# Patient Record
Sex: MALE | Race: WHITE | Employment: FULL TIME | URBAN - METROPOLITAN AREA
[De-identification: names, ages, dates, MRNs, and addresses within clinical notes are randomized per-mention and may not be internally consistent; named-entity substitution may affect disease eponyms.]

---

## 2017-07-04 ENCOUNTER — HOSPITAL ENCOUNTER (EMERGENCY)
Age: 27
Discharge: HOME | End: 2017-07-04
Attending: EMERGENCY MEDICINE
Payer: MEDICARE

## 2017-07-04 ENCOUNTER — APPOINTMENT (OUTPATIENT)
Dept: CT IMAGING | Age: 27
End: 2017-07-04
Payer: MEDICARE

## 2017-07-04 VITALS
SYSTOLIC BLOOD PRESSURE: 125 MMHG | TEMPERATURE: 97.6 F | WEIGHT: 315 LBS | BODY MASS INDEX: 38.36 KG/M2 | RESPIRATION RATE: 18 BRPM | DIASTOLIC BLOOD PRESSURE: 70 MMHG | HEART RATE: 85 BPM | OXYGEN SATURATION: 98 % | HEIGHT: 76 IN

## 2017-07-04 DIAGNOSIS — S00.83XA CONTUSION OF FACE, INITIAL ENCOUNTER: Primary | ICD-10-CM

## 2017-07-04 PROCEDURE — 99284 EMERGENCY DEPT VISIT MOD MDM: CPT

## 2017-07-04 PROCEDURE — 70486 CT MAXILLOFACIAL W/O DYE: CPT

## 2017-07-04 ASSESSMENT — PAIN DESCRIPTION - PAIN TYPE: TYPE: ACUTE PAIN

## 2017-07-04 ASSESSMENT — VISUAL ACUITY
OU: 20/20
OS: 20/25
OD: 20/20

## 2017-07-04 ASSESSMENT — PAIN SCALES - GENERAL: PAINLEVEL_OUTOF10: 8

## 2017-07-04 ASSESSMENT — PAIN DESCRIPTION - ORIENTATION: ORIENTATION: LEFT;LOWER;UPPER

## 2017-07-04 ASSESSMENT — PAIN DESCRIPTION - LOCATION: LOCATION: EYE

## 2017-07-17 ENCOUNTER — HOSPITAL ENCOUNTER (EMERGENCY)
Age: 27
Discharge: HOME | End: 2017-07-17
Attending: EMERGENCY MEDICINE
Payer: MEDICARE

## 2017-07-17 VITALS
SYSTOLIC BLOOD PRESSURE: 153 MMHG | DIASTOLIC BLOOD PRESSURE: 87 MMHG | HEART RATE: 94 BPM | TEMPERATURE: 98.1 F | OXYGEN SATURATION: 100 % | RESPIRATION RATE: 18 BRPM

## 2017-07-17 DIAGNOSIS — B07.0 PLANTAR WART OF RIGHT FOOT: Primary | ICD-10-CM

## 2017-07-17 PROCEDURE — 99282 EMERGENCY DEPT VISIT SF MDM: CPT

## 2017-07-17 ASSESSMENT — ENCOUNTER SYMPTOMS
BACK PAIN: 0
ABDOMINAL PAIN: 0
DIARRHEA: 0
NAUSEA: 0
VOMITING: 0
COLOR CHANGE: 0
SHORTNESS OF BREATH: 0
COUGH: 0

## 2017-07-17 ASSESSMENT — PAIN DESCRIPTION - LOCATION: LOCATION: FOOT

## 2017-07-17 ASSESSMENT — PAIN DESCRIPTION - PROGRESSION: CLINICAL_PROGRESSION: GRADUALLY WORSENING

## 2017-07-17 ASSESSMENT — PAIN DESCRIPTION - DESCRIPTORS: DESCRIPTORS: ACHING

## 2017-07-17 ASSESSMENT — PAIN DESCRIPTION - ONSET: ONSET: ON-GOING

## 2017-07-17 ASSESSMENT — PAIN DESCRIPTION - ORIENTATION: ORIENTATION: LEFT

## 2017-07-17 ASSESSMENT — PAIN DESCRIPTION - PAIN TYPE: TYPE: ACUTE PAIN

## 2017-07-17 ASSESSMENT — PAIN SCALES - GENERAL: PAINLEVEL_OUTOF10: 10

## 2017-07-24 ENCOUNTER — HOSPITAL ENCOUNTER (EMERGENCY)
Age: 27
Discharge: HOME | End: 2017-07-24
Attending: EMERGENCY MEDICINE
Payer: MEDICARE

## 2017-07-24 ENCOUNTER — APPOINTMENT (OUTPATIENT)
Dept: CT IMAGING | Age: 27
End: 2017-07-24
Payer: MEDICARE

## 2017-07-24 VITALS
DIASTOLIC BLOOD PRESSURE: 65 MMHG | BODY MASS INDEX: 38.36 KG/M2 | WEIGHT: 315 LBS | RESPIRATION RATE: 18 BRPM | HEIGHT: 76 IN | OXYGEN SATURATION: 100 % | SYSTOLIC BLOOD PRESSURE: 155 MMHG | TEMPERATURE: 98.3 F | HEART RATE: 93 BPM

## 2017-07-24 DIAGNOSIS — R51.9 NONINTRACTABLE HEADACHE, UNSPECIFIED CHRONICITY PATTERN, UNSPECIFIED HEADACHE TYPE: Primary | ICD-10-CM

## 2017-07-24 PROCEDURE — 99284 EMERGENCY DEPT VISIT MOD MDM: CPT

## 2017-07-24 PROCEDURE — 96374 THER/PROPH/DIAG INJ IV PUSH: CPT

## 2017-07-24 PROCEDURE — 70450 CT HEAD/BRAIN W/O DYE: CPT

## 2017-07-24 PROCEDURE — 6360000002 HC RX W HCPCS: Performed by: PHYSICIAN ASSISTANT

## 2017-07-24 PROCEDURE — 2580000003 HC RX 258: Performed by: PHYSICIAN ASSISTANT

## 2017-07-24 PROCEDURE — 96376 TX/PRO/DX INJ SAME DRUG ADON: CPT

## 2017-07-24 PROCEDURE — 96375 TX/PRO/DX INJ NEW DRUG ADDON: CPT

## 2017-07-24 RX ORDER — DIPHENHYDRAMINE HYDROCHLORIDE 50 MG/ML
25 INJECTION INTRAMUSCULAR; INTRAVENOUS ONCE
Status: COMPLETED | OUTPATIENT
Start: 2017-07-24 | End: 2017-07-24

## 2017-07-24 RX ORDER — HYDROCODONE BITARTRATE AND ACETAMINOPHEN 5; 325 MG/1; MG/1
1 TABLET ORAL 3 TIMES DAILY
Qty: 12 TABLET | Refills: 0 | Status: SHIPPED | OUTPATIENT
Start: 2017-07-24 | End: 2017-07-28

## 2017-07-24 RX ORDER — ONDANSETRON 4 MG/1
4 TABLET, ORALLY DISINTEGRATING ORAL EVERY 8 HOURS PRN
Qty: 20 TABLET | Refills: 0 | Status: SHIPPED | OUTPATIENT
Start: 2017-07-24 | End: 2020-12-28

## 2017-07-24 RX ORDER — ONDANSETRON 2 MG/ML
4 INJECTION INTRAMUSCULAR; INTRAVENOUS ONCE
Status: COMPLETED | OUTPATIENT
Start: 2017-07-24 | End: 2017-07-24

## 2017-07-24 RX ORDER — BUTALBITAL, ACETAMINOPHEN AND CAFFEINE 50; 325; 40 MG/1; MG/1; MG/1
1 TABLET ORAL EVERY 6 HOURS PRN
Qty: 20 TABLET | Refills: 0 | Status: SHIPPED | OUTPATIENT
Start: 2017-07-24 | End: 2020-12-28

## 2017-07-24 RX ORDER — 0.9 % SODIUM CHLORIDE 0.9 %
1000 INTRAVENOUS SOLUTION INTRAVENOUS ONCE
Status: COMPLETED | OUTPATIENT
Start: 2017-07-24 | End: 2017-07-24

## 2017-07-24 RX ORDER — PROMETHAZINE HYDROCHLORIDE 25 MG/ML
12.5 INJECTION, SOLUTION INTRAMUSCULAR; INTRAVENOUS ONCE
Status: COMPLETED | OUTPATIENT
Start: 2017-07-24 | End: 2017-07-24

## 2017-07-24 RX ADMIN — PROMETHAZINE HYDROCHLORIDE 12.5 MG: 25 INJECTION INTRAMUSCULAR; INTRAVENOUS at 20:44

## 2017-07-24 RX ADMIN — HYDROMORPHONE HYDROCHLORIDE 1 MG: 1 INJECTION, SOLUTION INTRAMUSCULAR; INTRAVENOUS; SUBCUTANEOUS at 20:45

## 2017-07-24 RX ADMIN — SODIUM CHLORIDE 1000 ML: 9 INJECTION, SOLUTION INTRAVENOUS at 20:44

## 2017-07-24 RX ADMIN — DIPHENHYDRAMINE HYDROCHLORIDE 25 MG: 50 INJECTION, SOLUTION INTRAMUSCULAR; INTRAVENOUS at 20:44

## 2017-07-24 RX ADMIN — ONDANSETRON 4 MG: 2 INJECTION INTRAMUSCULAR; INTRAVENOUS at 22:30

## 2017-07-24 RX ADMIN — HYDROMORPHONE HYDROCHLORIDE 1 MG: 1 INJECTION, SOLUTION INTRAMUSCULAR; INTRAVENOUS; SUBCUTANEOUS at 21:53

## 2017-07-24 ASSESSMENT — PAIN SCALES - GENERAL
PAINLEVEL_OUTOF10: 10
PAINLEVEL_OUTOF10: 9
PAINLEVEL_OUTOF10: 5
PAINLEVEL_OUTOF10: 10
PAINLEVEL_OUTOF10: 8

## 2017-07-24 ASSESSMENT — PAIN DESCRIPTION - LOCATION
LOCATION: HEAD
LOCATION: HEAD

## 2017-07-24 ASSESSMENT — PAIN DESCRIPTION - PAIN TYPE
TYPE: ACUTE PAIN

## 2017-08-16 ENCOUNTER — HOSPITAL ENCOUNTER (EMERGENCY)
Age: 27
Discharge: HOME | End: 2017-08-16
Attending: EMERGENCY MEDICINE
Payer: MEDICARE

## 2017-08-16 VITALS
DIASTOLIC BLOOD PRESSURE: 79 MMHG | HEART RATE: 87 BPM | HEIGHT: 76 IN | RESPIRATION RATE: 15 BRPM | SYSTOLIC BLOOD PRESSURE: 146 MMHG | BODY MASS INDEX: 38.36 KG/M2 | OXYGEN SATURATION: 100 % | TEMPERATURE: 98.5 F | WEIGHT: 315 LBS

## 2017-08-16 DIAGNOSIS — T78.40XA ALLERGIC REACTION CAUSED BY A DRUG, INITIAL ENCOUNTER: ICD-10-CM

## 2017-08-16 DIAGNOSIS — J40 BRONCHITIS: Primary | ICD-10-CM

## 2017-08-16 PROCEDURE — 99283 EMERGENCY DEPT VISIT LOW MDM: CPT

## 2017-08-16 RX ORDER — METHYLPREDNISOLONE 4 MG/1
TABLET ORAL
Qty: 1 KIT | Refills: 0 | Status: SHIPPED | OUTPATIENT
Start: 2017-08-16 | End: 2017-08-22

## 2017-08-16 RX ORDER — AZITHROMYCIN 250 MG/1
TABLET, FILM COATED ORAL
Qty: 1 PACKET | Refills: 0 | Status: SHIPPED | OUTPATIENT
Start: 2017-08-16 | End: 2017-08-23 | Stop reason: ALTCHOICE

## 2017-08-16 RX ORDER — HYDROCODONE BITARTRATE AND ACETAMINOPHEN 5; 325 MG/1; MG/1
1 TABLET ORAL EVERY 6 HOURS PRN
Qty: 10 TABLET | Refills: 0 | Status: SHIPPED | OUTPATIENT
Start: 2017-08-16 | End: 2017-08-23

## 2017-08-16 ASSESSMENT — PAIN SCALES - GENERAL: PAINLEVEL_OUTOF10: 8

## 2017-08-16 ASSESSMENT — PAIN DESCRIPTION - FREQUENCY: FREQUENCY: CONTINUOUS

## 2017-08-16 ASSESSMENT — PAIN DESCRIPTION - DESCRIPTORS: DESCRIPTORS: BURNING;CONSTANT

## 2017-08-16 ASSESSMENT — PAIN DESCRIPTION - LOCATION: LOCATION: THROAT;CHEST

## 2017-08-23 ENCOUNTER — HOSPITAL ENCOUNTER (EMERGENCY)
Age: 27
Discharge: HOME | End: 2017-08-23
Attending: EMERGENCY MEDICINE
Payer: MEDICARE

## 2017-08-23 VITALS
DIASTOLIC BLOOD PRESSURE: 60 MMHG | HEART RATE: 81 BPM | SYSTOLIC BLOOD PRESSURE: 138 MMHG | TEMPERATURE: 97.9 F | BODY MASS INDEX: 38.36 KG/M2 | RESPIRATION RATE: 16 BRPM | OXYGEN SATURATION: 97 % | HEIGHT: 76 IN | WEIGHT: 315 LBS

## 2017-08-23 DIAGNOSIS — J06.9 VIRAL URI: Primary | ICD-10-CM

## 2017-08-23 PROCEDURE — 99283 EMERGENCY DEPT VISIT LOW MDM: CPT

## 2017-08-23 RX ORDER — FLUTICASONE PROPIONATE 50 MCG
1 SPRAY, SUSPENSION (ML) NASAL DAILY
Qty: 1 BOTTLE | Refills: 0 | Status: SHIPPED | OUTPATIENT
Start: 2017-08-23 | End: 2018-02-09

## 2017-08-23 ASSESSMENT — ENCOUNTER SYMPTOMS
VOICE CHANGE: 0
PHOTOPHOBIA: 0
COUGH: 0
TROUBLE SWALLOWING: 0
EYE PAIN: 0
RHINORRHEA: 1
SHORTNESS OF BREATH: 0
SORE THROAT: 0
SINUS PRESSURE: 1

## 2017-08-23 ASSESSMENT — PAIN SCALES - GENERAL: PAINLEVEL_OUTOF10: 8

## 2017-08-23 ASSESSMENT — PAIN DESCRIPTION - PAIN TYPE: TYPE: ACUTE PAIN

## 2017-08-23 ASSESSMENT — PAIN DESCRIPTION - LOCATION: LOCATION: HEAD

## 2017-08-23 ASSESSMENT — PAIN DESCRIPTION - DESCRIPTORS: DESCRIPTORS: PRESSURE

## 2017-10-17 ENCOUNTER — HOSPITAL ENCOUNTER (EMERGENCY)
Age: 27
Discharge: HOME | End: 2017-10-17
Attending: EMERGENCY MEDICINE
Payer: MEDICARE

## 2017-10-17 VITALS
OXYGEN SATURATION: 100 % | TEMPERATURE: 97.4 F | SYSTOLIC BLOOD PRESSURE: 153 MMHG | BODY MASS INDEX: 43.16 KG/M2 | DIASTOLIC BLOOD PRESSURE: 105 MMHG | WEIGHT: 315 LBS | RESPIRATION RATE: 19 BRPM | HEART RATE: 114 BPM

## 2017-10-17 DIAGNOSIS — J02.9 ACUTE PHARYNGITIS, UNSPECIFIED ETIOLOGY: Primary | ICD-10-CM

## 2017-10-17 DIAGNOSIS — J40 BRONCHITIS: ICD-10-CM

## 2017-10-17 PROCEDURE — 6370000000 HC RX 637 (ALT 250 FOR IP): Performed by: EMERGENCY MEDICINE

## 2017-10-17 PROCEDURE — 96372 THER/PROPH/DIAG INJ SC/IM: CPT

## 2017-10-17 PROCEDURE — 99282 EMERGENCY DEPT VISIT SF MDM: CPT

## 2017-10-17 PROCEDURE — 6360000002 HC RX W HCPCS: Performed by: EMERGENCY MEDICINE

## 2017-10-17 RX ORDER — AZITHROMYCIN 250 MG/1
500 TABLET, FILM COATED ORAL ONCE
Status: COMPLETED | OUTPATIENT
Start: 2017-10-17 | End: 2017-10-17

## 2017-10-17 RX ORDER — DEXAMETHASONE SODIUM PHOSPHATE 10 MG/ML
10 INJECTION INTRAMUSCULAR; INTRAVENOUS ONCE
Status: COMPLETED | OUTPATIENT
Start: 2017-10-17 | End: 2017-10-17

## 2017-10-17 RX ORDER — AZITHROMYCIN 500 MG/1
500 TABLET, FILM COATED ORAL DAILY
Qty: 5 TABLET | Refills: 0 | Status: SHIPPED | OUTPATIENT
Start: 2017-10-17 | End: 2017-10-22

## 2017-10-17 RX ORDER — HYDROCODONE BITARTRATE AND ACETAMINOPHEN 5; 325 MG/1; MG/1
1-2 TABLET ORAL EVERY 6 HOURS PRN
Qty: 20 TABLET | Refills: 0 | Status: SHIPPED | OUTPATIENT
Start: 2017-10-17 | End: 2020-12-28

## 2017-10-17 RX ORDER — GUAIFENESIN AND DEXTROMETHORPHAN HYDROBROMIDE 1200; 60 MG/1; MG/1
1 TABLET, EXTENDED RELEASE ORAL 2 TIMES DAILY
Qty: 28 TABLET | Refills: 0 | Status: SHIPPED | OUTPATIENT
Start: 2017-10-17 | End: 2018-02-09

## 2017-10-17 RX ADMIN — AZITHROMYCIN 500 MG: 250 TABLET, FILM COATED ORAL at 03:13

## 2017-10-17 RX ADMIN — DEXAMETHASONE SODIUM PHOSPHATE 10 MG: 10 INJECTION INTRAMUSCULAR; INTRAVENOUS at 03:13

## 2017-10-17 ASSESSMENT — ENCOUNTER SYMPTOMS
GASTROINTESTINAL NEGATIVE: 1
SORE THROAT: 1
COUGH: 1

## 2017-10-17 ASSESSMENT — PAIN SCALES - GENERAL: PAINLEVEL_OUTOF10: 8

## 2017-10-17 ASSESSMENT — PAIN DESCRIPTION - PAIN TYPE: TYPE: ACUTE PAIN

## 2017-10-17 NOTE — ED PROVIDER NOTES
today. Amy Cantor MD)  Documentation: Possible medication side effects, risk of tolerance and/or dependence, and alternative treatments discussed.  Amy Cantor MD)  (Please note that portions of this note were completed with a voice recognition program.  Efforts were made to edit the dictations but occasionally words are mis-transcribed.)    Amy Cantor MD  Attending Emergency Physician         Amy Cantor MD  10/17/17 2555

## 2017-10-27 ENCOUNTER — HOSPITAL ENCOUNTER (EMERGENCY)
Age: 27
Discharge: HOME | End: 2017-10-27
Attending: EMERGENCY MEDICINE
Payer: MEDICARE

## 2017-10-27 VITALS
HEIGHT: 76 IN | TEMPERATURE: 97.9 F | WEIGHT: 315 LBS | HEART RATE: 97 BPM | BODY MASS INDEX: 38.36 KG/M2 | RESPIRATION RATE: 18 BRPM | OXYGEN SATURATION: 96 % | DIASTOLIC BLOOD PRESSURE: 86 MMHG | SYSTOLIC BLOOD PRESSURE: 142 MMHG

## 2017-10-27 DIAGNOSIS — J02.9 VIRAL PHARYNGITIS: Primary | ICD-10-CM

## 2017-10-27 PROCEDURE — 6360000002 HC RX W HCPCS: Performed by: EMERGENCY MEDICINE

## 2017-10-27 PROCEDURE — 6370000000 HC RX 637 (ALT 250 FOR IP): Performed by: EMERGENCY MEDICINE

## 2017-10-27 PROCEDURE — 99283 EMERGENCY DEPT VISIT LOW MDM: CPT

## 2017-10-27 RX ORDER — PSEUDOEPHEDRINE HYDROCHLORIDE 30 MG/1
60 TABLET ORAL EVERY 4 HOURS PRN
Status: DISCONTINUED | OUTPATIENT
Start: 2017-10-27 | End: 2017-10-27 | Stop reason: HOSPADM

## 2017-10-27 RX ORDER — DEXAMETHASONE 4 MG/1
10 TABLET ORAL ONCE
Status: COMPLETED | OUTPATIENT
Start: 2017-10-27 | End: 2017-10-27

## 2017-10-27 RX ADMIN — PSEUDOEPHEDRINE HCL 60 MG: 30 TABLET, COATED ORAL at 06:24

## 2017-10-27 RX ADMIN — DEXAMETHASONE 10 MG: 4 TABLET ORAL at 06:24

## 2017-10-27 ASSESSMENT — PAIN SCALES - GENERAL: PAINLEVEL_OUTOF10: 8

## 2017-10-28 NOTE — ED PROVIDER NOTES
[address] ED  eMERGENCY dEPARTMENT eNCOUnter    Pt Name: Jerilyn Gastelum  MRN: [id number]  [place] 1990  Date of evaluation: 10/28/17  CHIEF COMPLAINT       Chief Complaint   Patient presents with    Otalgia    Pharyngitis    Neck Pain     HISTORY OF PRESENT ILLNESS   HPI   56-year-old male past medical history as listed below presents the ER with a sore throat. Patient states symptoms ongoing for approximately 2 weeks despite treatment with antibiotic have persisted. Patient states the pain radiates to bilateral ears. Patient denies fevers, chills, nausea, vomiting. Patient states he has intermittent neck discomfort. It is made worse with movement. REVIEW OF SYSTEMS     Review of Systems   All other systems reviewed and are negative.     PAST MEDICAL HISTORY     Past Medical History:   Diagnosis Date    Anxiety     Asthma     Bipolar disorder ([address]     Depression     GERD (gastroesophageal reflux disease)     Headache     Hypertension     Hypothyroidism     Insomnia     Unspecified sleep apnea      SURGICAL HISTORY       Past Surgical History:   Procedure Laterality Date    FINGER TRIGGER RELEASE      UPPER GASTROINTESTINAL ENDOSCOPY      WISDOM TOOTH EXTRACTION       CURRENT MEDICATIONS       Discharge Medication List as of 10/27/2017  6:05 AM      CONTINUE these medications which have NOT CHANGED    Details   omeprazole (PRILOSEC) 20 MG capsule Take 40 mg by mouth daily Historical Med      carvedilol (COREG) 6.25 MG tablet Take 6.25 mg by mouth 2 times daily (with meals)      fexofenadine (ALLEGRA ALLERGY) 180 MG tablet Take 180 mg by mouth daily      ALPRAZolam (XANAX) 0.5 MG tablet Take 1 mg by mouth nightly as needed for Sleep       amLODIPine (NORVASC) 5 MG tablet   Take 10 mg by mouth daily       Multiple Vitamins-Minerals (CENTROVITE) TABS Historical Med      FLUoxetine (PROZAC) 40 MG capsule Total of 60 mg daily, Disp-30 capsule, R-11      VENTOLIN  (90 BASE) MCG/ACT inhaler Inhale 2 puffs into the lungs every 4 hours as needed for Wheezing or Shortness of Breath (none today). levothyroxine (SYNTHROID) 50 MCG tablet   75 mcg daily       Dextromethorphan-Guaifenesin (MUCINEX DM MAXIMUM STRENGTH)  MG TB12 Take 1 tablet by mouth 2 times daily, Disp-28 tablet, R-0Print      HYDROcodone-acetaminophen (NORCO) 5-325 MG per tablet Take 1-2 tablets by mouth every 6 hours as needed (as needed for cough, discomfort, fever) . , Disp-20 tablet, R-0Print      fluticasone (FLONASE) 50 MCG/ACT nasal spray 1 spray by Nasal route daily, Disp-1 Bottle, R-0Print      butalbital-acetaminophen-caffeine (FIORICET) -40 MG per tablet Take 1 tablet by mouth every 6 hours as needed for Headaches, Disp-20 tablet, R-0Print      ondansetron (ZOFRAN ODT) 4 MG disintegrating tablet Take 1 tablet by mouth every 8 hours as needed for Nausea, Disp-20 tablet, R-0Print           ALLERGIES     is allergic to chantix [varenicline tartrate]; ciprofloxacin; nsaids; pcn [penicillins]; and tessalon [benzonatate]. FAMILY HISTORY     is adopted. SOCIAL HISTORY      reports that he has been smoking Cigarettes. He has a 5.00 pack-year smoking history. He has never used smokeless tobacco. He reports that he does not drink alcohol or use drugs. PHYSICAL EXAM     INITIAL VITALS: BP (!) 142/86   Pulse 97   Temp 97.9 °F (36.6 °C) (Oral)   Resp 18   Ht 6' 4\" (1.93 m)   Wt (!) 340 lb (154.2 kg)   SpO2 96%   BMI 41.39 kg/m²    Physical Exam   Constitutional: He is oriented to person, place, and time. He appears well-developed and well-nourished. No distress. HENT:   Head: Normocephalic and atraumatic. Right Ear: External ear normal.   Left Ear: External ear normal.   Nose: Nose normal.   Mouth/Throat: Oropharynx is clear and moist. No oropharyngeal exudate. Scant oropharyngeal erythema. Eyes: Conjunctivae and EOM are normal. Pupils are equal, round, and reactive to light.    Neck: Normal range of motion. Neck supple. Cardiovascular: Normal rate, regular rhythm, normal heart sounds and intact distal pulses. Exam reveals no gallop and no friction rub. No murmur heard. Pulmonary/Chest: Effort normal and breath sounds normal. No stridor. No respiratory distress. He has no wheezes. He has no rales. He exhibits no tenderness. Abdominal: Soft. Bowel sounds are normal. He exhibits no distension. There is no tenderness. There is no rebound and no guarding. Musculoskeletal: Normal range of motion. He exhibits no edema, tenderness or deformity. Neurological: He is alert and oriented to person, place, and time. No cranial nerve deficit. Skin: Skin is warm and dry. No rash noted. He is not diaphoretic. No erythema. Psychiatric: He has a normal mood and affect. His behavior is normal. Judgment and thought content normal.   Nursing note and vitals reviewed. MEDICAL DECISION MAKING:   MDM    Provide patient steroid and arrange close follow-up with primary care physician. Procedures    DIAGNOSTIC RESULTS   EKG: All EKG's are interpreted by the Emergency Department Physician who either signs or Co-signs this chart in the absence of a cardiologist.      RADIOLOGY:All plain film, CT, MRI, and formal ultrasound images (except ED bedside ultrasound) are read by the radiologist and interpretations are viewed by the emergency physician. No orders to display     LABS: All lab results were reviewed by myself, and all abnormals are listed below.   Labs Reviewed - No data to display  EMERGENCY DEPARTMENT COURSE:     Vitals:    Vitals:    10/27/17 0530   BP: (!) 142/86   Pulse: 97   Resp: 18   Temp: 97.9 °F (36.6 °C)   TempSrc: Oral   SpO2: 96%   Weight: (!) 340 lb (154.2 kg)   Height: 6' 4\" (1.93 m)     The patient was given the following medications while in the emergency department:  Orders Placed This Encounter   Medications    dexamethasone (DECADRON) tablet 10 mg    DISCONTD: pseudoephedrine (SUDAFED) tablet 60 mg     CONSULTS:  None    FINAL IMPRESSION      1. Viral pharyngitis          DISPOSITION/PLAN   DISPOSITION Decision to Discharge    PATIENT REFERRED TO:  No follow-up provider specified.   DISCHARGE MEDICATIONS:  Discharge Medication List as of 10/27/2017  6:05 AM        Ji Aguilera MD  Attending Emergency Physician                     Ji Aguilera MD  10/28/17 1971

## 2017-12-13 ENCOUNTER — APPOINTMENT (OUTPATIENT)
Dept: GENERAL RADIOLOGY | Age: 27
End: 2017-12-13
Payer: MEDICARE

## 2017-12-13 ENCOUNTER — HOSPITAL ENCOUNTER (EMERGENCY)
Age: 27
Discharge: HOME | End: 2017-12-13
Attending: EMERGENCY MEDICINE
Payer: MEDICARE

## 2017-12-13 VITALS
BODY MASS INDEX: 38.36 KG/M2 | WEIGHT: 315 LBS | OXYGEN SATURATION: 98 % | DIASTOLIC BLOOD PRESSURE: 80 MMHG | TEMPERATURE: 97.4 F | HEART RATE: 82 BPM | RESPIRATION RATE: 16 BRPM | SYSTOLIC BLOOD PRESSURE: 157 MMHG | HEIGHT: 76 IN

## 2017-12-13 DIAGNOSIS — R05.9 COUGH: ICD-10-CM

## 2017-12-13 DIAGNOSIS — J02.9 SORE THROAT: Primary | ICD-10-CM

## 2017-12-13 LAB
DIRECT EXAM: NORMAL
Lab: NORMAL
SPECIMEN DESCRIPTION: NORMAL
SPECIMEN DESCRIPTION: NORMAL
STATUS: NORMAL

## 2017-12-13 PROCEDURE — 99283 EMERGENCY DEPT VISIT LOW MDM: CPT

## 2017-12-13 PROCEDURE — 71020 XR CHEST STANDARD TWO VW: CPT

## 2017-12-13 PROCEDURE — 87880 STREP A ASSAY W/OPTIC: CPT

## 2017-12-13 PROCEDURE — 6370000000 HC RX 637 (ALT 250 FOR IP): Performed by: EMERGENCY MEDICINE

## 2017-12-13 RX ORDER — GUAIFENESIN AND CODEINE PHOSPHATE 100; 10 MG/5ML; MG/5ML
5 SOLUTION ORAL 3 TIMES DAILY PRN
Qty: 100 ML | Refills: 0 | Status: SHIPPED | OUTPATIENT
Start: 2017-12-13 | End: 2017-12-20

## 2017-12-13 RX ADMIN — LIDOCAINE HYDROCHLORIDE 15 ML: 20 SOLUTION ORAL; TOPICAL at 06:50

## 2017-12-13 ASSESSMENT — ENCOUNTER SYMPTOMS
BLOOD IN STOOL: 0
EYE REDNESS: 0
SINUS PRESSURE: 0
SHORTNESS OF BREATH: 0
DIARRHEA: 0
NAUSEA: 0
EYE PAIN: 0
COUGH: 1
COLOR CHANGE: 0
EYE DISCHARGE: 0
TROUBLE SWALLOWING: 0
CONSTIPATION: 0
WHEEZING: 0
CHEST TIGHTNESS: 0
RHINORRHEA: 0
FACIAL SWELLING: 0
ABDOMINAL PAIN: 0
SORE THROAT: 1
BACK PAIN: 0
VOMITING: 0

## 2017-12-13 ASSESSMENT — PAIN DESCRIPTION - PAIN TYPE: TYPE: ACUTE PAIN

## 2017-12-13 ASSESSMENT — PAIN SCALES - GENERAL: PAINLEVEL_OUTOF10: 10

## 2017-12-13 ASSESSMENT — PAIN DESCRIPTION - FREQUENCY: FREQUENCY: CONTINUOUS

## 2017-12-13 ASSESSMENT — PAIN DESCRIPTION - DESCRIPTORS: DESCRIPTORS: BURNING;CONSTANT;SHARP

## 2017-12-13 NOTE — ED NOTES
Pt states that he has been feeling sick for about 1 week. Pt states that his lungs and chest hurt from coughing and none of the OTC medications that he tried have not helped. Pt states that he went to his Doctor and received a Z-pack antibiotic but this did not help his symptoms. Pt A&Ox4, eupneic, GCS at 15, PWD, heart sounds strong and regular, lungs clear to auscultation, denies nausea.      Ian Soto RN  12/13/17 [id number]

## 2017-12-13 NOTE — ED PROVIDER NOTES
ONDANSETRON (ZOFRAN ODT) 4 MG DISINTEGRATING TABLET    Take 1 tablet by mouth every 8 hours as needed for Nausea    VENTOLIN  (90 BASE) MCG/ACT INHALER    Inhale 2 puffs into the lungs every 4 hours as needed for Wheezing or Shortness of Breath (none today). ALLERGIES     is allergic to chantix [varenicline tartrate]; ciprofloxacin; nsaids; pcn [penicillins]; and tessalon [benzonatate]. SOCIAL HISTORY      reports that he has been smoking Cigarettes. He has a 2.50 pack-year smoking history. He has never used smokeless tobacco. He reports that he does not drink alcohol or use drugs. PHYSICAL EXAM     INITIAL VITALS: BP (!) 157/80   Pulse 82   Temp 97.4 °F (36.3 °C) (Oral)   Resp 16   Ht 6' 4\" (1.93 m)   Wt (!) 349 lb (158.3 kg)   SpO2 98%   BMI 42.48 kg/m²      Physical Exam   Constitutional: He is oriented to person, place, and time. He appears well-developed and well-nourished. No distress. HENT:   Head: Normocephalic and atraumatic. Mouth/Throat: Oropharynx is clear and moist. No oropharyngeal exudate. Eyes: Conjunctivae and EOM are normal. Pupils are equal, round, and reactive to light. Right eye exhibits no discharge. Left eye exhibits no discharge. No scleral icterus. Neck: Normal range of motion. Neck supple. Cardiovascular: Normal rate, regular rhythm and normal heart sounds. Exam reveals no gallop and no friction rub. No murmur heard. Pulmonary/Chest: Effort normal and breath sounds normal. No respiratory distress. He has no wheezes. He has no rales. He exhibits tenderness. Abdominal: Soft. Bowel sounds are normal. He exhibits no distension and no mass. There is no tenderness. There is no rebound and no guarding. Musculoskeletal: Normal range of motion. He exhibits no edema or tenderness. Lymphadenopathy:     He has no cervical adenopathy. Neurological: He is alert and oriented to person, place, and time. He displays normal reflexes. No cranial nerve deficit. He exhibits normal muscle tone. Coordination normal.   Skin: Skin is warm and dry. No rash noted. He is not diaphoretic. No erythema. No pallor. Psychiatric: He has a normal mood and affect. His behavior is normal. Judgment and thought content normal.   Nursing note and vitals reviewed. DIAGNOSTIC RESULTS     RADIOLOGY:All plain film, CT, MRI, and formal ultrasound images (except ED bedside ultrasound) are read by the radiologist and the images and interpretations are directly viewed by the emergency physician. Xr Chest Standard (2 Vw)    Result Date: 12/13/2017  EXAMINATION: TWO VIEWS OF THE CHEST 12/13/2017 6:44 am COMPARISON: Chest x-ray from 11/02/2015 HISTORY: ORDERING SYSTEM PROVIDED HISTORY: Chest Pain, cough TECHNOLOGIST PROVIDED HISTORY: Reason for exam:->Chest Pain, cough Ordering Physician Provided Reason for Exam: COUGH Acuity: Acute Type of Exam: Initial FINDINGS: There is no focal airspace consolidation, pleural effusion or pneumothorax. The cardiomediastinal silhouette appears within normal limits and there is no pulmonary vascular congestion. Visualized osseous structures appear intact, and grossly unremarkable, given the non dedicated imaging. No radiographic evidence for acute cardiopulmonary disease process. LABS: All lab results were reviewed by myself, and all abnormals are listed below. Labs Reviewed   STREP SCREEN GROUP A THROAT         MEDICAL DECISION MAKING:     Patient's symptoms appear to be viral in nature. Patient is Moisés been on a Z-Jere and had no relief which further confirms my belief. We'll get an x-ray because of all the coughing just to make sure he does not have pneumonia in which case he would need different antibiotics. Also swab was throat even though I don't really see much other than some redness but if that's positive we'll also treat him for that.   Patient's will be prescribed a different cough medicine to try to help with his cough.      EMERGENCY DEPARTMENT COURSE:   Vitals:    Vitals:    12/13/17 0635 12/13/17 0640   BP: (!) 157/80    Pulse: 93 82   Resp: 16    Temp: 97.4 °F (36.3 °C)    TempSrc: Oral    SpO2: 98%    Weight: (!) 349 lb (158.3 kg)    Height: 6' 4\" (1.93 m)        The patient was given the following medications while in the emergency department:  Orders Placed This Encounter   Medications    lidocaine viscous (XYLOCAINE) 2 % solution 15 mL    guaiFENesin-codeine (TUSSI-ORGANIDIN NR) 100-10 MG/5ML syrup     Sig: Take 5 mLs by mouth 3 times daily as needed for Cough . Dispense:  100 mL     Refill:  0       -------------------------  7:07 AM  Patient was updated on results plan of care. CONSULTS:  None    PROCEDURES:  none    FINAL IMPRESSION      1. Sore throat    2. Cough          DISPOSITION/PLAN   DISPOSITION Decision to Discharge    PATIENT REFERRED TO:  [address] [address]  [address] [facility]  [phone]    If symptoms worsen    PCP    In 1 week        DISCHARGE MEDICATIONS:  New Prescriptions    GUAIFENESIN-CODEINE (TUSSI-ORGANIDIN NR) 100-10 MG/5ML SYRUP    Take 5 mLs by mouth 3 times daily as needed for Cough .        (Please note that portions of this note were completed with a voice recognition program.  Efforts were made to edit the dictations but occasionally words are mis-transcribed.)    Fauzia Valdivia MD  Attending Emergency Physician                        Fauzia Valdivia MD  12/13/17 3748

## 2018-02-09 ENCOUNTER — APPOINTMENT (OUTPATIENT)
Dept: GENERAL RADIOLOGY | Age: 28
End: 2018-02-09
Payer: MEDICARE

## 2018-02-09 ENCOUNTER — HOSPITAL ENCOUNTER (EMERGENCY)
Age: 28
Discharge: HOME | End: 2018-02-09
Attending: EMERGENCY MEDICINE
Payer: MEDICARE

## 2018-02-09 VITALS
RESPIRATION RATE: 18 BRPM | TEMPERATURE: 97.7 F | HEIGHT: 76 IN | SYSTOLIC BLOOD PRESSURE: 147 MMHG | OXYGEN SATURATION: 98 % | DIASTOLIC BLOOD PRESSURE: 61 MMHG | BODY MASS INDEX: 38.36 KG/M2 | HEART RATE: 110 BPM | WEIGHT: 315 LBS

## 2018-02-09 DIAGNOSIS — J06.9 VIRAL URI: Primary | ICD-10-CM

## 2018-02-09 LAB
DIRECT EXAM: NORMAL
Lab: NORMAL
Lab: NORMAL
SPECIMEN DESCRIPTION: NORMAL
SPECIMEN DESCRIPTION: NORMAL
STATUS: NORMAL
STATUS: NORMAL

## 2018-02-09 PROCEDURE — 99285 EMERGENCY DEPT VISIT HI MDM: CPT

## 2018-02-09 PROCEDURE — 71046 X-RAY EXAM CHEST 2 VIEWS: CPT

## 2018-02-09 PROCEDURE — 87804 INFLUENZA ASSAY W/OPTIC: CPT

## 2018-02-09 PROCEDURE — 87651 STREP A DNA AMP PROBE: CPT

## 2018-02-09 RX ORDER — GUAIFENESIN AND DEXTROMETHORPHAN HYDROBROMIDE 600; 30 MG/1; MG/1
1 TABLET, EXTENDED RELEASE ORAL 2 TIMES DAILY
Qty: 28 TABLET | Refills: 0 | Status: SHIPPED | OUTPATIENT
Start: 2018-02-09

## 2018-02-09 RX ORDER — FLUTICASONE PROPIONATE 50 MCG
1 SPRAY, SUSPENSION (ML) NASAL DAILY
Qty: 1 BOTTLE | Refills: 0 | Status: SHIPPED | OUTPATIENT
Start: 2018-02-09 | End: 2020-12-28

## 2018-02-09 ASSESSMENT — PAIN SCALES - GENERAL: PAINLEVEL_OUTOF10: 10

## 2018-02-09 ASSESSMENT — PAIN DESCRIPTION - FREQUENCY: FREQUENCY: INTERMITTENT

## 2018-02-09 ASSESSMENT — PAIN DESCRIPTION - LOCATION: LOCATION: CHEST;GENERALIZED

## 2018-02-09 ASSESSMENT — PAIN DESCRIPTION - DESCRIPTORS: DESCRIPTORS: ACHING;SHARP

## 2018-02-09 ASSESSMENT — PAIN SCALES - WONG BAKER: WONGBAKER_NUMERICALRESPONSE: 10

## 2018-02-10 LAB
DIRECT EXAM: NORMAL
DIRECT EXAM: NORMAL
Lab: NORMAL
Lab: NORMAL
SPECIMEN DESCRIPTION: NORMAL
SPECIMEN DESCRIPTION: NORMAL
STATUS: NORMAL

## 2018-02-10 ASSESSMENT — ENCOUNTER SYMPTOMS
COLOR CHANGE: 0
ABDOMINAL PAIN: 0
SHORTNESS OF BREATH: 0
TROUBLE SWALLOWING: 0
BACK PAIN: 0
VOICE CHANGE: 0
DIARRHEA: 1
RHINORRHEA: 1
SORE THROAT: 1
VOMITING: 0
NAUSEA: 0
COUGH: 1
SINUS PRESSURE: 1

## 2020-10-24 ENCOUNTER — HOSPITAL ENCOUNTER (EMERGENCY)
Age: 30
Discharge: HOME | End: 2020-10-24
Attending: STUDENT IN AN ORGANIZED HEALTH CARE EDUCATION/TRAINING PROGRAM
Payer: MEDICARE

## 2020-10-24 ENCOUNTER — APPOINTMENT (OUTPATIENT)
Dept: CT IMAGING | Age: 30
End: 2020-10-24
Payer: MEDICARE

## 2020-10-24 VITALS
RESPIRATION RATE: 17 BRPM | HEART RATE: 90 BPM | HEIGHT: 76 IN | OXYGEN SATURATION: 97 % | BODY MASS INDEX: 37.38 KG/M2 | WEIGHT: 307 LBS

## 2020-10-24 PROCEDURE — 6370000000 HC RX 637 (ALT 250 FOR IP): Performed by: STUDENT IN AN ORGANIZED HEALTH CARE EDUCATION/TRAINING PROGRAM

## 2020-10-24 PROCEDURE — 72125 CT NECK SPINE W/O DYE: CPT

## 2020-10-24 PROCEDURE — 99285 EMERGENCY DEPT VISIT HI MDM: CPT

## 2020-10-24 RX ORDER — METHOCARBAMOL 750 MG/1
750 TABLET, FILM COATED ORAL 3 TIMES DAILY
Qty: 30 TABLET | Refills: 0 | Status: SHIPPED | OUTPATIENT
Start: 2020-10-24 | End: 2020-11-03

## 2020-10-24 RX ORDER — METHOCARBAMOL 500 MG/1
1000 TABLET, FILM COATED ORAL ONCE
Status: COMPLETED | OUTPATIENT
Start: 2020-10-24 | End: 2020-10-24

## 2020-10-24 RX ADMIN — METHOCARBAMOL 1000 MG: 500 TABLET ORAL at 07:20

## 2020-10-24 ASSESSMENT — ENCOUNTER SYMPTOMS
EYE REDNESS: 0
ABDOMINAL PAIN: 0
VOMITING: 0
BACK PAIN: 0
RHINORRHEA: 0
FACIAL SWELLING: 0
EYE PAIN: 0
SORE THROAT: 0
SHORTNESS OF BREATH: 0
DIARRHEA: 0
COLOR CHANGE: 0
NAUSEA: 0
COUGH: 0

## 2020-10-24 ASSESSMENT — PAIN SCALES - GENERAL: PAINLEVEL_OUTOF10: 7

## 2020-10-24 NOTE — ED NOTES
Mode of arrival (squad #, walk in, police, etc) : walk in        Chief complaint(s): c/o fall and neck pain        Arrival Note (brief scenario, treatment PTA, etc). : Pt presents to ED from work c/o fall. Pt stated he was sitting on a chair when the chair collapsed and he fell and hit his neck on the chair and his head on the concrete floor. Pt denies any loc. Pt is A&Ox4, eupneic, PWD. GCS=15. Call light in reach. C= \"Have you ever felt that you should Cut down on your drinking? \"  No  A= \"Have people Annoyed you by criticizing your drinking? \"  No  G= \"Have you ever felt bad or Guilty about your drinking? \"  No  E= \"Have you ever had a drink as an Eye-opener first thing in the morning to steady your nerves or to help a hangover? \"  No      Deferred []      Reason for deferring: N/A    *If yes to two or more: probable alcohol abuse. Dana Houston RN  10/24/20 7568

## 2020-10-24 NOTE — ED PROVIDER NOTES
EMERGENCY DEPARTMENT ENCOUNTER    Pt Name: Junie Jacob  MRN: [id number]  [place] 1990  Date of evaluation: 10/24/20  CHIEF COMPLAINT       Chief Complaint   Patient presents with    Fall    Neck Pain     HISTORY OF PRESENT ILLNESS   HPI  50-year-old male history of anxiety, bipolar, GERD, hypertension presents with chief complaint of neck pain after a fall. Patient states that 4 hours ago he was at work on a desk chair. August of this chair was loose. Initially he fell because it was loose and hit the back of his head on the ground with a hyperflexion type neck injury. He then got back on the chair which subsequently broke and he fell again with the same mechanism. He now is complaining of midline neck pain. He did not lose consciousness. No nausea or vomiting. No neurologic complaints no other injuries of the extremities. No chest pain or shortness of breath. No abdominal pain. No other recent illness. Did take some Tylenol immediately after the fall. Was able to get up and walk afterwards. REVIEW OF SYSTEMS     Review of Systems   Constitutional: Negative for chills and fatigue. HENT: Negative for facial swelling, nosebleeds, rhinorrhea and sore throat. Eyes: Negative for pain and redness. Respiratory: Negative for cough and shortness of breath. Cardiovascular: Negative for chest pain and leg swelling. Gastrointestinal: Negative for abdominal pain, diarrhea, nausea and vomiting. Genitourinary: Negative for flank pain and hematuria. Musculoskeletal: Positive for neck pain. Negative for arthralgias and back pain. Skin: Negative for color change and rash. Neurological: Negative for dizziness, tremors, facial asymmetry, speech difficulty, weakness and numbness.      PASTMEDICAL HISTORY     Past Medical History:   Diagnosis Date    Anxiety     Asthma     B[facility] disorder ([facility] [address]     Depression     GERD (gastroesophageal reflux disease)     Headache     Hypertension  Hypothyroidism     Insomnia     Unspecified sleep apnea      Past Problem List  Patient Active Problem List   Diagnosis Code    Hypertension I10    Asthma J45.909    Anxiety F41.9    Insomnia G47.00    Bipolar disorder ([facility] [address] F31.9    Hypothyroidism E03.9     SURGICAL HISTORY       Past Surgical History:   Procedure Laterality Date    FINGER TRIGGER RELEASE      UPPER GASTROINTESTINAL ENDOSCOPY      WISDOM TOOTH EXTRACTION       CURRENT MEDICATIONS       Previous Medications    ALPRAZOLAM (XANAX) 0.5 MG TABLET    Take 1 mg by mouth nightly as needed for Sleep     AMLODIPINE (NORVASC) 5 MG TABLET      Take 10 mg by mouth daily     BUTALBITAL-ACETAMINOPHEN-CAFFEINE (FIORICET) -40 MG PER TABLET    Take 1 tablet by mouth every 6 hours as needed for Headaches    CARVEDILOL (COREG) 6.25 MG TABLET    Take 6.25 mg by mouth 2 times daily (with meals)    DEXTROMETHORPHAN-GUAIFENESIN (MUCINEX DM)  MG TB12    Take 1 tablet by mouth 2 times daily    FEXOFENADINE (ALLEGRA ALLERGY) 180 MG TABLET    Take 180 mg by mouth daily    FLUOXETINE (PROZAC) 40 MG CAPSULE    Total of 60 mg daily    FLUTICASONE (FLONASE) 50 MCG/ACT NASAL SPRAY    1 spray by Nasal route daily    HYDROCODONE-ACETAMINOPHEN (NORCO) 5-325 MG PER TABLET    Take 1-2 tablets by mouth every 6 hours as needed (as needed for cough, discomfort, fever) . LEVOTHYROXINE (SYNTHROID) 50 MCG TABLET      75 mcg daily     MULTIPLE VITAMINS-MINERALS (CENTROVITE) TABS        ONDANSETRON (ZOFRAN ODT) 4 MG DISINTEGRATING TABLET    Take 1 tablet by mouth every 8 hours as needed for Nausea    PANTOPRAZOLE SODIUM (PROTONIX PO)    Take 40 mg by mouth daily    VENTOLIN  (90 BASE) MCG/ACT INHALER    Inhale 2 puffs into the lungs every 4 hours as needed for Wheezing or Shortness of Breath (none today). ALLERGIES     is allergic to chantix [varenicline tartrate]; ciprofloxacin; nsaids; pcn [penicillins]; and tessalon [benzonatate].   FAMILY HISTORY 2004

## 2020-11-01 ENCOUNTER — APPOINTMENT (OUTPATIENT)
Dept: GENERAL RADIOLOGY | Age: 30
End: 2020-11-01
Payer: MEDICARE

## 2020-11-01 ENCOUNTER — HOSPITAL ENCOUNTER (EMERGENCY)
Age: 30
Discharge: LEFT BEFORE DISCHARGE | End: 2020-11-01
Attending: EMERGENCY MEDICINE
Payer: MEDICARE

## 2020-11-01 VITALS
TEMPERATURE: 97.9 F | OXYGEN SATURATION: 98 % | BODY MASS INDEX: 37.38 KG/M2 | HEART RATE: 89 BPM | DIASTOLIC BLOOD PRESSURE: 72 MMHG | WEIGHT: 307 LBS | RESPIRATION RATE: 19 BRPM | HEIGHT: 76 IN | SYSTOLIC BLOOD PRESSURE: 139 MMHG

## 2020-11-01 PROCEDURE — 96372 THER/PROPH/DIAG INJ SC/IM: CPT

## 2020-11-01 PROCEDURE — 6360000002 HC RX W HCPCS: Performed by: EMERGENCY MEDICINE

## 2020-11-01 PROCEDURE — 72040 X-RAY EXAM NECK SPINE 2-3 VW: CPT

## 2020-11-01 PROCEDURE — 72072 X-RAY EXAM THORAC SPINE 3VWS: CPT

## 2020-11-01 PROCEDURE — 99283 EMERGENCY DEPT VISIT LOW MDM: CPT

## 2020-11-01 PROCEDURE — 72100 X-RAY EXAM L-S SPINE 2/3 VWS: CPT

## 2020-11-01 RX ORDER — KETOROLAC TROMETHAMINE 30 MG/ML
60 INJECTION, SOLUTION INTRAMUSCULAR; INTRAVENOUS ONCE
Status: COMPLETED | OUTPATIENT
Start: 2020-11-01 | End: 2020-11-01

## 2020-11-01 RX ADMIN — KETOROLAC TROMETHAMINE 60 MG: 30 INJECTION, SOLUTION INTRAMUSCULAR at 03:53

## 2020-11-01 ASSESSMENT — ENCOUNTER SYMPTOMS
SHORTNESS OF BREATH: 0
EYE DISCHARGE: 0
BACK PAIN: 1
NAUSEA: 0
COUGH: 0
COLOR CHANGE: 0
RHINORRHEA: 0
VOMITING: 0
EYE REDNESS: 0
SORE THROAT: 0
DIARRHEA: 0

## 2020-11-01 ASSESSMENT — PAIN SCALES - GENERAL
PAINLEVEL_OUTOF10: 8
PAINLEVEL_OUTOF10: 8

## 2020-11-01 NOTE — ED NOTES
Pt updated on POC. Pt states \"can another doctor sign my discharge paperwork, I don't want to wait around all day. \"   Pt states that he is leaving without his paperwork.       Marko Schwab RN  11/01/20 1984
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
## 2020-12-28 ENCOUNTER — APPOINTMENT (OUTPATIENT)
Dept: GENERAL RADIOLOGY | Facility: CLINIC | Age: 30
End: 2020-12-28
Payer: MEDICARE

## 2020-12-28 ENCOUNTER — HOSPITAL ENCOUNTER (EMERGENCY)
Facility: CLINIC | Age: 30
Discharge: HOME | End: 2020-12-28
Attending: SPECIALIST
Payer: MEDICARE

## 2020-12-28 VITALS
RESPIRATION RATE: 20 BRPM | HEIGHT: 76 IN | BODY MASS INDEX: 37.38 KG/M2 | HEART RATE: 93 BPM | OXYGEN SATURATION: 98 % | WEIGHT: 307 LBS | TEMPERATURE: 98.1 F | DIASTOLIC BLOOD PRESSURE: 79 MMHG | SYSTOLIC BLOOD PRESSURE: 136 MMHG

## 2020-12-28 PROCEDURE — 99283 EMERGENCY DEPT VISIT LOW MDM: CPT

## 2020-12-28 PROCEDURE — 70360 X-RAY EXAM OF NECK: CPT

## 2020-12-28 RX ORDER — TRAMADOL HYDROCHLORIDE 50 MG/1
50 TABLET ORAL EVERY 4 HOURS PRN
Qty: 18 TABLET | Refills: 0 | Status: SHIPPED | OUTPATIENT
Start: 2020-12-28 | End: 2020-12-31

## 2020-12-28 ASSESSMENT — PAIN DESCRIPTION - FREQUENCY: FREQUENCY: CONTINUOUS

## 2020-12-28 ASSESSMENT — PAIN SCALES - GENERAL: PAINLEVEL_OUTOF10: 7

## 2020-12-28 ASSESSMENT — PAIN DESCRIPTION - PAIN TYPE: TYPE: ACUTE PAIN

## 2020-12-28 ASSESSMENT — PAIN DESCRIPTION - LOCATION: LOCATION: THROAT

## 2020-12-28 ASSESSMENT — PAIN DESCRIPTION - DESCRIPTORS: DESCRIPTORS: STABBING;THROBBING

## 2020-12-29 NOTE — ED PROVIDER NOTES
[facility]  [facility]  Phone: [phone]      Pt Name: Carla Cunningham  MRN: [id number]  [place] 1990  Date of evaluation: 12/28/2020      CHIEF COMPLAINT       Chief Complaint   Patient presents with    Fall     pt fell down stairs c/o throat and head pain         HISTORY OF PRESENT ILLNESS    Carla Cunningham is a 35 y.o. male who presents   Chief Complaint   Patient presents with   Bertell Halim Fall     pt fell down stairs c/o throat and head pain   . 67-year-old male patient presents to the emergency department for evaluation of anterior neck pain in the oropharyngeal area as well as head injury about 1 hour prior to arrival.  Patient states he was pushing the [place] while going upstairs when he tripped and hit the anterior neck on the stairs and hit the head on the ceiling. No history of loss of consciousness. He has mild headache and complains of mostly anterior neck pain with pain with swallowing as well as difficulty in breathing. He grades pain is 8 out of 10 in intensity. He denies any neck pain and denies any tingling, numbness or weakness in any of the extremities. Patient has taken Tylenol without much relief. He denies any chest or abdominal injuries and denies any shortness of breath, lightheadedness or dizziness. REVIEW OF SYSTEMS       Review of Systems    All systems reviewed and negative unless noted in HPI. The patient denies fever or constitutional symptoms. Denies vision change. Denies any sore throat or rhinorrhea. Complains of anterior neck pain    Denies chest pain or shortness of breath. No nausea,  vomiting or diarrhea. Denies any dysuria. Denies urinary frequency or hematuria. Denies musculoskeletal injury or pain. Denies any weakness, numbness or focal neurologic deficit. Denies any skin rash or edema. No recent psychiatric issues. No easy bruising or bleeding. Denies any polyuria, polydypsia or history of immunocompromise. PAST MEDICAL HISTORY    has a past medical history of Anxiety, Asthma, Bipolar disorder ([address] Depression, GERD (gastroesophageal reflux disease), Headache, Hypertension, Hypothyroidism, Insomnia, and Unspecified sleep apnea. SURGICAL HISTORY      has a past surgical history that includes [place] tooth extraction; Upper gastrointestinal endoscopy; and Finger trigger release. CURRENT MEDICATIONS       Discharge Medication List as of 12/28/2020  9:13 PM      CONTINUE these medications which have NOT CHANGED    Details   Dextromethorphan-Guaifenesin (MUCINEX DM)  MG TB12 Take 1 tablet by mouth 2 times daily, Disp-28 tablet, R-0Print      carvedilol (COREG) 6.25 MG tablet Take 6.25 mg by mouth 2 times daily (with meals)      fexofenadine (ALLEGRA ALLERGY) 180 MG tablet Take 180 mg by mouth daily      ALPRAZolam (XANAX) 0.5 MG tablet Take 1 mg by mouth nightly as needed for Sleep       amLODIPine (NORVASC) 5 MG tablet   Take 10 mg by mouth daily       Multiple Vitamins-Minerals (CENTROVITE) TABS Historical Med      FLUoxetine (PROZAC) 40 MG capsule Total of 60 mg daily, Disp-30 capsule, R-11      VENTOLIN  (90 BASE) MCG/ACT inhaler Inhale 2 puffs into the lungs every 4 hours as needed for Wheezing or Shortness of Breath (none today). levothyroxine (SYNTHROID) 50 MCG tablet   75 mcg daily              ALLERGIES     is allergic to chantix [varenicline tartrate]; ciprofloxacin; nsaids; pcn [penicillins]; and tessalon [benzonatate]. FAMILY HISTORY     is adopted. family history is not on file. He was adopted. SOCIAL HISTORY      reports that he has been smoking cigarettes. He has a 2.50 pack-year smoking history. He has never used smokeless tobacco. He reports that he does not drink alcohol or use drugs.     PHYSICAL EXAM INITIAL VITALS:  height is 6' 4\" (1.93 m) and weight is 139.3 kg (307 lb) (abnormal). His oral temperature is 98.1 °F (36.7 °C). His blood pressure is 136/79 and his pulse is 93. His respiration is 20 and oxygen saturation is 98%. Physical Exam  Vitals signs and nursing note reviewed. Constitutional:       Appearance: He is well-developed. HENT:      Head: Normocephalic and atraumatic. Nose: Nose normal.   Eyes:      Extraocular Movements: Extraocular movements intact. Pupils: Pupils are equal, round, and reactive to light. Neck:      Musculoskeletal: Normal range of motion and neck supple. No edema or crepitus. Vascular: Normal carotid pulses. No carotid bruit. Trachea: Trachea and phonation normal.      Comments: Patient has vague tenderness in the anterior neck in the midline area. There is no evidence of any abrasion or contusions. Cardiovascular:      Rate and Rhythm: Normal rate and regular rhythm. Heart sounds: Normal heart sounds. No murmur. Pulmonary:      Effort: Pulmonary effort is normal. No respiratory distress. Breath sounds: Normal breath sounds. Abdominal:      General: Bowel sounds are normal. There is no distension. Palpations: Abdomen is soft. Tenderness: There is no abdominal tenderness. Skin:     General: Skin is warm and dry. Neurological:      General: No focal deficit present. Mental Status: He is alert and oriented to person, place, and time. GCS: GCS eye subscore is 4. GCS verbal subscore is 5. GCS motor subscore is 6. Cranial Nerves: Cranial nerves are intact. Sensory: Sensation is intact. Motor: Motor function is intact. Gait: Gait is intact.            DIFFERENTIAL DIAGNOSIS/ MDM:     Anterior neck contusion, tracheal injury, mild closed head injury    DIAGNOSTIC RESULTS EKG: All EKG's are interpreted by the Emergency Department Physician who either signs or Co-signs this chart in the absence of a cardiologist.    None obtained    RADIOLOGY:   I directly visualized the following  images and reviewed the radiologist interpretations:  XR NECK SOFT TISSUE   Final Result   Unremarkable soft tissue examination of the neck. No change from 11/01/2020. No evidence of an acute injury. Xr Neck Soft Tissue    Result Date: 12/28/2020  EXAMINATION: TWO XRAY VIEWS OF THE NECK SOFT TISSUES 12/28/2020 8:37 pm COMPARISON: 11/01/2020 HISTORY: ORDERING SYSTEM PROVIDED HISTORY: Ant neck injury TECHNOLOGIST PROVIDED HISTORY: Ant neck injury Reason for Exam: anterior neck/throat pain Acuity: Acute Type of Exam: Initial Mechanism of Injury: fall down stairs Relevant Medical/Surgical History: NA FINDINGS: There is unchanged straightening of the normal cervical lordosis. The epiglottis and aryepiglottic folds are normal.  Prevertebral soft tissues are unremarkable and unchanged. The airway is patent. No radiopaque foreign body. Hyoid bone and laryngeal cartilages are are unchanged. Unremarkable soft tissue examination of the neck. No change from 11/01/2020. No evidence of an acute injury. LABS:  Labs Reviewed - No data to display      EMERGENCY DEPARTMENT COURSE:   Vitals:    Vitals:    12/28/20 2020   BP: 136/79   Pulse: 93   Resp: 20   Temp: 98.1 °F (36.7 °C)   TempSrc: Oral   SpO2: 98%   Weight: (!) 139.3 kg (307 lb)   Height: 6' 4\" (1.93 m)     -------------------------  BP: 136/79, Temp: 98.1 °F (36.7 °C), Pulse: 93, Resp: 20    Orders Placed This Encounter   Medications    traMADol (ULTRAM) 50 MG tablet     Sig: Take 1 tablet by mouth every 4 hours as needed for Pain for up to 3 days. Intended supply: 3 days.  Take lowest dose possible to manage pain     Dispense:  18 tablet     Refill:  0

## 2021-12-27 ENCOUNTER — APPOINTMENT (OUTPATIENT)
Dept: GENERAL RADIOLOGY | Facility: CLINIC | Age: 31
End: 2021-12-27
Payer: MEDICARE

## 2021-12-27 ENCOUNTER — APPOINTMENT (OUTPATIENT)
Dept: CT IMAGING | Facility: CLINIC | Age: 31
End: 2021-12-27
Payer: MEDICARE

## 2021-12-27 ENCOUNTER — HOSPITAL ENCOUNTER (EMERGENCY)
Facility: CLINIC | Age: 31
Discharge: HOME | End: 2021-12-27
Attending: SPECIALIST
Payer: MEDICARE

## 2021-12-27 VITALS
TEMPERATURE: 98 F | HEART RATE: 92 BPM | OXYGEN SATURATION: 96 % | BODY MASS INDEX: 38.36 KG/M2 | HEIGHT: 76 IN | RESPIRATION RATE: 18 BRPM | SYSTOLIC BLOOD PRESSURE: 130 MMHG | DIASTOLIC BLOOD PRESSURE: 82 MMHG | WEIGHT: 315 LBS

## 2021-12-27 DIAGNOSIS — S16.1XXA ACUTE STRAIN OF NECK MUSCLE, INITIAL ENCOUNTER: Primary | ICD-10-CM

## 2021-12-27 DIAGNOSIS — S83.91XA SPRAIN OF RIGHT KNEE, UNSPECIFIED LIGAMENT, INITIAL ENCOUNTER: ICD-10-CM

## 2021-12-27 PROCEDURE — 73562 X-RAY EXAM OF KNEE 3: CPT

## 2021-12-27 PROCEDURE — 99283 EMERGENCY DEPT VISIT LOW MDM: CPT

## 2021-12-27 PROCEDURE — 72125 CT NECK SPINE W/O DYE: CPT

## 2021-12-27 PROCEDURE — 6370000000 HC RX 637 (ALT 250 FOR IP): Performed by: SPECIALIST

## 2021-12-27 RX ORDER — HYDROCODONE BITARTRATE AND ACETAMINOPHEN 5; 325 MG/1; MG/1
1 TABLET ORAL ONCE
Status: COMPLETED | OUTPATIENT
Start: 2021-12-27 | End: 2021-12-27

## 2021-12-27 RX ORDER — TIZANIDINE 4 MG/1
4 TABLET ORAL 3 TIMES DAILY PRN
Qty: 15 TABLET | Refills: 0 | Status: SHIPPED | OUTPATIENT
Start: 2021-12-27

## 2021-12-27 RX ADMIN — HYDROCODONE BITARTRATE AND ACETAMINOPHEN 1 TABLET: 5; 325 TABLET ORAL at 22:28

## 2021-12-27 ASSESSMENT — PAIN DESCRIPTION - DESCRIPTORS: DESCRIPTORS: ACHING

## 2021-12-27 ASSESSMENT — PAIN SCALES - GENERAL
PAINLEVEL_OUTOF10: 5
PAINLEVEL_OUTOF10: 7

## 2021-12-27 ASSESSMENT — PAIN DESCRIPTION - LOCATION: LOCATION: KNEE;NECK

## 2021-12-27 ASSESSMENT — PAIN DESCRIPTION - ORIENTATION
ORIENTATION: RIGHT
ORIENTATION: RIGHT

## 2021-12-27 ASSESSMENT — PAIN DESCRIPTION - PAIN TYPE: TYPE: ACUTE PAIN
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
## 2021-12-28 NOTE — ED NOTES
Patient states he took tylenol at 2 p.m. Patient states he is unable to take Ibuprofen  due to gastric bypass.       Angela Charles, RN  12/27/21 2003
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
## 2022-05-03 ENCOUNTER — HOSPITAL ENCOUNTER (EMERGENCY)
Facility: CLINIC | Age: 32
Discharge: HOME | End: 2022-05-03
Attending: EMERGENCY MEDICINE
Payer: MEDICARE

## 2022-05-03 VITALS
RESPIRATION RATE: 18 BRPM | DIASTOLIC BLOOD PRESSURE: 77 MMHG | SYSTOLIC BLOOD PRESSURE: 140 MMHG | WEIGHT: 315 LBS | OXYGEN SATURATION: 97 % | TEMPERATURE: 97.9 F | HEART RATE: 91 BPM | HEIGHT: 76 IN | BODY MASS INDEX: 38.36 KG/M2

## 2022-05-03 DIAGNOSIS — K57.92 DIVERTICULITIS: ICD-10-CM

## 2022-05-03 DIAGNOSIS — K29.00 ACUTE GASTRITIS WITHOUT HEMORRHAGE, UNSPECIFIED GASTRITIS TYPE: Primary | ICD-10-CM

## 2022-05-03 LAB
ABSOLUTE EOS #: 0.2 K/UL (ref 0–0.4)
ABSOLUTE LYMPH #: 3.1 K/UL (ref 1–4.8)
ABSOLUTE MONO #: 0.8 K/UL (ref 0.1–1.2)
ALBUMIN SERPL-MCNC: 4 G/DL (ref 3.5–5.2)
ALBUMIN/GLOBULIN RATIO: 1.4 (ref 1–2.5)
ALP BLD-CCNC: 108 U/L (ref 40–129)
ALT SERPL-CCNC: 36 U/L (ref 5–41)
ANION GAP SERPL CALCULATED.3IONS-SCNC: 8 MMOL/L (ref 9–17)
AST SERPL-CCNC: 18 U/L
BASOPHILS # BLD: 1 % (ref 0–2)
BASOPHILS ABSOLUTE: 0.1 K/UL (ref 0–0.2)
BILIRUB SERPL-MCNC: 0.3 MG/DL (ref 0.3–1.2)
BILIRUBIN URINE: NEGATIVE
BUN BLDV-MCNC: 8 MG/DL (ref 6–20)
CALCIUM SERPL-MCNC: 9 MG/DL (ref 8.6–10.4)
CHLORIDE BLD-SCNC: 104 MMOL/L (ref 98–107)
CO2: 26 MMOL/L (ref 20–31)
COLOR: YELLOW
COMMENT UA: NORMAL
CREAT SERPL-MCNC: 0.6 MG/DL (ref 0.7–1.2)
EOSINOPHILS RELATIVE PERCENT: 1 % (ref 1–4)
FLU A ANTIGEN: NEGATIVE
FLU B ANTIGEN: NEGATIVE
GFR AFRICAN AMERICAN: >60 ML/MIN
GFR NON-AFRICAN AMERICAN: >60 ML/MIN
GFR SERPL CREATININE-BSD FRML MDRD: ABNORMAL ML/MIN/{1.73_M2}
GLUCOSE BLD-MCNC: 66 MG/DL (ref 70–99)
GLUCOSE URINE: NEGATIVE
HCT VFR BLD CALC: 42.6 % (ref 41–53)
HEMOGLOBIN: 14.7 G/DL (ref 13.5–17.5)
KETONES, URINE: NEGATIVE
LEUKOCYTE ESTERASE, URINE: NEGATIVE
LIPASE: 58 U/L (ref 13–60)
LYMPHOCYTES # BLD: 24 % (ref 24–44)
MCH RBC QN AUTO: 30 PG (ref 26–34)
MCHC RBC AUTO-ENTMCNC: 34.5 G/DL (ref 31–37)
MCV RBC AUTO: 87.1 FL (ref 80–100)
MONOCYTES # BLD: 6 % (ref 2–11)
NITRITE, URINE: NEGATIVE
PDW BLD-RTO: 13.3 % (ref 12.5–15.4)
PH UA: 6.5 (ref 5–8)
PLATELET # BLD: 296 K/UL (ref 140–450)
PMV BLD AUTO: 7.5 FL (ref 6–12)
POTASSIUM SERPL-SCNC: 3.8 MMOL/L (ref 3.7–5.3)
PROTEIN UA: NEGATIVE
RBC # BLD: 4.88 M/UL (ref 4.5–5.9)
SEG NEUTROPHILS: 68 % (ref 36–66)
SEGMENTED NEUTROPHILS ABSOLUTE COUNT: 8.8 K/UL (ref 1.8–7.7)
SODIUM BLD-SCNC: 138 MMOL/L (ref 135–144)
SPECIFIC GRAVITY UA: 1.02 (ref 1–1.03)
TOTAL PROTEIN: 6.9 G/DL (ref 6.4–8.3)
TURBIDITY: CLEAR
URINE HGB: NEGATIVE
UROBILINOGEN, URINE: NORMAL
WBC # BLD: 13 K/UL (ref 3.5–11)

## 2022-05-03 PROCEDURE — 96372 THER/PROPH/DIAG INJ SC/IM: CPT

## 2022-05-03 PROCEDURE — 83690 ASSAY OF LIPASE: CPT

## 2022-05-03 PROCEDURE — 2580000003 HC RX 258: Performed by: REGISTERED NURSE

## 2022-05-03 PROCEDURE — 81003 URINALYSIS AUTO W/O SCOPE: CPT

## 2022-05-03 PROCEDURE — 36415 COLL VENOUS BLD VENIPUNCTURE: CPT

## 2022-05-03 PROCEDURE — 99284 EMERGENCY DEPT VISIT MOD MDM: CPT

## 2022-05-03 PROCEDURE — 85025 COMPLETE CBC W/AUTO DIFF WBC: CPT

## 2022-05-03 PROCEDURE — 80053 COMPREHEN METABOLIC PANEL: CPT

## 2022-05-03 PROCEDURE — 6360000002 HC RX W HCPCS: Performed by: REGISTERED NURSE

## 2022-05-03 PROCEDURE — 87804 INFLUENZA ASSAY W/OPTIC: CPT

## 2022-05-03 PROCEDURE — 96374 THER/PROPH/DIAG INJ IV PUSH: CPT

## 2022-05-03 RX ORDER — ONDANSETRON 4 MG/1
4 TABLET, ORALLY DISINTEGRATING ORAL 3 TIMES DAILY PRN
Qty: 21 TABLET | Refills: 0 | Status: SHIPPED | OUTPATIENT
Start: 2022-05-03 | End: 2022-09-24

## 2022-05-03 RX ORDER — METRONIDAZOLE 500 MG/1
500 TABLET ORAL 3 TIMES DAILY
Qty: 30 TABLET | Refills: 0 | Status: SHIPPED | OUTPATIENT
Start: 2022-05-03 | End: 2022-05-13

## 2022-05-03 RX ORDER — 0.9 % SODIUM CHLORIDE 0.9 %
1000 INTRAVENOUS SOLUTION INTRAVENOUS ONCE
Status: COMPLETED | OUTPATIENT
Start: 2022-05-03 | End: 2022-05-03

## 2022-05-03 RX ORDER — ONDANSETRON 4 MG/1
4 TABLET, ORALLY DISINTEGRATING ORAL ONCE
Status: DISCONTINUED | OUTPATIENT
Start: 2022-05-03 | End: 2022-05-03

## 2022-05-03 RX ORDER — DICYCLOMINE HYDROCHLORIDE 10 MG/ML
10 INJECTION INTRAMUSCULAR ONCE
Status: COMPLETED | OUTPATIENT
Start: 2022-05-03 | End: 2022-05-03

## 2022-05-03 RX ORDER — ONDANSETRON 2 MG/ML
4 INJECTION INTRAMUSCULAR; INTRAVENOUS ONCE
Status: COMPLETED | OUTPATIENT
Start: 2022-05-03 | End: 2022-05-03

## 2022-05-03 RX ADMIN — SODIUM CHLORIDE 1000 ML: 9 INJECTION, SOLUTION INTRAVENOUS at 14:08

## 2022-05-03 RX ADMIN — DICYCLOMINE HYDROCHLORIDE 10 MG: 10 INJECTION, SOLUTION INTRAMUSCULAR at 14:12

## 2022-05-03 RX ADMIN — ONDANSETRON 4 MG: 2 INJECTION INTRAMUSCULAR; INTRAVENOUS at 14:13

## 2022-05-03 ASSESSMENT — PAIN - FUNCTIONAL ASSESSMENT: PAIN_FUNCTIONAL_ASSESSMENT: 0-10

## 2022-05-03 ASSESSMENT — ENCOUNTER SYMPTOMS
SORE THROAT: 0
BACK PAIN: 1
DIARRHEA: 1
VOMITING: 1
NAUSEA: 1
COUGH: 0
SHORTNESS OF BREATH: 0
COLOR CHANGE: 0
ABDOMINAL PAIN: 1
BLOOD IN STOOL: 0

## 2022-05-03 ASSESSMENT — PAIN SCALES - GENERAL: PAINLEVEL_OUTOF10: 8

## 2022-05-03 NOTE — ED PROVIDER NOTES
[facility]  [address] [facility]  Phone: [phone]        Pt Name: Jarod Martinez  MRN: [id number]  [place] 1990  Date of evaluation: 5/3/22    CHIEFCOMPLAINT       Chief Complaint   Patient presents with    Emesis     Started 8pm     Back Pain       HISTORY OF PRESENT ILLNESS (Location/Symptom, Timing/Onset, Context/Setting, Quality, Duration, Modifying Factors, Severity)      Jarod Martinez is a 33 y.o. male with no pertinent PMH who presents to the ED via private auto with vomiting, abdominal pain, low back pain ongoing since approximately 8 PM last night. Patient is resting at home when he started having some vomiting and low back pain. He does state that he recently got over strep throat last week and has since stopped antibiotics. He denies any fevers or chills, chest pain, shortness of breath, difficulty breathing hematuria or dysuria. He reports diffuse lower abdominal pain with nonbloody diarrhea/emesis. He has not take any medications for symptoms. He states that nothing makes his symptoms better and that eating does make his symptoms worse. He describes the pain as an aching sensation. PAST MEDICAL / SURGICAL / SOCIAL / FAMILY HISTORY     PMH:  has a past medical history of Anxiety, Asthma, Bipolar disorder ([address] Depression, GERD (gastroesophageal reflux disease), Headache, Hypertension, Hypothyroidism, Insomnia, and Unspecified sleep apnea. Surgical History:  has a past surgical history that includes [place] tooth extraction; Upper gastrointestinal endoscopy; and Finger trigger release. Social History:  reports that he has been smoking cigarettes. He has a 2.50 pack-year smoking history. He has never used smokeless tobacco. He reports that he does not drink alcohol and does not use drugs. Family History: is adopted. family history is not on file. He was adopted.   Psychiatric History: None    Allergies: Chantix [varenicline tartrate], Ciprofloxacin, Nsaids, Pcn [penicillins], and Tessalon [benzonatate]    Home Medications:   Prior to Admission medications    Medication Sig Start Date End Date Taking? Authorizing Provider   metroNIDAZOLE (FLAGYL) 500 MG tablet Take 1 tablet by mouth 3 times daily for 10 days 5/3/22 5/13/22 Yes EDWARD Shultz - CNP   ondansetron (ZOFRAN-ODT) 4 MG disintegrating tablet Take 1 tablet by mouth 3 times daily as needed for Nausea or Vomiting 5/3/22  Yes EDWARD Doherty - CNP   tiZANidine (ZANAFLEX) 4 MG tablet Take 1 tablet by mouth 3 times daily as needed (pain) 12/27/21   Gurinder Carmen MD   Dextromethorphan-Guaifenesin (MUCINEX DM)  MG TB12 Take 1 tablet by mouth 2 times daily 2/9/18   [facility]   carvedilol (COREG) 6.25 MG tablet Take 6.25 mg by mouth 2 times daily (with meals)    Historical Provider, MD   fexofenadine (ALLEGRA ALLERGY) 180 MG tablet Take 180 mg by mouth daily    Historical Provider, MD   ALPRAZolam (XANAX) 0.5 MG tablet Take 1 mg by mouth nightly as needed for Sleep     Historical Provider, MD   amLODIPine (NORVASC) 5 MG tablet   Take 10 mg by mouth daily     Historical Provider, MD   Multiple Vitamins-Minerals (CENTROVITE) TABS  5/29/13   Historical Provider, MD   FLUoxetine (PROZAC) 40 MG capsule Total of 60 mg daily 11/15/12   DO RUBÉN CrawleyOLIN  (90 BASE) MCG/ACT inhaler Inhale 2 puffs into the lungs every 4 hours as needed for Wheezing or Shortness of Breath (none today). 7/7/12   Historical Provider, MD   levothyroxine (SYNTHROID) 50 MCG tablet   75 mcg daily  6/18/12   Historical Provider, MD       REVIEW OF SYSTEMS  (2-9 systems for level 4, 10 ormore for level 5)      Review of Systems   Constitutional: Negative for activity change, chills and fever. HENT: Negative for congestion and sore throat. Respiratory: Negative for cough and shortness of breath. Cardiovascular: Negative for chest pain and palpitations. Gastrointestinal: Positive for abdominal pain, diarrhea, nausea and vomiting. Negative for blood in stool. Genitourinary: Negative for dysuria, flank pain and hematuria. Musculoskeletal: Positive for back pain. Negative for neck pain and neck stiffness. Skin: Negative for color change, rash and wound. Neurological: Negative for dizziness and headaches. All other systems negative except as marked. PHYSICAL EXAM  (up to 7 for level 4, 8 or more for level 5)      INITIAL VITALS:  height is 6' 4\" (1.93 m) and weight is 160.1 kg (353 lb) (abnormal). His oral temperature is 97.9 °F (36.6 °C). His blood pressure is 140/77 (abnormal) and his pulse is 91. His respiration is 18 and oxygen saturation is 97%. Vital signs reviewed. Physical Exam  Constitutional:       General: He is not in acute distress. Appearance: Normal appearance. He is obese. He is not toxic-appearing. HENT:      Head: Normocephalic and atraumatic. Nose: Nose normal. No congestion or rhinorrhea. Mouth/Throat:      Mouth: Mucous membranes are moist.      Pharynx: Oropharynx is clear. No oropharyngeal exudate. Cardiovascular:      Rate and Rhythm: Normal rate and regular rhythm. Pulses: Normal pulses. Heart sounds: Normal heart sounds. Pulmonary:      Effort: Pulmonary effort is normal.      Breath sounds: Normal breath sounds. Abdominal:      General: Bowel sounds are normal. There is no distension. Palpations: Abdomen is soft. There is no hepatomegaly or splenomegaly. Tenderness: There is abdominal tenderness in the suprapubic area and left lower quadrant. There is no right CVA tenderness or left CVA tenderness. Negative signs include Wheatley's sign and McBurney's sign. Hernia: No hernia is present. Musculoskeletal:      Cervical back: Normal and neck supple. No rigidity. Thoracic back: Normal.      Lumbar back: Normal. No spasms or tenderness.       Right lower leg: No edema. Left lower leg: No edema. Skin:     General: Skin is warm and dry. Capillary Refill: Capillary refill takes less than 2 seconds. Neurological:      Mental Status: He is alert. Psychiatric:         Mood and Affect: Mood normal.         Behavior: Behavior normal.           DIFFERENTIAL DIAGNOSIS / MDM     Plan to obtain basic labs, urine, flu I will provide the patient with normal saline bolus, Zofran and Bentyl for comfort. Patient is resting the cot comfortably with even unlabored breaths and is nontoxic-appearing on my exam with no current distress. Rapid flu was negative. Lipase is negative. Urine is unremarkable. CMP is unremarkable. CBC shows mild leukocytosis. Patient states that he feels better after fluids and medications. Patient states that his symptoms are similar to when he had diverticulitis, Dr. Chris Hadley and I along with the patient plan to treat with Flagyl as patient is allergic to Cipro along with penicillins for diverticulitis. I also provide the patient with Zofran and to follow-up with his PCP within 1 day. Instructed patient return to the ER with any fevers, continued/worsening abdominal pain, bloody stools, chest pain, shortness of breath. Patient was agreement with this plan at this time. All question concerns were answered at this time. At this time the patient is without objective evidence of an acute process requiring hospitalization or inpatient management. They have remained hemodynamically stable throughout their entire ED visit and are stable for discharge with outpatient follow-up. The patient understands that at this time there is no evidence for a more malignant underlying process, but the patient also understands that early in the process of an illness or injury, an emergency department workup can be falsely reassuring.   Routine discharge counseling was given, and the patient understands that worsening, changing or persistent symptoms should prompt an immediate call or follow up with their primary physician or return to the emergency department. The importance of appropriate follow up was also discussed. I have reviewed the disposition diagnosis with the patient and or their family/guardian. I have answered their questions and given discharge instructions. They voiced understanding of these instructions and did not have any further questions or complaints. PLAN (LABS / IMAGING / EKG):  Orders Placed This Encounter   Procedures    Rapid Influenza A/B Antigens    CBC with Auto Differential    Comprehensive Metabolic Panel    Lipase    Urinalysis with Reflex to Culture    Place CT Compatible peripheral IV       MEDICATIONS ORDERED:  Orders Placed This Encounter   Medications    ondansetron (ZOFRAN) injection 4 mg    dicyclomine (BENTYL) injection 10 mg    0.9 % sodium chloride bolus    metroNIDAZOLE (FLAGYL) 500 MG tablet     Sig: Take 1 tablet by mouth 3 times daily for 10 days     Dispense:  30 tablet     Refill:  0    DISCONTD: ondansetron (ZOFRAN-ODT) disintegrating tablet 4 mg    ondansetron (ZOFRAN-ODT) 4 MG disintegrating tablet     Sig: Take 1 tablet by mouth 3 times daily as needed for Nausea or Vomiting     Dispense:  21 tablet     Refill:  0       Controlled Substances Monitoring:     DIAGNOSTIC RESULTS     EKG: All EKG's are interpreted by the Emergency Department Physician who either signs or Co-signs this chart in the absenceof a cardiologist.        RADIOLOGY: All images are read by the radiologist and their interpretations are reviewed. No orders to display       No results found.     LABS:  Results for orders placed or performed during the hospital encounter of 05/03/22   Rapid Influenza A/B Antigens    Specimen: Nasopharyngeal   Result Value Ref Range    Flu A Antigen NEGATIVE NEGATIVE    Flu B Antigen NEGATIVE NEGATIVE   CBC with Auto Differential   Result Value Ref Range    WBC 13.0 (H) 3.5 - 11.0 k/uL    RBC 4.88 4.5 - 5.9 m/uL    Hemoglobin 14.7 13.5 - 17.5 g/dL    Hematocrit 42.6 41 - 53 %    MCV 87.1 80 - 100 fL    MCH 30.0 26 - 34 pg    MCHC 34.5 31 - 37 g/dL    RDW 13.3 12.5 - 15.4 %    Platelets 240 767 - 076 k/uL    MPV 7.5 6.0 - 12.0 fL    Seg Neutrophils 68 (H) 36 - 66 %    Lymphocytes 24 24 - 44 %    Monocytes 6 2 - 11 %    Eosinophils % 1 1 - 4 %    Basophils 1 0 - 2 %    Segs Absolute 8.80 (H) 1.8 - 7.7 k/uL    Absolute Lymph # 3.10 1.0 - 4.8 k/uL    Absolute Mono # 0.80 0.1 - 1.2 k/uL    Absolute Eos # 0.20 0.0 - 0.4 k/uL    Basophils Absolute 0.10 0.0 - 0.2 k/uL   Comprehensive Metabolic Panel   Result Value Ref Range    Glucose 66 (L) 70 - 99 mg/dL    BUN 8 6 - 20 mg/dL    CREATININE 0.60 (L) 0.70 - 1.20 mg/dL    Calcium 9.0 8.6 - 10.4 mg/dL    Sodium 138 135 - 144 mmol/L    Potassium 3.8 3.7 - 5.3 mmol/L    Chloride 104 98 - 107 mmol/L    CO2 26 20 - 31 mmol/L    Anion Gap 8 (L) 9 - 17 mmol/L    Alkaline Phosphatase 108 40 - 129 U/L    ALT 36 5 - 41 U/L    AST 18 <40 U/L    Total Bilirubin 0.30 0.3 - 1.2 mg/dL    Total Protein 6.9 6.4 - 8.3 g/dL    Albumin 4.0 3.5 - 5.2 g/dL    Albumin/Globulin Ratio 1.4 1.0 - 2.5    GFR Non-African American >60 >60 mL/min    GFR African American >60 >60 mL/min    GFR Comment         Lipase   Result Value Ref Range    Lipase 58 13 - 60 U/L   Urinalysis with Reflex to Culture    Specimen: Urine, clean catch   Result Value Ref Range    Color, UA Yellow Yellow    Turbidity UA Clear Clear    Glucose, Ur NEGATIVE NEGATIVE    Bilirubin Urine NEGATIVE NEGATIVE    Ketones, Urine NEGATIVE NEGATIVE    Specific Gravity, UA 1.025 1.005 - 1.030    Urine Hgb NEGATIVE NEGATIVE    pH, UA 6.5 5.0 - 8.0    Protein, UA NEGATIVE NEGATIVE    Urobilinogen, Urine Normal Normal    Nitrite, Urine NEGATIVE NEGATIVE    Leukocyte Esterase, Urine NEGATIVE NEGATIVE    Urinalysis Comments       Microscopic exam not performed based on chemical results unless requested in original order.     Urinalysis Comments          Urinalysis Comments       Utilizing a urinalysis as the only screening method to exclude a potential uropathogen can be unreliable in many patient populations. Rapid screening tests are less sensitive than culture and if UTI is a clinical possibility, culture should be considered despite a negative urinalysis. EMERGENCY DEPARTMENT COURSE           Vitals:    Vitals:    22 1335   BP: (!) 140/77   Pulse: 91   Resp: 18   Temp: 97.9 °F (36.6 °C)   TempSrc: Oral   SpO2: 97%   Weight: (!) 160.1 kg (353 lb)   Height: 6' 4\" (1.93 m)     -------------------------  BP: (!) 140/77, Temp: 97.9 °F (36.6 °C), Pulse: 91, Resp: 18      RE-EVALUATION:  See ED Course notes above. CONSULTS:  None    PROCEDURES:  None    FINAL IMPRESSION      1. Acute gastritis without hemorrhage, unspecified gastritis type    2. Diverticulitis          DISPOSITION / PLAN     CONDITION ON DISPOSITION:   Stable for discharge.      PATIENT REFERRED TO:  [place] [address]  [phone]    Call in 1 day      [facility]  [address]  [phone]    If symptoms worsen      DISCHARGE MEDICATIONS:  Discharge Medication List as of 5/3/2022  3:35 PM      START taking these medications    Details   metroNIDAZOLE (FLAGYL) 500 MG tablet Take 1 tablet by mouth 3 times daily for 10 days, Disp-30 tablet, R-0Normal             EDWARD Ryan CNP   Emergency Medicine Nurse Practitioner    (Please note that portions of this note were completed with a voice recognition program.  Efforts were made to edit the dictations but occasionally words aremis-transcribed.)       EDWARD Ryan CNP  22 [phone]

## 2022-05-03 NOTE — ED PROVIDER NOTES
Attending Supervisory Note/Shared Visit   I have personally performed a face to face diagnostic evaluation on this patient. I have reviewed the mid-levels findings and agree.   History and Exam by me shows an alert and oriented patient seen with extender I agree with the assessment treatment plan and disposition      (Please note that portions of this note were completed with a voice recognition program.  Efforts were made to edit the dictations but occasionally words are mis-transcribed.)    Margo Perez MD  Attending Emergency Physician      Margo Perez MD  05/03/22 0050

## 2022-06-27 ENCOUNTER — HOSPITAL ENCOUNTER (EMERGENCY)
Facility: CLINIC | Age: 32
Discharge: HOME | End: 2022-06-27
Attending: EMERGENCY MEDICINE
Payer: MEDICARE

## 2022-06-27 VITALS
TEMPERATURE: 98 F | HEART RATE: 80 BPM | BODY MASS INDEX: 38.36 KG/M2 | WEIGHT: 315 LBS | SYSTOLIC BLOOD PRESSURE: 102 MMHG | HEIGHT: 76 IN | DIASTOLIC BLOOD PRESSURE: 66 MMHG | RESPIRATION RATE: 16 BRPM | OXYGEN SATURATION: 98 %

## 2022-06-27 DIAGNOSIS — K92.0 HEMATEMESIS WITH NAUSEA: Primary | ICD-10-CM

## 2022-06-27 LAB
ABSOLUTE EOS #: 0.3 K/UL (ref 0–0.4)
ABSOLUTE LYMPH #: 2.5 K/UL (ref 1–4.8)
ABSOLUTE MONO #: 0.6 K/UL (ref 0.1–1.2)
BASOPHILS # BLD: 1 % (ref 0–2)
BASOPHILS ABSOLUTE: 0 K/UL (ref 0–0.2)
EOSINOPHILS RELATIVE PERCENT: 4 % (ref 1–4)
HCT VFR BLD CALC: 39.8 % (ref 41–53)
HEMOGLOBIN: 13.4 G/DL (ref 13.5–17.5)
LYMPHOCYTES # BLD: 31 % (ref 24–44)
MCH RBC QN AUTO: 30 PG (ref 26–34)
MCHC RBC AUTO-ENTMCNC: 33.7 G/DL (ref 31–37)
MCV RBC AUTO: 88.9 FL (ref 80–100)
MONOCYTES # BLD: 7 % (ref 2–11)
PDW BLD-RTO: 13.8 % (ref 12.5–15.4)
PLATELET # BLD: 338 K/UL (ref 140–450)
PMV BLD AUTO: 7.8 FL (ref 6–12)
RBC # BLD: 4.48 M/UL (ref 4.5–5.9)
S PYO AG THROAT QL: NEGATIVE
SEG NEUTROPHILS: 57 % (ref 36–66)
SEGMENTED NEUTROPHILS ABSOLUTE COUNT: 4.5 K/UL (ref 1.8–7.7)
SOURCE: NORMAL
WBC # BLD: 7.9 K/UL (ref 3.5–11)

## 2022-06-27 PROCEDURE — 85025 COMPLETE CBC W/AUTO DIFF WBC: CPT

## 2022-06-27 PROCEDURE — 36415 COLL VENOUS BLD VENIPUNCTURE: CPT

## 2022-06-27 PROCEDURE — 99283 EMERGENCY DEPT VISIT LOW MDM: CPT

## 2022-06-27 PROCEDURE — 87880 STREP A ASSAY W/OPTIC: CPT

## 2022-06-27 PROCEDURE — 6370000000 HC RX 637 (ALT 250 FOR IP): Performed by: EMERGENCY MEDICINE

## 2022-06-27 RX ORDER — ONDANSETRON 4 MG/1
4 TABLET, ORALLY DISINTEGRATING ORAL EVERY 8 HOURS PRN
Status: DISCONTINUED | OUTPATIENT
Start: 2022-06-27 | End: 2022-06-28 | Stop reason: HOSPADM

## 2022-06-27 RX ORDER — ONDANSETRON 4 MG/1
4 TABLET, ORALLY DISINTEGRATING ORAL EVERY 8 HOURS PRN
Qty: 20 TABLET | Refills: 0 | Status: SHIPPED | OUTPATIENT
Start: 2022-06-27 | End: 2022-09-24 | Stop reason: SDUPTHER

## 2022-06-27 RX ORDER — PANTOPRAZOLE SODIUM 20 MG/1
20 TABLET, DELAYED RELEASE ORAL DAILY
Qty: 30 TABLET | Refills: 0 | Status: SHIPPED | OUTPATIENT
Start: 2022-06-27

## 2022-06-27 RX ADMIN — ONDANSETRON 4 MG: 4 TABLET, ORALLY DISINTEGRATING ORAL at 21:48

## 2022-06-27 ASSESSMENT — ENCOUNTER SYMPTOMS
EYES NEGATIVE: 1
SORE THROAT: 1
GASTROINTESTINAL NEGATIVE: 1
ALLERGIC/IMMUNOLOGIC NEGATIVE: 1
RESPIRATORY NEGATIVE: 1

## 2022-06-27 ASSESSMENT — PAIN SCALES - GENERAL: PAINLEVEL_OUTOF10: 8

## 2022-06-27 ASSESSMENT — PAIN DESCRIPTION - LOCATION: LOCATION: THROAT

## 2022-06-27 ASSESSMENT — PAIN - FUNCTIONAL ASSESSMENT: PAIN_FUNCTIONAL_ASSESSMENT: 0-10

## 2022-06-28 NOTE — ED PROVIDER NOTES
eMERGENCY dEPARTMENT eNCOUnter      Pt Name: Courtney Najera  MRN: [id number]  [place] 1990  Date of evaluation: 6/27/2022      CHIEF COMPLAINT       Chief Complaint   Patient presents with    Post-op Problem     tonsils and adnoids removed on June 16th          [address] [place] [address]    Courtney Najera is a 33 y.o. male who presents the emergency department with a reported hematemesis. He is patient said he had some bright red blood this morning he says he is taken some Phenergan Percocet and he had some pain he continues to have nausea. There is no sign of bleeding here today- his vitals are all stable and his SPO2 is 98%. REVIEW OF SYSTEMS     Review of Systems   Constitutional: Negative. HENT: Positive for sore throat. Eyes: Negative. Respiratory: Negative. Cardiovascular: Negative. Gastrointestinal: Negative. Endocrine: Negative. Genitourinary: Negative. Musculoskeletal: Negative. Skin: Negative. Allergic/Immunologic: Negative. Neurological: Negative. Hematological: Negative. Psychiatric/Behavioral: Negative. PAST MEDICAL HISTORY    has a past medical history of Anxiety, Asthma, Bipolar disorder ([address] Depression, GERD (gastroesophageal reflux disease), Headache, Hypertension, Hypothyroidism, Insomnia, and Unspecified sleep apnea. SURGICAL HISTORY      has a past surgical history that includes [place] tooth extraction; Upper gastrointestinal endoscopy; and Finger trigger release.     CURRENT MEDICATIONS       Previous Medications    ALPRAZOLAM (XANAX) 0.5 MG TABLET    Take 1 mg by mouth nightly as needed for Sleep     AMLODIPINE (NORVASC) 5 MG TABLET      Take 10 mg by mouth daily     CARVEDILOL (COREG) 6.25 MG TABLET    Take 6.25 mg by mouth 2 times daily (with meals)    DEXTROMETHORPHAN-GUAIFENESIN (MUCINEX DM)  MG TB12    Take 1 tablet by mouth 2 times daily    FEXOFENADINE (ALLEGRA ALLERGY) 180 MG TABLET    Take 180 mg by mouth daily    FLUOXETINE (PROZAC) 40 MG CAPSULE    Total of 60 mg daily    LEVOTHYROXINE (SYNTHROID) 50 MCG TABLET      75 mcg daily     MULTIPLE VITAMINS-MINERALS (CENTROVITE) TABS        ONDANSETRON (ZOFRAN-ODT) 4 MG DISINTEGRATING TABLET    Take 1 tablet by mouth 3 times daily as needed for Nausea or Vomiting    TIZANIDINE (ZANAFLEX) 4 MG TABLET    Take 1 tablet by mouth 3 times daily as needed (pain)    VENTOLIN  (90 BASE) MCG/ACT INHALER    Inhale 2 puffs into the lungs every 4 hours as needed for Wheezing or Shortness of Breath (none today). ALLERGIES     is allergic to chantix [varenicline tartrate], ciprofloxacin, nsaids, pcn [penicillins], and tessalon [benzonatate]. FAMILY HISTORY     is adopted. family history is not on file. He was adopted. SOCIAL HISTORY      reports that he has been smoking cigarettes. He has a 2.50 pack-year smoking history. He has never used smokeless tobacco. He reports that he does not drink alcohol and does not use drugs. PHYSICAL EXAM     INITIAL VITALS:  height is 6' 4\" (1.93 m) and weight is 160.6 kg (354 lb) (abnormal). His oral temperature is 98 °F (36.7 °C). His blood pressure is 102/66 and his pulse is 80. His respiration is 16 and oxygen saturation is 98%. Constitutional: Alert, oriented x3, nontoxic, afebrile, answering questions appropriately, acting properly for age, in no acute distress  HEENT: Extraocular muscles intact, mucus membranes moist, TMs clear bilaterally, no posterior pharyngeal erythema or exudates, Pupils equal, round, reactive to light,   Neck: Trachea midline, Supple without lymphadenopathy, no posterior midline neck tenderness to palpation  Cardiovascular: Regular rhythm and rate no S3, S4, or murmurs  Respiratory: Clear to auscultation bilaterally no wheezes, rhonchi, rales, no respiratory distress  Gastrointestinal: Soft, nontender, nondistended, positive bowel sounds. No rebound, rigidity, or guarding.   Musculoskeletal: No extremity pain or swelling  Neurologic: Moving all 4 extremities without difficulty there are no gross focal neurologic deficits  Skin: Warm and dry      DIFFERENTIAL DIAGNOSIS/ MDM:   No sign of any bleeding is noted the operative site looks perfectly clean. Patient does have a little bit of swollen uvula go to go get a CBC a rapid strep and then reevaluate him. DIAGNOSTIC RESULTS     EKG: All EKG's are interpreted by the Emergency Department Physician who either signs or Co-signs this chart in the absence of a cardiologist.        Not indicated unless otherwise documented above    LABS:  Results for orders placed or performed during the hospital encounter of 06/27/22   Strep Screen Group A Throat    Specimen: Throat   Result Value Ref Range    Source . THROAT SWAB     Strep A Ag NEGATIVE NEGATIVE   CBC with Auto Differential   Result Value Ref Range    WBC 7.9 3.5 - 11.0 k/uL    RBC 4.48 (L) 4.5 - 5.9 m/uL    Hemoglobin 13.4 (L) 13.5 - 17.5 g/dL    Hematocrit 39.8 (L) 41 - 53 %    MCV 88.9 80 - 100 fL    MCH 30.0 26 - 34 pg    MCHC 33.7 31 - 37 g/dL    RDW 13.8 12.5 - 15.4 %    Platelets 733 308 - 514 k/uL    MPV 7.8 6.0 - 12.0 fL    Seg Neutrophils 57 36 - 66 %    Lymphocytes 31 24 - 44 %    Monocytes 7 2 - 11 %    Eosinophils % 4 1 - 4 %    Basophils 1 0 - 2 %    Segs Absolute 4.50 1.8 - 7.7 k/uL    Absolute Lymph # 2.50 1.0 - 4.8 k/uL    Absolute Mono # 0.60 0.1 - 1.2 k/uL    Absolute Eos # 0.30 0.0 - 0.4 k/uL    Basophils Absolute 0.00 0.0 - 0.2 k/uL       Not indicated unless otherwise documented above    RADIOLOGY:   I reviewed the radiologist interpretations:  No orders to display       Not indicated unless otherwise documented above    EMERGENCY DEPARTMENT COURSE:     The patient was given the following medications:  Orders Placed This Encounter   Medications    ondansetron (ZOFRAN-ODT) disintegrating tablet 4 mg    ondansetron (ZOFRAN ODT) 4 MG disintegrating tablet     Sig: Take 1 tablet by mouth every 8 hours as needed for Nausea     Dispense:  20 tablet     Refill:  0    pantoprazole (PROTONIX) 20 MG tablet     Sig: Take 1 tablet by mouth daily     Dispense:  30 tablet     Refill:  0        Vitals:    Vitals:    06/27/22 2129   BP: 102/66   Pulse: 80   Resp: 16   Temp: 98 °F (36.7 °C)   TempSrc: Oral   SpO2: 98%   Weight: (!) 160.6 kg (354 lb)   Height: 6' 4\" (1.93 m)     -------------------------  /66   Pulse 80   Temp 98 °F (36.7 °C) (Oral)   Resp 16   Ht 6' 4\" (1.93 m)   Wt (!) 160.6 kg (354 lb)   SpO2 98%   BMI 43.09 kg/m²         I have reviewed the disposition diagnosis with the patient and or their family/guardian. I have answered their questions and given discharge instructions. They voiced understanding of these instructions and did not have any further questions or complaints. CRITICAL CARE:    None    CONSULTS:    None    PROCEDURES:    None      OARRS Report if indicated             FINAL IMPRESSION      1. Hematemesis with nausea          DISPOSITION/PLAN   DISPOSITION Decision To Discharge    I have reviewed the disposition diagnosis with the patient and or their family/guardian. I have answered their questions and given discharge instructions. They voiced understanding of these instructions and did not have any further questions or complaints. Reevaluation: Patient's CBC is within normal limits rapid strep is negative. There is no sign of any bleeding is going on right now patient may have some gastritis which is make may be causing some stomach bleeding and that might be what is happening with him he can be discharged home on Zofran he is I will put him on some Protonix and follow-up with his ENT doctor in the morning and return if worse. He is stable for discharge home.       PATIENT REFERRED TO:  Robin Bee  [address] [place] [address] [address] [place] [id number] [phone]    In 2 days        DISCHARGE MEDICATIONS:  New Prescriptions    ONDANSETRON (ZOFRAN ODT) 4 MG DISINTEGRATING TABLET    Take 1 tablet by mouth every 8 hours as needed for Nausea    PANTOPRAZOLE (PROTONIX) 20 MG TABLET    Take 1 tablet by mouth daily       (Please note that portions of this note were completed with a voice recognition program.  Efforts were made to edit the dictations but occasionally words are mis-transcribed.)    Lashawn Bloom MD  Attending Emergency Physician            Lashawn Bloom MD  06/27/22 8576

## 2022-06-28 NOTE — ED TRIAGE NOTES
Patient states he had surgery too have tonsils and adenoids removed on June 16th  Patient states his throat started bleeding bright red blood this morning. Patient states he took his phenergan, and percocet with no relief. Patient continues to have nausea. Patient states he went to [facility] where surgery was and the wait was 3 hours so he came to ED to be evaluated.

## 2022-08-30 ENCOUNTER — HOSPITAL ENCOUNTER (EMERGENCY)
Facility: CLINIC | Age: 32
Discharge: HOME | End: 2022-08-31
Attending: EMERGENCY MEDICINE
Payer: MEDICARE

## 2022-08-30 ENCOUNTER — APPOINTMENT (OUTPATIENT)
Dept: GENERAL RADIOLOGY | Facility: CLINIC | Age: 32
End: 2022-08-30
Payer: MEDICARE

## 2022-08-30 VITALS
BODY MASS INDEX: 38.36 KG/M2 | SYSTOLIC BLOOD PRESSURE: 128 MMHG | TEMPERATURE: 97.7 F | HEART RATE: 88 BPM | WEIGHT: 315 LBS | HEIGHT: 76 IN | RESPIRATION RATE: 18 BRPM | DIASTOLIC BLOOD PRESSURE: 85 MMHG | OXYGEN SATURATION: 98 %

## 2022-08-30 DIAGNOSIS — S90.32XA CONTUSION OF LEFT FOOT, INITIAL ENCOUNTER: ICD-10-CM

## 2022-08-30 DIAGNOSIS — S92.502A CLOSED FRACTURE OF PHALANX OF LEFT FIFTH TOE, INITIAL ENCOUNTER: Primary | ICD-10-CM

## 2022-08-30 PROCEDURE — 73630 X-RAY EXAM OF FOOT: CPT

## 2022-08-30 PROCEDURE — 99283 EMERGENCY DEPT VISIT LOW MDM: CPT

## 2022-08-30 RX ORDER — ACETAMINOPHEN 500 MG
1000 TABLET ORAL ONCE
Status: COMPLETED | OUTPATIENT
Start: 2022-08-31 | End: 2022-08-31

## 2022-08-31 PROCEDURE — 6370000000 HC RX 637 (ALT 250 FOR IP): Performed by: EMERGENCY MEDICINE

## 2022-08-31 RX ORDER — OXYCODONE HYDROCHLORIDE 5 MG/1
5 TABLET ORAL ONCE
Status: COMPLETED | OUTPATIENT
Start: 2022-08-31 | End: 2022-08-31

## 2022-08-31 RX ADMIN — ACETAMINOPHEN 1000 MG: 500 TABLET ORAL at 00:17

## 2022-08-31 RX ADMIN — OXYCODONE 5 MG: 5 TABLET ORAL at 01:25

## 2022-08-31 NOTE — DISCHARGE INSTRUCTIONS
Take your medication as indicated and prescribed. For pain use ibuprofen (Motrin / Advil) or acetaminophen (Tylenol), unless prescribed medications that have acetaminophen in it. You can take over the counter acetaminophen tablets (1 - 2 tablets of the 500-mg strength every 6 hours) or ibuprofen tablets (2 tablets every 4 hours). Wear the post op shoe when walking until you are seen by the podiatrist or family doctor. Keep your leg elevated above your heart as much as possible to help reduce swelling. PLEASE RETURN TO THE EMERGENCY DEPARTMENT IMMEDIATELY for worsening symptoms, pain not controlled with the prescribed / over the counter pain medication, numbness or tingling in your feet or toes, increased swelling to your toes, or if you develop any concerning symptoms such as: high fever not relieved by acetaminophen (Tylenol) and/or ibuprofen (Motrin / Advil), chills, shortness of breath, chest pain, feeling of your heart fluttering or racing, persistent nausea and/or vomiting, vomiting up blood, blood in your stool, numbness, loss of consciousness, weakness or tingling in the arms or legs or change in color of the extremities, changes in mental status, persistent headache, blurry vision, loss of bladder / bowel control, unable to follow up with your physician, or other any other care or concern.
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
## 2022-09-21 ENCOUNTER — HOSPITAL ENCOUNTER (EMERGENCY)
Facility: CLINIC | Age: 32
Discharge: HOME | End: 2022-09-21
Attending: EMERGENCY MEDICINE
Payer: MEDICARE

## 2022-09-21 VITALS
HEIGHT: 76 IN | DIASTOLIC BLOOD PRESSURE: 86 MMHG | RESPIRATION RATE: 18 BRPM | SYSTOLIC BLOOD PRESSURE: 133 MMHG | HEART RATE: 84 BPM | TEMPERATURE: 98.1 F | OXYGEN SATURATION: 98 % | BODY MASS INDEX: 38.36 KG/M2 | WEIGHT: 315 LBS

## 2022-09-21 DIAGNOSIS — R19.7 NAUSEA VOMITING AND DIARRHEA: Primary | ICD-10-CM

## 2022-09-21 DIAGNOSIS — R11.2 NAUSEA VOMITING AND DIARRHEA: Primary | ICD-10-CM

## 2022-09-21 LAB
SARS-COV-2, RAPID: NOT DETECTED
SPECIMEN DESCRIPTION: NORMAL

## 2022-09-21 PROCEDURE — 99284 EMERGENCY DEPT VISIT MOD MDM: CPT

## 2022-09-21 PROCEDURE — 87635 SARS-COV-2 COVID-19 AMP PRB: CPT

## 2022-09-21 PROCEDURE — 6360000002 HC RX W HCPCS: Performed by: EMERGENCY MEDICINE

## 2022-09-21 PROCEDURE — 96372 THER/PROPH/DIAG INJ SC/IM: CPT

## 2022-09-21 RX ORDER — DIPHENOXYLATE HYDROCHLORIDE AND ATROPINE SULFATE 2.5; .025 MG/1; MG/1
1 TABLET ORAL 4 TIMES DAILY PRN
Qty: 20 TABLET | Refills: 0 | Status: SHIPPED | OUTPATIENT
Start: 2022-09-21 | End: 2022-10-01

## 2022-09-21 RX ORDER — KETOROLAC TROMETHAMINE 30 MG/ML
30 INJECTION, SOLUTION INTRAMUSCULAR; INTRAVENOUS ONCE
Status: COMPLETED | OUTPATIENT
Start: 2022-09-21 | End: 2022-09-21

## 2022-09-21 RX ORDER — ONDANSETRON 2 MG/ML
4 INJECTION INTRAMUSCULAR; INTRAVENOUS ONCE
Status: COMPLETED | OUTPATIENT
Start: 2022-09-21 | End: 2022-09-21

## 2022-09-21 RX ORDER — ACETAMINOPHEN 500 MG
1000 TABLET ORAL EVERY 6 HOURS PRN
COMMUNITY
End: 2022-09-24 | Stop reason: SDUPTHER

## 2022-09-21 RX ORDER — HYDROCODONE BITARTRATE AND ACETAMINOPHEN 5; 325 MG/1; MG/1
1 TABLET ORAL EVERY 6 HOURS PRN
Qty: 12 TABLET | Refills: 0 | Status: SHIPPED | OUTPATIENT
Start: 2022-09-21 | End: 2022-09-24

## 2022-09-21 RX ORDER — PROMETHAZINE HYDROCHLORIDE 12.5 MG/1
12.5 TABLET ORAL EVERY 6 HOURS PRN
COMMUNITY

## 2022-09-21 RX ORDER — ONDANSETRON 4 MG/1
4 TABLET, ORALLY DISINTEGRATING ORAL EVERY 8 HOURS PRN
Qty: 20 TABLET | Refills: 0 | Status: SHIPPED | OUTPATIENT
Start: 2022-09-21 | End: 2022-09-24

## 2022-09-21 RX ADMIN — KETOROLAC TROMETHAMINE 30 MG: 30 INJECTION, SOLUTION INTRAMUSCULAR at 12:43

## 2022-09-21 RX ADMIN — ONDANSETRON 4 MG: 2 INJECTION INTRAMUSCULAR; INTRAVENOUS at 12:42

## 2022-09-21 ASSESSMENT — PAIN - FUNCTIONAL ASSESSMENT: PAIN_FUNCTIONAL_ASSESSMENT: 0-10

## 2022-09-21 ASSESSMENT — PAIN SCALES - GENERAL
PAINLEVEL_OUTOF10: 7
PAINLEVEL_OUTOF10: 7

## 2022-09-21 NOTE — LETTER
[facility] ED  [address] [facility]  Phone: [phone]               September 21, 2022    Patient: Mundo Woods   YOB: 1990   Date of Visit: 9/21/2022       To Whom It May Concern:    Patria Muñoz was seen and treated in our emergency department on 9/21/2022. He should isolate at home through 09/23/22.       Sincerely,       Socrates Covarrubias MD         Signature:__________________________________

## 2022-09-21 NOTE — DISCHARGE INSTRUCTIONS
your family doctor or return to the ER immediately. Tell us how we did during your visit at [email or web address]   and let us know about your experience

## 2022-09-21 NOTE — ED PROVIDER NOTES
[address] ENCOUNTER      Pt Name: Larry Monteiro  MRN: [id number]  [place] 1990  Date of evaluation: 9/21/2022      CHIEF COMPLAINT       Chief Complaint   Patient presents with    Headache    Emesis         HISTORY OF PRESENT ILLNESS      The patient presents with vomiting and aches. The patient said he had diarrhea a couple days ago as well. He was exposed to his sister who has [place]. He has a history of gastric bypass so he can only take Tylenol for his aches and fever. He says he tried Imodium but it did not help his diarrhea. He says he has been drinking sports drinks and water but has been able to eat. He has not had a rash. He denies chest pain or shortness of breath. Nothing makes his symptoms better or worse otherwise. REVIEW OF SYSTEMS       All systems reviewed and negative unless noted in HPI. The patient reports body aches and fever. Denies vision change. Denies any sore throat or rhinorrhea. Denies any neck pain or stiffness. Denies chest pain or shortness of breath. Nausea vomiting and diarrhea as noted in HPI. History of gastric bypass. Denies any dysuria. Denies urinary frequency or hematuria. Denies musculoskeletal injury. Reports body aches. Denies any weakness, numbness or focal neurologic deficit. Denies any skin rash or edema. No recent psychiatric issues. No easy bruising or bleeding. Denies any polyuria, polydypsia or history of immunocompromise. PAST MEDICAL HISTORY    has a past medical history of Anxiety, Asthma, Bipolar disorder ([address] Depression, GERD (gastroesophageal reflux disease), Headache, Hypertension, Hypothyroidism, Insomnia, and Unspecified sleep apnea. SURGICAL HISTORY      has a past surgical history that includes [place] tooth extraction; Upper gastrointestinal endoscopy; Finger trigger release; Tonsillectomy; and Gastric bypass surgery.     CURRENT MEDICATIONS Previous Medications    ACETAMINOPHEN (TYLENOL) 500 MG TABLET    Take 1,000 mg by mouth every 6 hours as needed for Pain    ALPRAZOLAM (XANAX) 0.5 MG TABLET    Take 1 mg by mouth nightly as needed for Sleep     AMLODIPINE (NORVASC) 5 MG TABLET      Take 10 mg by mouth daily     CARVEDILOL (COREG) 6.25 MG TABLET    Take 6.25 mg by mouth 2 times daily (with meals)    DEXTROMETHORPHAN-GUAIFENESIN (MUCINEX DM)  MG TB12    Take 1 tablet by mouth 2 times daily    FEXOFENADINE (ALLEGRA) 180 MG TABLET    Take 180 mg by mouth daily    FLUOXETINE (PROZAC) 40 MG CAPSULE    Total of 60 mg daily    LEVOTHYROXINE (SYNTHROID) 50 MCG TABLET      75 mcg daily     MULTIPLE VITAMINS-MINERALS (CENTROVITE) TABS        ONDANSETRON (ZOFRAN ODT) 4 MG DISINTEGRATING TABLET    Take 1 tablet by mouth every 8 hours as needed for Nausea    ONDANSETRON (ZOFRAN-ODT) 4 MG DISINTEGRATING TABLET    Take 1 tablet by mouth 3 times daily as needed for Nausea or Vomiting    PANTOPRAZOLE (PROTONIX) 20 MG TABLET    Take 1 tablet by mouth daily    PROMETHAZINE (PHENERGAN) 12.5 MG TABLET    Take 12.5 mg by mouth every 6 hours as needed for Nausea    TIZANIDINE (ZANAFLEX) 4 MG TABLET    Take 1 tablet by mouth 3 times daily as needed (pain)    VENTOLIN  (90 BASE) MCG/ACT INHALER    Inhale 2 puffs into the lungs every 4 hours as needed for Wheezing or Shortness of Breath (none today). ALLERGIES     is allergic to chantix [varenicline tartrate], ciprofloxacin, nsaids, pcn [penicillins], tessalon [benzonatate], and tolmetin. FAMILY HISTORY     is adopted. family history is not on file. He was adopted. SOCIAL HISTORY      reports that he has been smoking cigarettes. He has been smoking an average of .5 packs per day. He has never used smokeless tobacco. He reports current alcohol use. He reports that he does not use drugs. PHYSICAL EXAM     INITIAL VITALS:  height is 6' 4\" (1.93 m) and weight is 149.7 kg (330 lb) (abnormal). His oral temperature is 98.1 °F (36.7 °C). His blood pressure is 133/86 and his pulse is 84. His respiration is 18 and oxygen saturation is 98%. The patient is alert and oriented, in no apparent distress. HEENT is atraumatic. Pupils are PERRL at 4 mm. Mucous membranes moist.    Neck is supple. Heart sounds regular rate and rhythm with no gallops, murmurs, or rubs. Lungs clear, no wheezes, rales or rhonchi. Abdomen: soft, nontender with no pain to palpation. CVA tenderness. BMI 41.3. Musculoskeletal exam shows no evidence of trauma. Normal distal pulses in all extremities. Skin: no rash or edema. Neurological exam reveals cranial nerves 2 through 12 grossly intact. Patient has equal  and normal deep tendon reflexes. Psychiatric: no hallucinations or suicidal ideation. Lymphatics.:  No lymphadenopathy. DIFFERENTIAL DIAGNOSIS/ MDM:     COVID-19, dehydration, vomiting, diarrhea    DIAGNOSTIC RESULTS         LABS:  Results for orders placed or performed during the hospital encounter of 09/21/22   COVID-19, Rapid    Specimen: Nasopharyngeal Swab   Result Value Ref Range    Specimen Description . NASOPHARYNGEAL SWAB     SARS-CoV-2, Rapid Not Detected Not Detected         EMERGENCY DEPARTMENT COURSE:   Vitals:    Vitals:    09/21/22 1236   BP: 133/86   Pulse: 84   Resp: 18   Temp: 98.1 °F (36.7 °C)   TempSrc: Oral   SpO2: 98%   Weight: (!) 149.7 kg (330 lb)   Height: 6' 4\" (1.93 m)     -------------------------  BP: 133/86, Temp: 98.1 °F (36.7 °C), Heart Rate: 84, Resp: 18      Re-evaluation Notes    The patient's COVID test was negative but his symptoms are quite some more. He certainly has a viral presentation. I will write for antiemetics and medicine for pain and diarrhea as well. I written him a work note as well. The patient appears well-hydrated. He is discharged in good condition. FINAL IMPRESSION      1.  Nausea vomiting and diarrhea          DISPOSITION/PLAN DISPOSITION Decision To Discharge 09/21/2022 01:13:42 PM      Condition on Disposition    good    PATIENT REFERRED TO:  your doctor    In 3 days  As needed    DISCHARGE MEDICATIONS:  New Prescriptions    DIPHENOXYLATE-ATROPINE (LOMOTIL) 2.5-0.025 MG PER TABLET    Take 1 tablet by mouth 4 times daily as needed for Diarrhea for up to 10 days. HYDROCODONE-ACETAMINOPHEN (NORCO) 5-325 MG PER TABLET    Take 1 tablet by mouth every 6 hours as needed for Pain for up to 3 days. Intended supply: 3 days.  Take lowest dose possible to manage pain    ONDANSETRON (ZOFRAN ODT) 4 MG DISINTEGRATING TABLET    Take 1 tablet by mouth every 8 hours as needed for Nausea       (Please note that portions of this note were completed with a voice recognition program.  Efforts were made to edit the dictations but occasionally words are mis-transcribed.)    Art Jiang MD,, MD   Attending Emergency Physician       Elzie Schirmer, MD  09/21/22 0480

## 2022-09-24 ENCOUNTER — HOSPITAL ENCOUNTER (EMERGENCY)
Facility: CLINIC | Age: 32
Discharge: HOME | End: 2022-09-24
Attending: EMERGENCY MEDICINE
Payer: MEDICARE

## 2022-09-24 VITALS
HEART RATE: 95 BPM | SYSTOLIC BLOOD PRESSURE: 142 MMHG | WEIGHT: 315 LBS | RESPIRATION RATE: 18 BRPM | TEMPERATURE: 98.1 F | OXYGEN SATURATION: 97 % | BODY MASS INDEX: 40.17 KG/M2 | DIASTOLIC BLOOD PRESSURE: 87 MMHG

## 2022-09-24 DIAGNOSIS — R11.2 NAUSEA VOMITING AND DIARRHEA: Primary | ICD-10-CM

## 2022-09-24 DIAGNOSIS — R51.9 ACUTE NONINTRACTABLE HEADACHE, UNSPECIFIED HEADACHE TYPE: ICD-10-CM

## 2022-09-24 DIAGNOSIS — R19.7 NAUSEA VOMITING AND DIARRHEA: Primary | ICD-10-CM

## 2022-09-24 DIAGNOSIS — E86.0 DEHYDRATION: ICD-10-CM

## 2022-09-24 LAB
ABSOLUTE EOS #: 0.2 K/UL (ref 0–0.4)
ABSOLUTE LYMPH #: 2.3 K/UL (ref 1–4.8)
ABSOLUTE MONO #: 0.6 K/UL (ref 0.1–1.2)
ALBUMIN SERPL-MCNC: 4.5 G/DL (ref 3.5–5.2)
ALBUMIN/GLOBULIN RATIO: 2 (ref 1–2.5)
ALP BLD-CCNC: 102 U/L (ref 40–129)
ALT SERPL-CCNC: 37 U/L (ref 5–41)
ANION GAP SERPL CALCULATED.3IONS-SCNC: 11 MMOL/L (ref 9–17)
AST SERPL-CCNC: 27 U/L
BASOPHILS # BLD: 1 % (ref 0–2)
BASOPHILS ABSOLUTE: 0.1 K/UL (ref 0–0.2)
BILIRUB SERPL-MCNC: 0.3 MG/DL (ref 0.3–1.2)
BILIRUBIN DIRECT: 0.1 MG/DL
BILIRUBIN, INDIRECT: 0.2 MG/DL (ref 0–1)
BUN BLDV-MCNC: 10 MG/DL (ref 6–20)
CALCIUM SERPL-MCNC: 9.2 MG/DL (ref 8.6–10.4)
CHLORIDE BLD-SCNC: 110 MMOL/L (ref 98–107)
CO2: 22 MMOL/L (ref 20–31)
CREAT SERPL-MCNC: 1 MG/DL (ref 0.7–1.2)
EOSINOPHILS RELATIVE PERCENT: 3 % (ref 1–4)
GFR AFRICAN AMERICAN: >60 ML/MIN
GFR NON-AFRICAN AMERICAN: >60 ML/MIN
GFR SERPL CREATININE-BSD FRML MDRD: ABNORMAL ML/MIN/{1.73_M2}
GLUCOSE BLD-MCNC: 100 MG/DL (ref 70–99)
HCT VFR BLD CALC: 42.1 % (ref 41–53)
HEMOGLOBIN: 14.6 G/DL (ref 13.5–17.5)
LIPASE: 63 U/L (ref 13–60)
LYMPHOCYTES # BLD: 28 % (ref 24–44)
MCH RBC QN AUTO: 30.3 PG (ref 26–34)
MCHC RBC AUTO-ENTMCNC: 34.7 G/DL (ref 31–37)
MCV RBC AUTO: 87.4 FL (ref 80–100)
MONOCYTES # BLD: 7 % (ref 2–11)
PDW BLD-RTO: 13.3 % (ref 12.5–15.4)
PLATELET # BLD: 242 K/UL (ref 140–450)
PMV BLD AUTO: 7.8 FL (ref 6–12)
POTASSIUM SERPL-SCNC: 3.8 MMOL/L (ref 3.7–5.3)
RBC # BLD: 4.81 M/UL (ref 4.5–5.9)
SEG NEUTROPHILS: 61 % (ref 36–66)
SEGMENTED NEUTROPHILS ABSOLUTE COUNT: 5.2 K/UL (ref 1.8–7.7)
SODIUM BLD-SCNC: 143 MMOL/L (ref 135–144)
TOTAL PROTEIN: 6.8 G/DL (ref 6.4–8.3)
WBC # BLD: 8.3 K/UL (ref 3.5–11)

## 2022-09-24 PROCEDURE — 96361 HYDRATE IV INFUSION ADD-ON: CPT

## 2022-09-24 PROCEDURE — 36415 COLL VENOUS BLD VENIPUNCTURE: CPT

## 2022-09-24 PROCEDURE — 99284 EMERGENCY DEPT VISIT MOD MDM: CPT

## 2022-09-24 PROCEDURE — 80076 HEPATIC FUNCTION PANEL: CPT

## 2022-09-24 PROCEDURE — 85025 COMPLETE CBC W/AUTO DIFF WBC: CPT

## 2022-09-24 PROCEDURE — 96374 THER/PROPH/DIAG INJ IV PUSH: CPT

## 2022-09-24 PROCEDURE — 80048 BASIC METABOLIC PNL TOTAL CA: CPT

## 2022-09-24 PROCEDURE — 2580000003 HC RX 258: Performed by: EMERGENCY MEDICINE

## 2022-09-24 PROCEDURE — 96375 TX/PRO/DX INJ NEW DRUG ADDON: CPT

## 2022-09-24 PROCEDURE — 83690 ASSAY OF LIPASE: CPT

## 2022-09-24 PROCEDURE — 96372 THER/PROPH/DIAG INJ SC/IM: CPT

## 2022-09-24 PROCEDURE — 6360000002 HC RX W HCPCS: Performed by: EMERGENCY MEDICINE

## 2022-09-24 RX ORDER — DEXAMETHASONE SODIUM PHOSPHATE 10 MG/ML
10 INJECTION, SOLUTION INTRAMUSCULAR; INTRAVENOUS ONCE
Status: COMPLETED | OUTPATIENT
Start: 2022-09-24 | End: 2022-09-24

## 2022-09-24 RX ORDER — DICYCLOMINE HYDROCHLORIDE 10 MG/ML
20 INJECTION INTRAMUSCULAR ONCE
Status: COMPLETED | OUTPATIENT
Start: 2022-09-24 | End: 2022-09-24

## 2022-09-24 RX ORDER — ACETAMINOPHEN 500 MG
1000 TABLET ORAL EVERY 6 HOURS PRN
Qty: 120 TABLET | Refills: 0 | Status: SHIPPED | OUTPATIENT
Start: 2022-09-24

## 2022-09-24 RX ORDER — 0.9 % SODIUM CHLORIDE 0.9 %
1000 INTRAVENOUS SOLUTION INTRAVENOUS ONCE
Status: COMPLETED | OUTPATIENT
Start: 2022-09-24 | End: 2022-09-24

## 2022-09-24 RX ORDER — ONDANSETRON 4 MG/1
4 TABLET, ORALLY DISINTEGRATING ORAL EVERY 8 HOURS PRN
Qty: 20 TABLET | Refills: 0 | Status: SHIPPED | OUTPATIENT
Start: 2022-09-24 | End: 2022-09-24 | Stop reason: SDUPTHER

## 2022-09-24 RX ORDER — DIPHENHYDRAMINE HYDROCHLORIDE 50 MG/ML
50 INJECTION INTRAMUSCULAR; INTRAVENOUS ONCE
Status: COMPLETED | OUTPATIENT
Start: 2022-09-24 | End: 2022-09-24

## 2022-09-24 RX ORDER — METOCLOPRAMIDE HYDROCHLORIDE 5 MG/ML
10 INJECTION INTRAMUSCULAR; INTRAVENOUS ONCE
Status: COMPLETED | OUTPATIENT
Start: 2022-09-24 | End: 2022-09-24

## 2022-09-24 RX ORDER — ONDANSETRON 4 MG/1
4 TABLET, ORALLY DISINTEGRATING ORAL EVERY 8 HOURS PRN
Qty: 20 TABLET | Refills: 0 | Status: SHIPPED | OUTPATIENT
Start: 2022-09-24

## 2022-09-24 RX ORDER — ORPHENADRINE CITRATE 30 MG/ML
60 INJECTION INTRAMUSCULAR; INTRAVENOUS ONCE
Status: COMPLETED | OUTPATIENT
Start: 2022-09-24 | End: 2022-09-24

## 2022-09-24 RX ADMIN — METOCLOPRAMIDE 10 MG: 5 INJECTION, SOLUTION INTRAMUSCULAR; INTRAVENOUS at 21:55

## 2022-09-24 RX ADMIN — SODIUM CHLORIDE 1000 ML: 9 INJECTION, SOLUTION INTRAVENOUS at 21:55

## 2022-09-24 RX ADMIN — DEXAMETHASONE SODIUM PHOSPHATE 10 MG: 10 INJECTION, SOLUTION INTRAMUSCULAR; INTRAVENOUS at 22:28

## 2022-09-24 RX ADMIN — ORPHENADRINE CITRATE 60 MG: 30 INJECTION INTRAMUSCULAR; INTRAVENOUS at 22:27

## 2022-09-24 RX ADMIN — DIPHENHYDRAMINE HYDROCHLORIDE 50 MG: 50 INJECTION, SOLUTION INTRAMUSCULAR; INTRAVENOUS at 22:00

## 2022-09-24 RX ADMIN — DICYCLOMINE HYDROCHLORIDE 20 MG: 20 INJECTION, SOLUTION INTRAMUSCULAR at 21:46

## 2022-09-24 ASSESSMENT — PAIN SCALES - GENERAL
PAINLEVEL_OUTOF10: 10
PAINLEVEL_OUTOF10: 5

## 2022-09-24 ASSESSMENT — PAIN DESCRIPTION - PAIN TYPE: TYPE: ACUTE PAIN

## 2022-09-24 ASSESSMENT — PAIN DESCRIPTION - FREQUENCY: FREQUENCY: CONTINUOUS

## 2022-09-24 ASSESSMENT — PAIN DESCRIPTION - LOCATION
LOCATION: HEAD

## 2022-09-24 ASSESSMENT — PAIN DESCRIPTION - ORIENTATION: ORIENTATION: LEFT;RIGHT

## 2022-09-24 ASSESSMENT — PAIN DESCRIPTION - ONSET: ONSET: ON-GOING

## 2022-09-24 ASSESSMENT — PAIN - FUNCTIONAL ASSESSMENT: PAIN_FUNCTIONAL_ASSESSMENT: 0-10

## 2022-09-24 NOTE — Clinical Note
Colletta Ratel was seen and treated in our emergency department on 9/24/2022. He may return to work on 09/26/2022. If you have any questions or concerns, please don't hesitate to call.       Cezar Mcpherson, DO

## 2022-09-24 NOTE — Clinical Note
Miriam Stallworth was seen and treated in our emergency department on 9/24/2022. He may return to work on 09/26/2022. If you have any questions or concerns, please don't hesitate to call.       Dinorah Brewster, DO

## 2022-09-25 NOTE — ED PROVIDER NOTES
[facility] ED  [address]  Phone: [phone]      Pt Name: Bisi Friend  QFK:[id number]  [place] 1990  Date of evaluation: 9/24/2022      CHIEF COMPLAINT       Chief Complaint   Patient presents with    Migraine    Emesis    Diarrhea       HISTORY OF PRESENT ILLNESS   Bisi Friend is a 33 y.o. male with history of migraine headaches, hypertension, asthma, hypothyroidism, gastric bypass surgery, tonsillectomy and adenoidectomy who presents for evaluation of a gradual onset dull achy nonradiating global headache, nausea, vomiting and diarrhea. The patient reports that starting on 9/21/2022 he developed gradual onset, constant, progressive, nausea, approximately 5 episodes of nonbilious nonbloody emesis per day, and approximately 3 episodes of nonbloody watery diarrhea per day. He was seen here in the emergency department on 9/21/2022 and had a COVID test which was negative. He was given prescriptions for Lomotil, Norco, and Zofran and discharged home. The patient states that he has been taking his medications with some improvement but he continues to have persistent symptoms. He has an appointment scheduled with his PCP in 2 days. The patient has not tried any other medications and does not list any other provoking or palliating factors. He has been able to keep down fluids intermittently throughout the day. The patient states he had an exposure to COVID last week. He is COVID vaccinated x3. The patient denies fever, chills, vision changes, neck pain, neck stiffness, back pain, chest pain, shortness of breath, abdominal pain, urinary symptoms, hematochezia, melena, focal weakness, numbness, tingling, dizziness, lightheadedness, syncope, or recent injury.     REVIEW OF SYSTEMS     Ten point review of systems was reviewed and is negative unless otherwise noted in the HPI    [address]    has a past medical history of Anxiety, Asthma, Bipolar disorder today). levothyroxine (SYNTHROID) 50 MCG tablet   75 mcg daily              ALLERGIES     is allergic to chantix [varenicline tartrate], ciprofloxacin, nsaids, pcn [penicillins], tessalon [benzonatate], and tolmetin. FAMILY HISTORY     is adopted. family history is not on file. He was adopted. SOCIAL HISTORY      reports that he has been smoking cigarettes. He has been smoking an average of .5 packs per day. He has never used smokeless tobacco. He reports current alcohol use. He reports that he does not use drugs. PHYSICAL EXAM     INITIAL VITALS:  weight is 149.7 kg (330 lb) (abnormal). His oral temperature is 98.1 °F (36.7 °C). His blood pressure is 142/87 (abnormal) and his pulse is 95. His respiration is 18 and oxygen saturation is 97%. CONSTITUTIONAL: no apparent distress, well appearing  SKIN: warm, dry, no jaundice, hives or petechiae  EYES: clear conjunctiva, non-icteric sclera, pupils 3 mm, equal, round reactive to light, extraocular movements intact  HENT: normocephalic, atraumatic, dry mucus membranes. Posterior oropharynx is clear without any erythema, edema, exudate or asymmetry. Uvula midline. No trismus or malocclusion. No pain to palpation over the frontal or maxillary sinuses. No mastoid tenderness. Able to handle secretions. Normal speech. Patent airway. No submental swelling or evidence of cellulitis. NECK: Nontender and supple with no nuchal rigidity, full range of motion  PULMONARY: clear to auscultation without wheezes, rhonchi, or rales, normal excursion, no accessory muscle use and no stridor  CARDIOVASCULAR: regular rate, rhythm. Strong radial pulses with intact distal perfusion. Capillary refill <2 seconds. GASTROINTESTINAL: soft, non-tender, non-distended, no palpable masses, no rebound or guarding   GENITOURINARY: No costovertebral angle tenderness to palpation  MUSCULOSKELETAL: No midline spinal tenderness, step off or deformity.  Extremities are otherwise nontender to palpation and nonerythematous. Compartments soft. No peripheral edema. NEUROLOGIC: alert and oriented x 3, GCS 15, normal mentation and speech. Moves all extremities x 4 without motor or sensory deficit, gait is stable without ataxia. Cranial nerves II through XII intact. No cerebellar signs. No pronator drift. Normal finger-to-nose. PSYCHIATRIC: normal mood and affect, thought process is clear and linear    DIAGNOSTIC RESULTS     EKG:  None    RADIOLOGY:   No results found.     LABS:  Results for orders placed or performed during the hospital encounter of 09/24/22   CBC with Auto Differential   Result Value Ref Range    WBC 8.3 3.5 - 11.0 k/uL    RBC 4.81 4.5 - 5.9 m/uL    Hemoglobin 14.6 13.5 - 17.5 g/dL    Hematocrit 42.1 41 - 53 %    MCV 87.4 80 - 100 fL    MCH 30.3 26 - 34 pg    MCHC 34.7 31 - 37 g/dL    RDW 13.3 12.5 - 15.4 %    Platelets 901 959 - 908 k/uL    MPV 7.8 6.0 - 12.0 fL    Seg Neutrophils 61 36 - 66 %    Lymphocytes 28 24 - 44 %    Monocytes 7 2 - 11 %    Eosinophils % 3 1 - 4 %    Basophils 1 0 - 2 %    Segs Absolute 5.20 1.8 - 7.7 k/uL    Absolute Lymph # 2.30 1.0 - 4.8 k/uL    Absolute Mono # 0.60 0.1 - 1.2 k/uL    Absolute Eos # 0.20 0.0 - 0.4 k/uL    Basophils Absolute 0.10 0.0 - 0.2 k/uL   BMP   Result Value Ref Range    Glucose 100 (H) 70 - 99 mg/dL    BUN 10 6 - 20 mg/dL    Creatinine 1.00 0.70 - 1.20 mg/dL    Calcium 9.2 8.6 - 10.4 mg/dL    Sodium 143 135 - 144 mmol/L    Potassium 3.8 3.7 - 5.3 mmol/L    Chloride 110 (H) 98 - 107 mmol/L    CO2 22 20 - 31 mmol/L    Anion Gap 11 9 - 17 mmol/L    GFR Non-African American >60 >60 mL/min    GFR African American >60 >60 mL/min    GFR Comment         Hepatic Function Panel   Result Value Ref Range    Albumin 4.5 3.5 - 5.2 g/dL    Alkaline Phosphatase 102 40 - 129 U/L    ALT 37 5 - 41 U/L    AST 27 <40 U/L    Total Bilirubin 0.3 0.3 - 1.2 mg/dL    Bilirubin, Direct 0.1 <0.31 mg/dL    Bilirubin, Indirect 0.2 0.00 - 1.00 mg/dL Total Protein 6.8 6.4 - 8.3 g/dL    Albumin/Globulin Ratio 2.0 1.0 - 2.5   Lipase   Result Value Ref Range    Lipase 63 (H) 13 - 60 U/L       EMERGENCY DEPARTMENT COURSE:        The patient was given the following medications:  Orders Placed This Encounter   Medications    0.9 % sodium chloride bolus    dicyclomine (BENTYL) injection 20 mg    metoclopramide (REGLAN) injection 10 mg    diphenhydrAMINE (BENADRYL) injection 50 mg    dexamethasone (PF) (DECADRON) injection 10 mg    orphenadrine (NORFLEX) injection 60 mg    DISCONTD: ondansetron (ZOFRAN ODT) 4 MG disintegrating tablet     Sig: Take 1 tablet by mouth every 8 hours as needed for Nausea     Dispense:  20 tablet     Refill:  0    acetaminophen (TYLENOL) 500 MG tablet     Sig: Take 2 tablets by mouth every 6 hours as needed for Pain     Dispense:  120 tablet     Refill:  0    ondansetron (ZOFRAN ODT) 4 MG disintegrating tablet     Sig: Take 1 tablet by mouth every 8 hours as needed for Nausea     Dispense:  20 tablet     Refill:  0        Vitals:    Vitals:    09/24/22 2126 09/24/22 2130   BP: (!) 142/87    Pulse: 95    Resp: 18    Temp: 98.1 °F (36.7 °C)    TempSrc: Oral    SpO2: 97%    Weight:  (!) 149.7 kg (330 lb)     -------------------------  BP: (!) 142/87, Temp: 98.1 °F (36.7 °C), Heart Rate: 95, Resp: 18    CONSULTS:  None    CRITICAL CARE:   None    PROCEDURES:  None    DIAGNOSIS/ MDM:   Benita Villalba is a 33 y.o. male who presents with nausea, vomiting, diarrhea and headache. He states that his headache is typical of his migraines. Vital signs are stable. Heart regular rate and rhythm. Lungs clear to auscultation. Abdomen soft nontender. Posterior oropharynx is clear. He does have dry mucous membranes. He was treated with IV fluids, Benadryl, Reglan, Bentyl, Norflex and Decadron with improvement in all of his symptoms. CBC, BMP, LFTs, and lipase are unremarkable. He had a negative COVID 3 days ago.   I suspect his symptoms are secondary to a viral URI which is triggering a tension versus migraine headache. I have low suspicion for sepsis, intracranial hemorrhage, meningitis, acute abdomen, perforation or obstruction. I instructed the patient to take Tylenol as needed for pain and to continue to take Zofran as needed for nausea. He was instructed to follow-up with his PCP in 2 days as scheduled and to return to the ER for worsening symptoms or any other concern. The patient understands that at this time there is no evidence for a more malignant underlying process, but also understands that early in the process of an illness or injury, an emergency department work-up can be falsely reassuring. Routine discharge counseling was given, and the patient understands that worsening, changing or persistent symptoms should prompt a immediate call or follow-up with their primary care physician or return to the emergency department. The importance of appropriate follow-up was also discussed. I have reviewed the disposition diagnosis with the patient. I have answered their questions and given discharge instructions. They voiced understanding of these instructions and did not have any further questions or complaints. FINAL IMPRESSION      1. Nausea vomiting and diarrhea    2. Acute nonintractable headache, unspecified headache type    3.  Dehydration          DISPOSITION/PLAN   DISPOSITION Decision To Discharge 09/24/2022 10:22:32 PM        PATIENT REFERRED TO:  Your new PCP    Go in 2 days  as scheduled    DISCHARGE MEDICATIONS:  Discharge Medication List as of 9/24/2022 10:33 PM          (Please note that portions of this note were completed with a voice recognitionprogram.  Efforts were made to edit the dictations but occasionally words are mis-transcribed.)    Caden Sparks DO, [facility]  Emergency Physician Attending          Caden Sparks DO  09/25/22 0317

## 2022-09-25 NOTE — ED NOTES
Pt. Ambulatory to room #12 from waiting area, gait steady. Pt. C/o headache, diarrhea, chills, and emesis every since he was seen in our ed 9/21/22. Pt. States he had 4 to 5 episodes of emesis today. Pt. Had covid test done in our ED on 9/21/22 that was negative. Pt. States a family member did test positive for covid. Pt. Alert and oriented x4. RR equal and non labored. NaD noted. Call light within reach.       Navi Valiente RN  09/24/22 0994

## 2022-12-02 ENCOUNTER — HOSPITAL ENCOUNTER (EMERGENCY)
Facility: CLINIC | Age: 32
Discharge: HOME | End: 2022-12-02
Attending: EMERGENCY MEDICINE
Payer: MEDICARE

## 2022-12-02 ENCOUNTER — APPOINTMENT (OUTPATIENT)
Dept: CT IMAGING | Facility: CLINIC | Age: 32
End: 2022-12-02
Payer: MEDICARE

## 2022-12-02 VITALS
OXYGEN SATURATION: 95 % | BODY MASS INDEX: 38.36 KG/M2 | RESPIRATION RATE: 16 BRPM | HEART RATE: 94 BPM | SYSTOLIC BLOOD PRESSURE: 156 MMHG | WEIGHT: 315 LBS | HEIGHT: 76 IN | TEMPERATURE: 97.7 F | DIASTOLIC BLOOD PRESSURE: 85 MMHG
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
## 2022-12-02 DIAGNOSIS — S09.90XA CLOSED HEAD INJURY, INITIAL ENCOUNTER: Primary | ICD-10-CM

## 2022-12-02 PROCEDURE — 70486 CT MAXILLOFACIAL W/O DYE: CPT

## 2022-12-02 PROCEDURE — 72125 CT NECK SPINE W/O DYE: CPT

## 2022-12-02 PROCEDURE — 99284 EMERGENCY DEPT VISIT MOD MDM: CPT

## 2022-12-02 PROCEDURE — 70450 CT HEAD/BRAIN W/O DYE: CPT

## 2022-12-02 PROCEDURE — 6370000000 HC RX 637 (ALT 250 FOR IP): Performed by: REGISTERED NURSE

## 2022-12-02 RX ORDER — CLINDAMYCIN HYDROCHLORIDE 150 MG/1
450 CAPSULE ORAL 3 TIMES DAILY
Qty: 63 CAPSULE | Refills: 0 | Status: SHIPPED | OUTPATIENT
Start: 2022-12-02 | End: 2022-12-09

## 2022-12-02 RX ORDER — ONDANSETRON 4 MG/1
4 TABLET, ORALLY DISINTEGRATING ORAL ONCE
Status: COMPLETED | OUTPATIENT
Start: 2022-12-02 | End: 2022-12-02

## 2022-12-02 RX ORDER — DOXYCYCLINE HYCLATE 100 MG
100 TABLET ORAL 2 TIMES DAILY
Qty: 14 TABLET | Refills: 0 | Status: SHIPPED | OUTPATIENT
Start: 2022-12-02 | End: 2022-12-09

## 2022-12-02 RX ORDER — ONDANSETRON 4 MG/1
4 TABLET, ORALLY DISINTEGRATING ORAL 3 TIMES DAILY PRN
Qty: 21 TABLET | Refills: 0 | Status: SHIPPED | OUTPATIENT
Start: 2022-12-02

## 2022-12-02 RX ADMIN — ONDANSETRON 4 MG: 4 TABLET, ORALLY DISINTEGRATING ORAL at 15:11

## 2022-12-02 ASSESSMENT — PAIN DESCRIPTION - ONSET: ONSET: ON-GOING

## 2022-12-02 ASSESSMENT — ENCOUNTER SYMPTOMS
DIARRHEA: 0
NAUSEA: 1
SHORTNESS OF BREATH: 0
COUGH: 0
COLOR CHANGE: 1
VOMITING: 0
BACK PAIN: 0
ABDOMINAL PAIN: 0
PHOTOPHOBIA: 1

## 2022-12-02 ASSESSMENT — PAIN - FUNCTIONAL ASSESSMENT
PAIN_FUNCTIONAL_ASSESSMENT: ACTIVITIES ARE NOT PREVENTED
PAIN_FUNCTIONAL_ASSESSMENT: 0-10

## 2022-12-02 ASSESSMENT — PAIN DESCRIPTION - FREQUENCY: FREQUENCY: CONTINUOUS

## 2022-12-02 ASSESSMENT — PAIN DESCRIPTION - LOCATION: LOCATION: HEAD;NECK

## 2022-12-02 ASSESSMENT — PAIN SCALES - GENERAL: PAINLEVEL_OUTOF10: 8

## 2022-12-02 ASSESSMENT — PAIN DESCRIPTION - DESCRIPTORS: DESCRIPTORS: ACHING

## 2022-12-02 ASSESSMENT — PAIN DESCRIPTION - PAIN TYPE: TYPE: ACUTE PAIN
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
## 2022-12-02 NOTE — DISCHARGE INSTRUCTIONS
PLEASE RETURN TO THE EMERGENCY DEPARTMENT IMMEDIATELY if your symptoms worsen in anyway or in 8-12 hours if not improved for re-evaluation. You should immediately return to the ER for symptoms such as new or worsening pain, fever, visual or hearing changes, stiff neck, rash, a feeling of passing out, chest pain, shortness of breath, persistent nausea and/or vomiting, numbness or weakness to the arms or legs, coolness or color change of the arms or legs. You should avoid contact sports or activities where you might hit your head for a minimum of 1 week. Take your medication as indicated and prescribed. If you are given an antibiotic then, make sure you get the prescription filled and take the antibiotics until finished. Please understand that at this time there is no evidence for a more serious underlying process, but that early in the process of an illness or injury, an emergency department workup can be falsely reassuring. You should contact your family doctor within the next 24 hours for a follow up appointment    Narinder Lomas!!!    From [facility]) and [facility] Services    On behalf of the Emergency Department staff at [facility]), I would like to thank you for giving us the opportunity to address your health care needs and concerns. We hope that during your visit, our service was delivered in a professional and caring manner. Please keep [facility]) in mind as we walk with you down the path to your own personal wellness. Please expect an automated text message or email from us so we can ask a few questions about your health and progress. Based on your answers, a clinician may call you back to offer help and instructions. Please understand that early in the process of an illness or injury, an emergency department workup can be falsely reassuring. If you notice any worsening, changing or persistent symptoms please call your family doctor or return to the ER immediately.      Tell us how we did during your visit at [email or web address]   and let us know about your experience

## 2023-02-01 ENCOUNTER — HOSPITAL ENCOUNTER (EMERGENCY)
Facility: CLINIC | Age: 33
Discharge: HOME | End: 2023-02-01
Attending: EMERGENCY MEDICINE
Payer: MEDICAID

## 2023-02-01 VITALS
RESPIRATION RATE: 18 BRPM | BODY MASS INDEX: 38.36 KG/M2 | DIASTOLIC BLOOD PRESSURE: 97 MMHG | HEART RATE: 92 BPM | WEIGHT: 315 LBS | SYSTOLIC BLOOD PRESSURE: 157 MMHG | HEIGHT: 76 IN | OXYGEN SATURATION: 97 % | TEMPERATURE: 98.5 F

## 2023-02-01 DIAGNOSIS — S02.5XXA CLOSED FRACTURE OF TOOTH, INITIAL ENCOUNTER: ICD-10-CM

## 2023-02-01 DIAGNOSIS — K04.7 DENTAL INFECTION: Primary | ICD-10-CM

## 2023-02-01 PROCEDURE — 96372 THER/PROPH/DIAG INJ SC/IM: CPT

## 2023-02-01 PROCEDURE — 6360000002 HC RX W HCPCS: Performed by: EMERGENCY MEDICINE

## 2023-02-01 PROCEDURE — 99284 EMERGENCY DEPT VISIT MOD MDM: CPT

## 2023-02-01 RX ORDER — CLINDAMYCIN HYDROCHLORIDE 300 MG/1
300 CAPSULE ORAL 3 TIMES DAILY
Qty: 21 CAPSULE | Refills: 0 | Status: SHIPPED | OUTPATIENT
Start: 2023-02-01 | End: 2023-02-08

## 2023-02-01 RX ORDER — KETOROLAC TROMETHAMINE 15 MG/ML
15 INJECTION, SOLUTION INTRAMUSCULAR; INTRAVENOUS ONCE
Status: COMPLETED | OUTPATIENT
Start: 2023-02-01 | End: 2023-02-01

## 2023-02-01 RX ORDER — CHLORHEXIDINE GLUCONATE 0.12 MG/ML
15 RINSE ORAL 2 TIMES DAILY
Qty: 420 ML | Refills: 0 | Status: SHIPPED | OUTPATIENT
Start: 2023-02-01 | End: 2023-02-15

## 2023-02-01 RX ADMIN — KETOROLAC TROMETHAMINE 15 MG: 15 INJECTION, SOLUTION INTRAMUSCULAR; INTRAVENOUS at 17:12

## 2023-02-01 ASSESSMENT — PAIN DESCRIPTION - FREQUENCY: FREQUENCY: CONTINUOUS

## 2023-02-01 ASSESSMENT — PAIN - FUNCTIONAL ASSESSMENT
PAIN_FUNCTIONAL_ASSESSMENT: 0-10
PAIN_FUNCTIONAL_ASSESSMENT: ACTIVITIES ARE NOT PREVENTED

## 2023-02-01 ASSESSMENT — PAIN DESCRIPTION - ORIENTATION: ORIENTATION: LOWER

## 2023-02-01 ASSESSMENT — PAIN DESCRIPTION - LOCATION: LOCATION: JAW;TEETH;MOUTH

## 2023-02-01 ASSESSMENT — PAIN SCALES - GENERAL: PAINLEVEL_OUTOF10: 10

## 2023-02-01 ASSESSMENT — PAIN DESCRIPTION - ONSET: ONSET: ON-GOING

## 2023-02-01 ASSESSMENT — PAIN DESCRIPTION - PAIN TYPE: TYPE: ACUTE PAIN

## 2023-02-01 ASSESSMENT — ENCOUNTER SYMPTOMS: ABDOMINAL PAIN: 0

## 2023-02-01 ASSESSMENT — PAIN DESCRIPTION - DESCRIPTORS: DESCRIPTORS: ACHING

## 2023-02-01 NOTE — ED PROVIDER NOTES
[facility]  [address]  Phone: [facility] ED  EMERGENCY DEPARTMENT ENCOUNTER      Pt Name: Cesario De La Rosa  MRN: [id number]  [place] 1990  Date of evaluation: 2/1/2023  Provider: Adeola Reveles DO    CHIEF COMPLAINT       Chief Complaint   Patient presents with    Dental Pain         HISTORY OF PRESENT ILLNESS   (Location/Symptom, Timing/Onset,Context/Setting, Quality, Duration, Modifying Factors, Severity)  Note limiting factors. Cesario De La Rosa is a 33 y.o. male who presents to the emergency department for the evaluation of dental pain. This has been an ongoing issue with a lower tooth and patient did see [facility] and they referred him to an oral surgeon. He has an appointment in 3 weeks. States he was on an antibiotic a while ago but the pain has come back. He cannot take over-the-counter nonsteroidals because he has history of gastric bypass. He states chlorhexidine helped in the past.  No difficulty breathing or swallowing. Presents for treatment of the pain    Nursing Notes were reviewed. REVIEW OF SYSTEMS    (2-9systems for level 4, 10 or more for level 5)     Review of Systems   Constitutional:  Negative for fever. HENT:  Positive for dental problem. Gastrointestinal:  Negative for abdominal pain. Except asnoted above the remainder of the review of systems was reviewed and negative.        PAST MEDICAL HISTORY     Past Medical History:   Diagnosis Date    Anxiety     Asthma     Bipolar disorder ([address]     Depression     GERD (gastroesophageal reflux disease)     Headache     Hypertension     Hypothyroidism     Insomnia     Unspecified sleep apnea          SURGICAL HISTORY       Past Surgical History:   Procedure Laterality Date    FINGER TRIGGER RELEASE      GASTRIC BYPASS SURGERY      TONSILLECTOMY      UPPER GASTROINTESTINAL ENDOSCOPY      [place] TOOTH EXTRACTION           CURRENT MEDICATIONS Previous Medications    ACETAMINOPHEN (TYLENOL) 500 MG TABLET    Take 2 tablets by mouth every 6 hours as needed for Pain    ALPRAZOLAM (XANAX) 0.5 MG TABLET    Take 1 mg by mouth nightly as needed for Sleep     AMLODIPINE (NORVASC) 5 MG TABLET      Take 10 mg by mouth daily     CARVEDILOL (COREG) 6.25 MG TABLET    Take 6.25 mg by mouth 2 times daily (with meals)    DEXTROMETHORPHAN-GUAIFENESIN (MUCINEX DM)  MG TB12    Take 1 tablet by mouth 2 times daily    FEXOFENADINE (ALLEGRA) 180 MG TABLET    Take 180 mg by mouth daily    FLUOXETINE (PROZAC) 40 MG CAPSULE    Total of 60 mg daily    LEVOTHYROXINE (SYNTHROID) 50 MCG TABLET      75 mcg daily     MULTIPLE VITAMINS-MINERALS (CENTROVITE) TABS        ONDANSETRON (ZOFRAN-ODT) 4 MG DISINTEGRATING TABLET    Take 1 tablet by mouth 3 times daily as needed for Nausea or Vomiting    PANTOPRAZOLE (PROTONIX) 20 MG TABLET    Take 1 tablet by mouth daily    PROMETHAZINE (PHENERGAN) 12.5 MG TABLET    Take 12.5 mg by mouth every 6 hours as needed for Nausea    TIZANIDINE (ZANAFLEX) 4 MG TABLET    Take 1 tablet by mouth 3 times daily as needed (pain)    VENTOLIN  (90 BASE) MCG/ACT INHALER    Inhale 2 puffs into the lungs every 4 hours as needed for Wheezing or Shortness of Breath (none today).        ALLERGIES     Chantix [varenicline tartrate], Ciprofloxacin, Nsaids, Pcn [penicillins], Tessalon [benzonatate], and Tolmetin    FAMILY HISTORY       Family History   Adopted: Yes          SOCIAL HISTORY       Social History     Socioeconomic History    Marital status: Single     Spouse name: None    Number of children: None    Years of education: None    Highest education level: None   Tobacco Use    Smoking status: Every Day     Packs/day: 0.50     Types: Cigarettes    Smokeless tobacco: Never   Vaping Use    Vaping Use: Former    Substances: Nicotine   Substance and Sexual Activity    Alcohol use: Yes     Comment: rare;y    Drug use: No    Sexual activity: Yes Partners: Male       SCREENINGS    Brandon Coma Scale  Eye Opening: Spontaneous  Best Verbal Response: Oriented  Best Motor Response: Obeys commands  Brandon Coma Scale Score: 15        PHYSICAL EXAM    (up to 7 for level 4, 8 or more for level 5)     ED Triage Vitals [02/01/23 1655]   BP Temp Temp Source Heart Rate Resp SpO2 Height Weight   (!) 157/97 98.5 °F (36.9 °C) Oral 92 18 97 % 6' 4\" (1.93 m) (!) 340 lb (154.2 kg)       Physical Exam  Vitals and nursing note reviewed. Constitutional:       General: He is not in acute distress. Appearance: He is not ill-appearing or toxic-appearing. HENT:      Head: Normocephalic and atraumatic. Mouth/Throat:      Comments: Overall poor dentition with a fairly significant amount of dental work done in the past.  Tooth #2 3 appears to be rotted and likely needs extracted. Eyes:      Conjunctiva/sclera: Conjunctivae normal.   Cardiovascular:      Rate and Rhythm: Normal rate. Pulses: Normal pulses. Pulmonary:      Effort: Pulmonary effort is normal. No respiratory distress. Breath sounds: No wheezing. Abdominal:      General: There is no distension. Musculoskeletal:         General: No deformity or signs of injury. Skin:     General: Skin is warm and dry. Neurological:      General: No focal deficit present. Mental Status: He is alert. Gait: Gait normal.      Comments: No Facial asymmetry, speaking normal.        EMERGENCY DEPARTMENT COURSE and DIFFERENTIAL DIAGNOSIS/MDM:   Vitals:    Vitals:    02/01/23 1655   BP: (!) 157/97   Pulse: 92   Resp: 18   Temp: 98.5 °F (36.9 °C)   TempSrc: Oral   SpO2: 97%   Weight: (!) 154.2 kg (340 lb)   Height: 6' 4\" (1.93 m)       Patient presents to the emergency department with a complaint described above. Vitals grossly normal, patient nontoxic, resting comfortably, no distress.   Suspect most likely has an infection in this tooth and I am going to treat with antibiotic and chlorhexidine and I have advised he keep his dental follow-up. Have talked about what to return to ER for. At this time the patient is without objective evidence of an acute process requiring hospitalization or inpatient management. They have remained hemodynamically stable and are stable for discharge with outpatient follow-up. Standard anticipatory guidance given to patient upon discharge. Have given them a specific time frame in which to follow-up and who to follow-up with. I have also advised them that they should return to the emergency department if they get worse, or not getting better or develop any new or concerning symptoms. Patient demonstrates understanding. PROCEDURES:  Unless otherwise noted below, none     Procedures    FINAL IMPRESSION      1. Dental infection    2.  Closed fracture of tooth, initial encounter          DISPOSITION/PLAN   DISPOSITION Decision To Discharge 02/01/2023 05:00:00 PM      PATIENT REFERRED TO:  Your dentist  Try calling [phone] speak with the [address] [place] [address] if you are having trouble getting into your own dentist or you need a Dentist.        DISCHARGE MEDICATIONS:  New Prescriptions    CHLORHEXIDINE (PERIDEX) 0.12 % SOLUTION    Swish and spit 15 mLs 2 times daily for 14 days    CLINDAMYCIN (CLEOCIN) 300 MG CAPSULE    Take 1 capsule by mouth 3 times daily for 7 days          (Please note that portions of this note were completed with a voice recognition program.  Efforts were made to edit the dictations but occasionally words are mis-transcribed.)    Kathia Waters DO,(electronically signed)  Board Certified Emergency Physician          Kathia Waters DO  02/01/23 7278

## 2023-02-01 NOTE — ED NOTES
Pt presents to ED c/o lower dental pain. Pt states he has a cracked tooth for which he as an appointment scheduled with Dr. Narvaez, his oral surgeon. Pt states his appointment is not until the 21st and he cannot take the pain until then. Pt states he has tried multiple OTC and prescription medications with no relief of symptoms. Pt arrives A/Ox4, PWD, PMS intact. Pt resting on stretcher with call light in reach.     Geovany Joyner RN  02/01/23 4077

## 2023-02-18 ENCOUNTER — HOSPITAL ENCOUNTER (EMERGENCY)
Facility: CLINIC | Age: 33
Discharge: HOME | End: 2023-02-18
Attending: SPECIALIST
Payer: MEDICAID

## 2023-02-18 VITALS
HEART RATE: 89 BPM | TEMPERATURE: 97.8 F | OXYGEN SATURATION: 98 % | SYSTOLIC BLOOD PRESSURE: 134 MMHG | DIASTOLIC BLOOD PRESSURE: 86 MMHG | RESPIRATION RATE: 14 BRPM

## 2023-02-18 DIAGNOSIS — K08.409 S/P TOOTH EXTRACTION: ICD-10-CM

## 2023-02-18 DIAGNOSIS — K08.89 PAIN, DENTAL: Primary | ICD-10-CM

## 2023-02-18 PROCEDURE — 99283 EMERGENCY DEPT VISIT LOW MDM: CPT

## 2023-02-18 PROCEDURE — 6370000000 HC RX 637 (ALT 250 FOR IP): Performed by: SPECIALIST

## 2023-02-18 RX ORDER — CLINDAMYCIN HYDROCHLORIDE 150 MG/1
300 CAPSULE ORAL ONCE
Status: COMPLETED | OUTPATIENT
Start: 2023-02-18 | End: 2023-02-18

## 2023-02-18 RX ORDER — MONTELUKAST SODIUM 10 MG/1
TABLET ORAL
COMMUNITY
Start: 2023-01-05

## 2023-02-18 RX ORDER — ONDANSETRON 4 MG/1
4 TABLET, ORALLY DISINTEGRATING ORAL 3 TIMES DAILY PRN
Qty: 21 TABLET | Refills: 0 | Status: SHIPPED | OUTPATIENT
Start: 2023-02-18

## 2023-02-18 RX ORDER — ONDANSETRON 4 MG/1
4 TABLET, ORALLY DISINTEGRATING ORAL ONCE
Status: COMPLETED | OUTPATIENT
Start: 2023-02-18 | End: 2023-02-18

## 2023-02-18 RX ORDER — GABAPENTIN 100 MG/1
CAPSULE ORAL
COMMUNITY
Start: 2023-01-22

## 2023-02-18 RX ORDER — EMTRICITABINE AND TENOFOVIR ALAFENAMIDE 200; 25 MG/1; MG/1
1 TABLET ORAL DAILY
COMMUNITY
Start: 2023-01-30

## 2023-02-18 RX ORDER — ACETAMINOPHEN 500 MG
1000 TABLET ORAL 3 TIMES DAILY PRN
Qty: 60 TABLET | Refills: 0 | Status: SHIPPED | OUTPATIENT
Start: 2023-02-18

## 2023-02-18 RX ORDER — CLINDAMYCIN HYDROCHLORIDE 300 MG/1
300 CAPSULE ORAL 3 TIMES DAILY
Qty: 21 CAPSULE | Refills: 0 | Status: SHIPPED | OUTPATIENT
Start: 2023-02-18 | End: 2023-02-25

## 2023-02-18 RX ORDER — CHLORHEXIDINE GLUCONATE 0.12 MG/ML
15 RINSE ORAL 2 TIMES DAILY
Qty: 420 ML | Refills: 0 | Status: SHIPPED | OUTPATIENT
Start: 2023-02-18 | End: 2023-03-04

## 2023-02-18 RX ADMIN — CLINDAMYCIN HYDROCHLORIDE 300 MG: 150 CAPSULE ORAL at 05:51

## 2023-02-18 RX ADMIN — ONDANSETRON 4 MG: 4 TABLET, ORALLY DISINTEGRATING ORAL at 05:51

## 2023-02-18 ASSESSMENT — ENCOUNTER SYMPTOMS
SORE THROAT: 0
COUGH: 0
SHORTNESS OF BREATH: 0

## 2023-02-18 ASSESSMENT — PAIN SCALES - GENERAL: PAINLEVEL_OUTOF10: 9

## 2023-02-18 ASSESSMENT — PAIN - FUNCTIONAL ASSESSMENT: PAIN_FUNCTIONAL_ASSESSMENT: 0-10

## 2023-02-18 ASSESSMENT — LIFESTYLE VARIABLES
HOW OFTEN DO YOU HAVE A DRINK CONTAINING ALCOHOL: PATIENT DECLINED
HOW MANY STANDARD DRINKS CONTAINING ALCOHOL DO YOU HAVE ON A TYPICAL DAY: PATIENT DECLINED

## 2023-02-18 ASSESSMENT — PAIN DESCRIPTION - PAIN TYPE: TYPE: ACUTE PAIN

## 2023-02-18 ASSESSMENT — PAIN DESCRIPTION - FREQUENCY: FREQUENCY: CONTINUOUS

## 2023-02-18 ASSESSMENT — PAIN DESCRIPTION - ORIENTATION: ORIENTATION: LEFT;LOWER

## 2023-02-18 NOTE — DISCHARGE INSTRUCTIONS
Please understand that at this time there is no evidence for a more serious underlying process, but that early in the process of an illness or injury, an emergency department workup can be falsely reassuring. You should contact your family doctor within the next 48 hours for a follow up appointment    Narinder Lomas!!!    From [facility] and [facility] Emergency Services    On behalf of the Emergency Department staff at [facility]), I would like to thank you for giving us the opportunity to address your health care needs and concerns. We hope that during your visit, our service was delivered in a professional and caring manner. Please keep [facility] ([facility]) in mind as we walk with you down the path to your own personal wellness. Please expect an automated text message or email from us so we can ask a few questions about your health and progress. Based on your answers, a clinician may call you back to offer help and instructions. Please understand that early in the process of an illness or injury, an emergency department workup can be falsely reassuring. If you notice any worsening, changing or persistent symptoms please call your family doctor or return to the ER immediately. Tell us how we did during your visit at [email or web address]   and let us know about your experience

## 2023-02-18 NOTE — ED TRIAGE NOTES
To ED room 10 per self. C/o right lower dental pain. States tooth was extracted 2 days ago. States woke up 15 min ago with \"a mouth full of blood. \"  States he has had nausea and vomiting as a result of this pain.

## 2023-02-18 NOTE — ED PROVIDER NOTES
[facility]  [address] [facility]  Phone: [phone]      Pt Name: Claudia Holman  MRN: [id number]  [place] 1990  Date of evaluation: 2/18/2023      CHIEF COMPLAINT       Chief Complaint   Patient presents with    Dental Pain         HISTORY OF PRESENT ILLNESS    Claudia Holman is a 33 y.o. male who presents   Chief Complaint   Patient presents with    Dental Pain   . 71-year-old male patient presents to the emergency department complaining of pain in the right lower jaw from the area where he had tooth extraction 2 days ago. Patient has been having nausea and vomiting and the pain woke him up and patient noticed blood in the socket as well as vomited. He called the on-call oral surgeon who asked him to come to the emergency department. The bleeding has stopped completely prior to arrival.  Patient does not take any anticoagulants or antiplatelet agents. He has been using Peridex mouthwash and Tylenol for the pain. He is not on any antibiotics currently. He denies any sore throat, fever or chills. He denies any shortness of breath, chest pain, shortness of breath, palpitations or diaphoresis. REVIEW OF SYSTEMS     Review of Systems   Constitutional:  Negative for chills, diaphoresis and fever. HENT:  Positive for dental problem. Negative for sore throat. Respiratory:  Negative for cough and shortness of breath. Neurological:  Negative for dizziness, light-headedness and headaches. All other systems reviewed and are negative. PAST MEDICAL HISTORY    has a past medical history of Anxiety, Asthma, Bipolar disorder ([address] Depression, GERD (gastroesophageal reflux disease), Headache, Hypertension, Hypothyroidism, Insomnia, and Unspecified sleep apnea. SURGICAL HISTORY      has a past surgical history that includes [place] tooth extraction; Upper gastrointestinal endoscopy; Finger trigger release; Tonsillectomy; and Gastric bypass surgery.     CURRENT MEDICATIONS       Previous Medications    ACETAMINOPHEN (TYLENOL) 500 MG TABLET    Take 2 tablets by mouth every 6 hours as needed for Pain    ALPRAZOLAM (XANAX) 0.5 MG TABLET    Take 1 mg by mouth nightly as needed for Sleep     AMLODIPINE (NORVASC) 5 MG TABLET      Take 10 mg by mouth daily     CARIPRAZINE HCL (VRAYLAR) 4.5 MG CAPS CAPSULE    Take 4.5 mg by mouth daily    CARVEDILOL (COREG) 6.25 MG TABLET    Take 6.25 mg by mouth 2 times daily (with meals)    CLONAZEPAM (KLONOPIN PO)    Take 0.75 mg by mouth    DEXTROMETHORPHAN-GUAIFENESIN (MUCINEX DM)  MG TB12    Take 1 tablet by mouth 2 times daily    EMTRICITABINE-TENOFOVIR ALAFENAMIDE (DESCOVY) 200-25 MG TABS TABLET    Take 1 tablet by mouth daily    FEXOFENADINE (ALLEGRA) 180 MG TABLET    Take 180 mg by mouth daily    FLUOXETINE (PROZAC) 40 MG CAPSULE    Total of 60 mg daily    GABAPENTIN (NEURONTIN) 100 MG CAPSULE    TAKE ONE CAPSULE BY MOUTH THREE TIMES A DAY    LEVOTHYROXINE (SYNTHROID) 50 MCG TABLET      75 mcg daily     MONTELUKAST (SINGULAIR) 10 MG TABLET    TAKE ONE TABLET BY MOUTH EVERY NIGHT    MULTIPLE VITAMINS-MINERALS (CENTROVITE) TABS        ONDANSETRON (ZOFRAN-ODT) 4 MG DISINTEGRATING TABLET    Take 1 tablet by mouth 3 times daily as needed for Nausea or Vomiting    PANTOPRAZOLE (PROTONIX) 20 MG TABLET    Take 1 tablet by mouth daily    PROMETHAZINE (PHENERGAN) 12.5 MG TABLET    Take 12.5 mg by mouth every 6 hours as needed for Nausea    TIZANIDINE (ZANAFLEX) 4 MG TABLET    Take 1 tablet by mouth 3 times daily as needed (pain)    VENTOLIN  (90 BASE) MCG/ACT INHALER    Inhale 2 puffs into the lungs every 4 hours as needed for Wheezing or Shortness of Breath (none today). ALLERGIES     is allergic to chantix [varenicline tartrate], ciprofloxacin, nsaids, pcn [penicillins], tessalon [benzonatate], and tolmetin. FAMILY HISTORY     is adopted. family history is not on file. He was adopted.     SOCIAL HISTORY      reports that he has been smoking cigarettes. He has been smoking an average of .5 packs per day. He has never used smokeless tobacco. He reports current alcohol use. He reports that he does not use drugs. PHYSICAL EXAM     INITIAL VITALS:  oral temperature is 97.8 °F (36.6 °C). His blood pressure is 134/86 and his pulse is 89. His respiration is 14 and oxygen saturation is 98%. Physical Exam  Vitals and nursing note reviewed. Constitutional:       General: He is not in acute distress. Appearance: He is well-developed. HENT:      Head: Normocephalic and atraumatic. Jaw: No trismus. Nose: Nose normal.      Mouth/Throat:      Dentition: Dental tenderness and gingival swelling present. No dental abscesses. Comments: Patient has tenderness with some gingival edema and right lower jaw tooth #30 where he underwent tooth extraction. There is no evidence of bleeding or blood clots in the socket. There is no evidence of Desmond's angina. Oropharynx is clear and airway is patent. Eyes:      Extraocular Movements: Extraocular movements intact. Pupils: Pupils are equal, round, and reactive to light. Cardiovascular:      Rate and Rhythm: Normal rate and regular rhythm. Heart sounds: Normal heart sounds. No murmur heard. Pulmonary:      Effort: Pulmonary effort is normal. No respiratory distress. Breath sounds: Normal breath sounds. Abdominal:      General: Bowel sounds are normal. There is no distension. Palpations: Abdomen is soft. Tenderness: There is no abdominal tenderness. Musculoskeletal:      Cervical back: Normal range of motion and neck supple. Skin:     General: Skin is warm and dry. Neurological:      General: No focal deficit present. Mental Status: He is alert and oriented to person, place, and time. Psychiatric:         Behavior: Behavior normal.         Thought Content:  Thought content normal.         DIFFERENTIAL DIAGNOSIS/ MDM:     Differential diagnosis considered: Right lower jaw tooth ache status post tooth extraction. No evidence of dry socket. DIAGNOSTIC RESULTS     EKG: All EKG's are interpreted by the Emergency Department Physician who either signs or Co-signs this chart in the absence of a cardiologist.    None obtained    RADIOLOGY:   I reviewed the radiologist interpretations:  No orders to display       No results found. LABS:  Labs Reviewed - No data to display      EMERGENCY DEPARTMENT COURSE:   Vitals:    Vitals:    02/18/23 0513   BP: 134/86   Pulse: 89   Resp: 14   Temp: 97.8 °F (36.6 °C)   TempSrc: Oral   SpO2: 98%     -------------------------  BP: 134/86, Temp: 97.8 °F (36.6 °C), Heart Rate: 89, Resp: 14    Orders Placed This Encounter   Medications    clindamycin (CLEOCIN) capsule 300 mg     Order Specific Question:   Antimicrobial Indications     Answer: Other     Order Specific Question:   Other Abx Indication     Answer:   Dental pain    ondansetron (ZOFRAN-ODT) disintegrating tablet 4 mg    clindamycin (CLEOCIN) 300 MG capsule     Sig: Take 1 capsule by mouth 3 times daily for 7 days     Dispense:  21 capsule     Refill:  0    ondansetron (ZOFRAN-ODT) 4 MG disintegrating tablet     Sig: Take 1 tablet by mouth 3 times daily as needed for Nausea or Vomiting     Dispense:  21 tablet     Refill:  0    chlorhexidine (PERIDEX) 0.12 % solution     Sig: Swish and spit 15 mLs 2 times daily for 14 days     Dispense:  420 mL     Refill:  0    acetaminophen (TYLENOL) 500 MG tablet     Sig: Take 2 tablets by mouth 3 times daily as needed for Pain     Dispense:  60 tablet     Refill:  0       Patient has undergone tooth extraction 2 days ago and there is no evidence of dry tooth socket. Plan is to start the patient on clindamycin 300 mg 3 times daily and prescribe him chlorhexidine solution, Tylenol and Zofran ODT as needed for the pain and nausea.   He is advised to follow-up with his oral surgeon, call his office in 2 days for appointment, return if worse. I have reviewed the disposition diagnosis with the patient and or their family/guardian. I have answered their questions and given discharge instructions. They voiced understanding of these instructions and did not have any further questions or complaints. Re-evaluation Notes    Patient is resting comfortably and does not appear to be in any pain or distress prior to discharge      PROCEDURES:  None    FINAL IMPRESSION      1. Pain, dental    2. S/P tooth extraction          DISPOSITION/PLAN   DISPOSITION Decision To Discharge 02/18/2023 05:38:46 AM      Condition on Disposition    Stable    PATIENT REFERRED TO:  Your oral surgeon    Call in 2 days  For reevaluation of current symptoms    [facility] ED  [address]  [phone]    If symptoms worsen      DISCHARGE MEDICATIONS:  New Prescriptions    ACETAMINOPHEN (TYLENOL) 500 MG TABLET    Take 2 tablets by mouth 3 times daily as needed for Pain    CHLORHEXIDINE (PERIDEX) 0.12 % SOLUTION    Swish and spit 15 mLs 2 times daily for 14 days    CLINDAMYCIN (CLEOCIN) 300 MG CAPSULE    Take 1 capsule by mouth 3 times daily for 7 days    ONDANSETRON (ZOFRAN-ODT) 4 MG DISINTEGRATING TABLET    Take 1 tablet by mouth 3 times daily as needed for Nausea or Vomiting       (Please note that portions of this note were completed with a voice recognition program.  Efforts were made to edit the dictations but occasionally words are mis-transcribed.)    Ash Beyer MD,, MD, F.A.C.E.P.   Attending Emergency Physician      Ash Beyer MD  02/18/23 7888

## 2023-03-01 ENCOUNTER — HOSPITAL ENCOUNTER (EMERGENCY)
Age: 33
Discharge: HOME | End: 2023-03-01
Attending: EMERGENCY MEDICINE
Payer: MEDICAID

## 2023-03-01 ENCOUNTER — APPOINTMENT (OUTPATIENT)
Dept: CT IMAGING | Age: 33
End: 2023-03-01
Payer: MEDICAID

## 2023-03-01 VITALS
HEIGHT: 76 IN | HEART RATE: 88 BPM | TEMPERATURE: 98.5 F | OXYGEN SATURATION: 97 % | DIASTOLIC BLOOD PRESSURE: 76 MMHG | SYSTOLIC BLOOD PRESSURE: 140 MMHG | RESPIRATION RATE: 16 BRPM | BODY MASS INDEX: 38.36 KG/M2 | WEIGHT: 315 LBS
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
## 2023-03-01 DIAGNOSIS — S16.1XXA STRAIN OF NECK MUSCLE, INITIAL ENCOUNTER: Primary | ICD-10-CM

## 2023-03-01 PROCEDURE — 70450 CT HEAD/BRAIN W/O DYE: CPT

## 2023-03-01 PROCEDURE — 96372 THER/PROPH/DIAG INJ SC/IM: CPT

## 2023-03-01 PROCEDURE — 99284 EMERGENCY DEPT VISIT MOD MDM: CPT

## 2023-03-01 PROCEDURE — 72125 CT NECK SPINE W/O DYE: CPT

## 2023-03-01 PROCEDURE — 6360000002 HC RX W HCPCS: Performed by: NURSE PRACTITIONER

## 2023-03-01 PROCEDURE — 6370000000 HC RX 637 (ALT 250 FOR IP): Performed by: NURSE PRACTITIONER

## 2023-03-01 RX ORDER — ACETAMINOPHEN 500 MG
1000 TABLET ORAL ONCE
Status: COMPLETED | OUTPATIENT
Start: 2023-03-01 | End: 2023-03-01

## 2023-03-01 RX ORDER — LIDOCAINE 50 MG/G
1 PATCH TOPICAL DAILY
Qty: 10 PATCH | Refills: 0 | Status: SHIPPED | OUTPATIENT
Start: 2023-03-01 | End: 2023-03-11

## 2023-03-01 RX ORDER — ORPHENADRINE CITRATE 30 MG/ML
60 INJECTION INTRAMUSCULAR; INTRAVENOUS ONCE
Status: COMPLETED | OUTPATIENT
Start: 2023-03-01 | End: 2023-03-01

## 2023-03-01 RX ADMIN — ACETAMINOPHEN 1000 MG: 500 TABLET ORAL at 10:41

## 2023-03-01 RX ADMIN — ORPHENADRINE CITRATE 60 MG: 30 INJECTION INTRAMUSCULAR; INTRAVENOUS at 11:16

## 2023-03-01 ASSESSMENT — ENCOUNTER SYMPTOMS
RESPIRATORY NEGATIVE: 1
GASTROINTESTINAL NEGATIVE: 1
EYES NEGATIVE: 1
BACK PAIN: 0

## 2023-03-01 ASSESSMENT — PAIN SCALES - GENERAL: PAINLEVEL_OUTOF10: 8

## 2023-03-01 ASSESSMENT — PAIN DESCRIPTION - LOCATION: LOCATION: NECK
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
## 2023-03-11 ENCOUNTER — HOSPITAL ENCOUNTER (EMERGENCY)
Facility: CLINIC | Age: 33
Discharge: HOME | End: 2023-03-11
Attending: SPECIALIST
Payer: MEDICAID

## 2023-03-11 VITALS
HEART RATE: 98 BPM | TEMPERATURE: 98.2 F | RESPIRATION RATE: 18 BRPM | SYSTOLIC BLOOD PRESSURE: 147 MMHG | DIASTOLIC BLOOD PRESSURE: 97 MMHG | BODY MASS INDEX: 40.29 KG/M2 | OXYGEN SATURATION: 100 % | WEIGHT: 315 LBS

## 2023-03-11 DIAGNOSIS — M79.641 PAIN IN RIGHT HAND: Primary | ICD-10-CM

## 2023-03-11 PROCEDURE — 6370000000 HC RX 637 (ALT 250 FOR IP): Performed by: NURSE PRACTITIONER

## 2023-03-11 PROCEDURE — 99283 EMERGENCY DEPT VISIT LOW MDM: CPT

## 2023-03-11 RX ORDER — TIZANIDINE HYDROCHLORIDE 4 MG/1
CAPSULE, GELATIN COATED ORAL
COMMUNITY

## 2023-03-11 RX ORDER — MELOXICAM 15 MG/1
TABLET ORAL
COMMUNITY

## 2023-03-11 RX ORDER — RIZATRIPTAN BENZOATE 10 MG/1
TABLET ORAL
COMMUNITY

## 2023-03-11 RX ORDER — TRAMADOL HYDROCHLORIDE 50 MG/1
TABLET ORAL
COMMUNITY

## 2023-03-11 RX ORDER — PROMETHAZINE HYDROCHLORIDE 25 MG/1
25 TABLET ORAL ONCE
Status: COMPLETED | OUTPATIENT
Start: 2023-03-11 | End: 2023-03-11

## 2023-03-11 RX ADMIN — PROMETHAZINE HYDROCHLORIDE 25 MG: 25 TABLET ORAL at 20:34

## 2023-03-11 ASSESSMENT — PAIN DESCRIPTION - FREQUENCY: FREQUENCY: CONTINUOUS

## 2023-03-11 ASSESSMENT — PAIN DESCRIPTION - ORIENTATION: ORIENTATION: RIGHT

## 2023-03-11 ASSESSMENT — PAIN DESCRIPTION - DESCRIPTORS: DESCRIPTORS: PATIENT UNABLE TO DESCRIBE

## 2023-03-11 ASSESSMENT — PAIN DESCRIPTION - ONSET: ONSET: ON-GOING

## 2023-03-11 ASSESSMENT — PAIN SCALES - GENERAL: PAINLEVEL_OUTOF10: 10

## 2023-03-11 ASSESSMENT — PAIN DESCRIPTION - PAIN TYPE: TYPE: CHRONIC PAIN

## 2023-03-11 ASSESSMENT — PAIN - FUNCTIONAL ASSESSMENT: PAIN_FUNCTIONAL_ASSESSMENT: 0-10

## 2023-03-11 ASSESSMENT — PAIN DESCRIPTION - LOCATION: LOCATION: FINGER (COMMENT WHICH ONE)

## 2023-03-12 NOTE — ED PROVIDER NOTES
[address] [facility] ED  Emergency Department  Faculty Attestation     Pt Name: Mundo Woods  MRN: [id number]  [place] 1990  Date of evaluation: 3/11/23    I was personally available for consultation in the Emergency Department. Have reviewed everything on the chart that is available and agree with the documentation provided by the [facility] AND [facility], including discussion about the assessment, treatment plan and disposition. Mundo Woods is a 33 y.o. male who presents with Other (Right 4th finger seen [facility] yesterday. Ortho dr. Homa Faith )      Vitals:   Vitals:    03/11/23 2015   BP: (!) 147/97   Pulse: 98   Resp: 18   Temp: 98.2 °F (36.8 °C)   TempSrc: Oral   SpO2: 100%   Weight: (!) 150.1 kg (331 lb)       Impression:   1.  Pain in right hand           Fabi Botello MD  03/11/23 1139

## 2023-03-12 NOTE — DISCHARGE INSTRUCTIONS
Use the Voltaren gel to help with pain. Ice as needed. Use the Ace wrap to help with pain if it helps. Return for any worsening symptoms or concerns.

## 2023-03-12 NOTE — ED NOTES
Pt.ambulatory to room # 5 from waiting area, gait steady. Pt. States his sister dropped him off. Pt. C/o pain to right 4th finger. Pt. Asked if he was seen at [facility] yesterday for this? Pt. States \"yes\". Pt. States he sent a message to ortho and they told him to go to the ED. Pt. States he increased his Neurontin and taking tylenol with no relief. Pt. Also c/o nausea. Pt. Alert and oriented x 4. RR equal and non labored. NaD noted. Call light within reach.       Adriana Carmona RN  03/11/23 2026

## 2023-03-12 NOTE — ED PROVIDER NOTES
[address] [facility] ED  EMERGENCY DEPARTMENT ENCOUNTER      Pt Name: Carla Kline  MRN: [id number]  [place] 1990  Date of evaluation: 3/11/2023  Provider: EDWARD Castellon CNP    CHIEF COMPLAINT       Chief Complaint   Patient presents with    Other     Right 4th finger seen [facility] yesterday. Ortho dr. Noble Teague   (Location/Symptom, Timing/Onset, Context/Setting, Quality, Duration, Modifying Factors, Severity)  Note limiting factors. Carla Kline is a 33 y.o. male who presents to the emergency department for evaluation of right hand pain. Patient has a known history of trigger finger in the right fourth digit and is due to have surgery on 3/20/2023 with Dr. Meghan miranda. He was evaluated at [facility] last evening by the orthopedic resident. He was given an injection of Decadron and they increased his gabapentin to try and help alleviate symptoms until surgery can be performed on the 20th. He is here today because he states the pain is still bad. The shot that he received last evening helped for a little bit but the pain is back. He is asking if we can perform surgery tonight for him        Nursing Notes were reviewed. REVIEW OF SYSTEMS    (2-9 systems for level 4, 10 or more for level 5)     Review of Systems   Musculoskeletal:         Right hand pain     All other systems reviewed and are negative. Except as noted above the remainder of the review of systems was reviewed and negative.        PAST MEDICAL HISTORY     Past Medical History:   Diagnosis Date    Anxiety     Asthma     Bipolar disorder ([facility] [address]     Depression     GERD (gastroesophageal reflux disease)     Headache     Hypertension     Hypothyroidism     Insomnia     Unspecified sleep apnea          SURGICAL HISTORY       Past Surgical History:   Procedure Laterality Date    FINGER TRIGGER RELEASE      GASTRIC BYPASS SURGERY      TONSILLECTOMY      UPPER GASTROINTESTINAL ENDOSCOPY      WISDOM TOOTH EXTRACTION           CURRENT MEDICATIONS       Discharge Medication List as of 3/11/2023  8:34 PM        CONTINUE these medications which have NOT CHANGED    Details   meloxicam (MOBIC) 15 MG tablet TAKE ONE TABLET BY MOUTH DAILY Orally Daily for 30 daysHistorical Med      rizatriptan (MAXALT) 10 MG tablet TAKE ONE TABLET BY MOUTH AT ONSET OF HEADACHE; MAY REPEAT ONE TABLET IN 2 HOURS AS NEEDED.  MAX OF 3 TABLETS IN 24 HOURS for 4Historical Med      traMADol (ULTRAM) 50 MG tablet TAKE ONE TABLET BY MOUTH THREE TIMES A DAY AS NEEDED for 15Historical Med      tiZANidine (ZANAFLEX) 4 MG capsule TAKE ONE TABLET BY MOUTH EVERY 8 HOURS AS NEEDED for 30Historical Med      lidocaine (LIDODERM) 5 % Place 1 patch onto the skin daily for 10 days 12 hours on, 12 hours off., Disp-10 patch, R-0Normal      cariprazine hcl (VRAYLAR) 4.5 MG CAPS capsule Take 4.5 mg by mouth dailyHistorical Med      clonazePAM (KLONOPIN PO) Take 0.75 mg by mouthHistorical Med      emtricitabine-tenofovir alafenamide (DESCOVY) 200-25 MG TABS tablet Take 1 tablet by mouth dailyHistorical Med      gabapentin (NEURONTIN) 100 MG capsule TAKE ONE CAPSULE BY MOUTH THREE TIMES A DAYHistorical Med      montelukast (SINGULAIR) 10 MG tablet TAKE ONE TABLET BY MOUTH EVERY NIGHTHistorical Med      !! ondansetron (ZOFRAN-ODT) 4 MG disintegrating tablet Take 1 tablet by mouth 3 times daily as needed for Nausea or Vomiting, Disp-21 tablet, R-0Normal      !! acetaminophen (TYLENOL) 500 MG tablet Take 2 tablets by mouth 3 times daily as needed for Pain, Disp-60 tablet, R-0Normal      !! ondansetron (ZOFRAN-ODT) 4 MG disintegrating tablet Take 1 tablet by mouth 3 times daily as needed for Nausea or Vomiting, Disp-21 tablet, R-0Normal      !! acetaminophen (TYLENOL) 500 MG tablet Take 2 tablets by mouth every 6 hours as needed for Pain, Disp-120 tablet, R-0Normal      promethazine (PHENERGAN) 12.5 MG tablet Take 12.5 mg by mouth every 6 hours as needed for NauseaHistorical Med      pantoprazole (PROTONIX) 20 MG tablet Take 1 tablet by mouth daily, Disp-30 tablet, R-0Normal      tiZANidine (ZANAFLEX) 4 MG tablet Take 1 tablet by mouth 3 times daily as needed (pain), Disp-15 tablet, R-0Print      Dextromethorphan-Guaifenesin (MUCINEX DM)  MG TB12 Take 1 tablet by mouth 2 times daily, Disp-28 tablet, R-0Print      carvedilol (COREG) 6.25 MG tablet Take 6.25 mg by mouth 2 times daily (with meals)      fexofenadine (ALLEGRA) 180 MG tablet Take 180 mg by mouth dailyHistorical Med      ALPRAZolam (XANAX) 0.5 MG tablet Take 1 mg by mouth nightly as needed for Sleep       amLODIPine (NORVASC) 5 MG tablet   Take 10 mg by mouth daily       Multiple Vitamins-Minerals (CENTROVITE) TABS Historical Med      FLUoxetine (PROZAC) 40 MG capsule Total of 60 mg daily, Disp-30 capsule, R-11      VENTOLIN  (90 BASE) MCG/ACT inhaler Inhale 2 puffs into the lungs every 4 hours as needed for Wheezing or Shortness of Breath (none today). levothyroxine (SYNTHROID) 50 MCG tablet   75 mcg daily        ! ! - Potential duplicate medications found. Please discuss with provider.           ALLERGIES     Chantix [varenicline tartrate], Ciprofloxacin, Nsaids, Pcn [penicillins], Tessalon [benzonatate], and Tolmetin    FAMILY HISTORY       Family History   Adopted: Yes          SOCIAL HISTORY       Social History     Socioeconomic History    Marital status: Single     Spouse name: None    Number of children: None    Years of education: None    Highest education level: None   Tobacco Use    Smoking status: Every Day     Packs/day: 0.50     Types: Cigarettes    Smokeless tobacco: Never   Vaping Use    Vaping Use: Former    Substances: Nicotine   Substance and Sexual Activity    Alcohol use: Yes     Comment: rare;y    Drug use: No    Sexual activity: Yes     Partners: Male       SCREENINGS         Brandon Coma Scale  Eye Opening: Spontaneous  Best Verbal Response: Oriented  Best Motor Response: Obeys commands  Brandon Coma Scale Score: 15                     [facility] Assessment  BP: (!) 147/97  Heart Rate: 98                 PHYSICAL EXAM    (up to 7 for level 4, 8 or more for level 5)     ED Triage Vitals [03/11/23 2015]   BP Temp Temp Source Pulse Resp SpO2 Height Weight   (!) 147/97 -- Oral -- 18 100 % -- --       Physical Exam  Vitals and nursing note reviewed. Constitutional:       General: He is not in acute distress. Appearance: He is well-developed. He is obese. He is not toxic-appearing or diaphoretic. HENT:      Head: Normocephalic and atraumatic. Right Ear: External ear normal.      Left Ear: External ear normal.      Nose: Nose normal.      Mouth/Throat:      Mouth: Mucous membranes are moist.      Pharynx: Oropharynx is clear. Eyes:      General: No scleral icterus. Right eye: No discharge. Left eye: No discharge. Extraocular Movements: Extraocular movements intact. Conjunctiva/sclera: Conjunctivae normal.   Cardiovascular:      Rate and Rhythm: Normal rate and regular rhythm. Pulses: Normal pulses. Heart sounds: Normal heart sounds. Pulmonary:      Effort: Pulmonary effort is normal. No respiratory distress. Breath sounds: Normal breath sounds. Abdominal:      General: Bowel sounds are normal. There is no distension. Palpations: Abdomen is soft. There is no mass (no mass or pulsitile mass). Tenderness: There is no abdominal tenderness. There is no guarding. Musculoskeletal:         General: No swelling, deformity or signs of injury. Normal range of motion. Right hand: No swelling, deformity, lacerations, tenderness or bony tenderness. Normal range of motion. Normal strength. Normal sensation. Normal capillary refill. Normal pulse. Left hand: Normal.      Cervical back: Normal range of motion and neck supple. Right lower leg: No edema. Left lower leg: No edema.    Skin:     General: Skin is warm and dry.      Capillary Refill: Capillary refill takes less than 2 seconds. Neurological:      General: No focal deficit present. Mental Status: He is alert and oriented to person, place, and time. Psychiatric:         Mood and Affect: Mood normal.         Behavior: Behavior normal.       DIAGNOSTIC RESULTS     EKG: All EKG's are interpreted by the Emergency Department Physician who either signs or Co-signs this chart in the absence of a cardiologist.      RADIOLOGY:   Non-plain film images such as CT, Ultrasound and MRI are read by the radiologist. Plain radiographic images are visualized and preliminarily interpreted by the emergency physician with the below findings:        Interpretation per the Radiologist below, if available at the time of this note:    No orders to display         ED BEDSIDE ULTRASOUND:   Performed by ED Physician - none    LABS:  Labs Reviewed - No data to display    All other labs were within normal range or not returned as of this dictation. EMERGENCY DEPARTMENT COURSE and DIFFERENTIAL DIAGNOSIS/MDM:   Vitals:    Vitals:    03/11/23 2015   BP: (!) 147/97   Pulse: 98   Resp: 18   Temp: 98.2 °F (36.8 °C)   TempSrc: Oral   SpO2: 100%   Weight: (!) 150.1 kg (331 lb)           Medical Decision Making    Intrinsic movements of the right hand are intact. He has full range of motion specifically to the right fourth digit but all digits of the right hand. Grasp is equal bilaterally muscle strength 5 of 5 there is no tenderness to palpation of the flexor tendon. No redness no warmth no signs of tenosynovitis. No pain at the wrist.  At this point the patient and I discussed modalities of pain control. He is offered an Ace wrap to help with support. We discussed ice. We also discussed Voltaren gel. He received a shot of Decadron last evening I do not feel second is warranted. I did review his narcotic score which is somewhat concerning given over 25 prescribers in 2 years.  Narcotic risk score was 799. He is already on gabapentin, Xanax, Klonopin, Flexeril pill. Multiple medications which have CNS depression. At this point he does have some nausea and is requesting medication for the nausea due to the fact that his pain is so intense. He is given a 1 tab of Phenergan here and discharged home to follow-up with his specialist as planned for surgery on 3/20/2023      Last Controlled Substance Monitoring Documentation        PDMP Monitoring:    Last PDMP Sintia Tariq as Reviewed:  Review User Review Instant Review Result   Abimbola Kessler 3/11/2023  8:19 PM Reviewed PDMP [1]     Last Controlled Substance Monitoring Documentation      [address] ED from 10/17/2017 in [facility] ED   Attestation The Prescription Monitoring Report for this patient was reviewed today. filed at 10/17/2017 0302   Periodic Controlled Substance Monitoring Possible medication side effects, risk of tolerance and/or dependence, and alternative treatments discussed. filed at 10/17/2017 0302          Urine Drug Screenings (1 yr)       Urine Drug Screen  Collected: 2/4/2017  1:10 AM (Final result)              Drug Scr, Abuse, Ur  Collected: 9/7/2014  8:38 AM (Final result)                  Medication Contract and Consent for Opioid Use Documents Filed        No documents found                       REASSESSMENT          CRITICAL CARE TIME       CONSULTS:  None    PROCEDURES:  Unless otherwise noted below, none     Procedures        FINAL IMPRESSION      1.  Pain in right hand          DISPOSITION/PLAN   DISPOSITION Decision To Discharge 03/11/2023 08:30:30 PM      PATIENT REFERRED TO:  Helene Morton MD  [address] [place] [id number] [address] [facility] ED  [facility]  [phone]    If symptoms worsen    DISCHARGE MEDICATIONS:  Discharge Medication List as of 3/11/2023  8:34 PM        START taking these medications    Details   diclofenac sodium (VOLTAREN) 1 % GEL Apply 2 g topically 4 times daily, Topical, 4 TIMES DAILY Starting Sat 3/11/2023, Disp-50 g, R-0, Normal           Controlled Substances Monitoring:     RX Monitoring 10/17/2017   Attestation The Prescription Monitoring Report for this patient was reviewed today. Periodic Controlled Substance Monitoring Possible medication side effects, risk of tolerance and/or dependence, and alternative treatments discussed.        (Please note that portions of this note were completed with a voice recognition program.  Efforts were made to edit the dictations but occasionally words are mis-transcribed.)    EDWARD Subramanian CNP (electronically signed)  Attending Emergency Physician           EDWARD Subramanian CNP  03/11/23 2051

## 2023-04-13 ENCOUNTER — HOSPITAL ENCOUNTER (EMERGENCY)
Age: 33
Discharge: HOME | End: 2023-04-13
Attending: EMERGENCY MEDICINE

## 2023-04-13 DIAGNOSIS — K04.7 DENTAL INFECTION: Primary | ICD-10-CM

## 2023-04-13 PROCEDURE — 99284 EMERGENCY DEPT VISIT MOD MDM: CPT | Performed by: EMERGENCY MEDICINE

## 2023-04-13 PROCEDURE — 10002803 HB RX 637: Performed by: EMERGENCY MEDICINE

## 2023-04-13 PROCEDURE — 99282 EMERGENCY DEPT VISIT SF MDM: CPT

## 2023-04-13 PROCEDURE — 10002800 HB RX 250 W HCPCS: Performed by: EMERGENCY MEDICINE

## 2023-04-13 PROCEDURE — 96372 THER/PROPH/DIAG INJ SC/IM: CPT | Performed by: EMERGENCY MEDICINE

## 2023-04-13 RX ORDER — AMOXICILLIN AND CLAVULANATE POTASSIUM 875; 125 MG/1; MG/1
1 TABLET, FILM COATED ORAL EVERY 12 HOURS
Qty: 20 TABLET | Refills: 0 | Status: SHIPPED | OUTPATIENT
Start: 2023-04-13 | End: 2023-04-23

## 2023-04-13 RX ORDER — AMOXICILLIN AND CLAVULANATE POTASSIUM 875; 125 MG/1; MG/1
1 TABLET, FILM COATED ORAL ONCE
Status: COMPLETED | OUTPATIENT
Start: 2023-04-13 | End: 2023-04-13

## 2023-04-13 RX ORDER — KETOROLAC TROMETHAMINE 30 MG/ML
15 INJECTION, SOLUTION INTRAMUSCULAR; INTRAVENOUS ONCE
Status: COMPLETED | OUTPATIENT
Start: 2023-04-13 | End: 2023-04-13

## 2023-04-13 RX ADMIN — KETOROLAC TROMETHAMINE 15 MG: 30 INJECTION, SOLUTION INTRAMUSCULAR; INTRAVENOUS at 22:20

## 2023-04-13 RX ADMIN — AMOXICILLIN AND CLAVULANATE POTASSIUM 1 TABLET: 875; 125 TABLET, FILM COATED ORAL at 22:20

## 2023-04-13 ASSESSMENT — PAIN SCALES - GENERAL: PAINLEVEL_OUTOF10: 8

## 2023-04-13 ASSESSMENT — PAIN DESCRIPTION - PAIN TYPE: TYPE: ACUTE PAIN

## 2023-04-14 VITALS
RESPIRATION RATE: 18 BRPM | OXYGEN SATURATION: 98 % | HEART RATE: 100 BPM | HEIGHT: 76 IN | DIASTOLIC BLOOD PRESSURE: 71 MMHG | SYSTOLIC BLOOD PRESSURE: 153 MMHG | TEMPERATURE: 98.6 F | BODY MASS INDEX: 38.36 KG/M2 | WEIGHT: 315 LBS

## 2023-04-29 ENCOUNTER — APPOINTMENT (OUTPATIENT)
Dept: GENERAL RADIOLOGY | Age: 33
End: 2023-04-29
Payer: MEDICAID

## 2023-04-29 ENCOUNTER — HOSPITAL ENCOUNTER (EMERGENCY)
Age: 33
Discharge: HOME | End: 2023-04-29
Attending: EMERGENCY MEDICINE
Payer: MEDICAID

## 2023-04-29 VITALS
BODY MASS INDEX: 42.6 KG/M2 | HEART RATE: 99 BPM | TEMPERATURE: 98.9 F | RESPIRATION RATE: 16 BRPM | OXYGEN SATURATION: 99 % | WEIGHT: 315 LBS | SYSTOLIC BLOOD PRESSURE: 162 MMHG | DIASTOLIC BLOOD PRESSURE: 94 MMHG
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
## 2023-04-29 DIAGNOSIS — M62.838 SPASM OF MUSCLE: ICD-10-CM

## 2023-04-29 DIAGNOSIS — S16.1XXA STRAIN OF NECK MUSCLE, INITIAL ENCOUNTER: Primary | ICD-10-CM

## 2023-04-29 PROCEDURE — 96372 THER/PROPH/DIAG INJ SC/IM: CPT

## 2023-04-29 PROCEDURE — 99284 EMERGENCY DEPT VISIT MOD MDM: CPT

## 2023-04-29 PROCEDURE — 72040 X-RAY EXAM NECK SPINE 2-3 VW: CPT

## 2023-04-29 PROCEDURE — 6360000002 HC RX W HCPCS: Performed by: NURSE PRACTITIONER

## 2023-04-29 RX ORDER — ORPHENADRINE CITRATE 30 MG/ML
60 INJECTION INTRAMUSCULAR; INTRAVENOUS ONCE
Status: COMPLETED | OUTPATIENT
Start: 2023-04-29 | End: 2023-04-29

## 2023-04-29 RX ORDER — CYCLOBENZAPRINE HCL 10 MG
10 TABLET ORAL 3 TIMES DAILY PRN
Qty: 15 TABLET | Refills: 0 | Status: SHIPPED | OUTPATIENT
Start: 2023-04-29 | End: 2023-05-04

## 2023-04-29 RX ADMIN — ORPHENADRINE CITRATE 60 MG: 60 INJECTION INTRAMUSCULAR; INTRAVENOUS at 20:51

## 2023-04-29 ASSESSMENT — PAIN SCALES - GENERAL
PAINLEVEL_OUTOF10: 7
PAINLEVEL_OUTOF10: 7

## 2023-04-29 ASSESSMENT — PAIN - FUNCTIONAL ASSESSMENT: PAIN_FUNCTIONAL_ASSESSMENT: 0-10

## 2023-04-29 ASSESSMENT — ENCOUNTER SYMPTOMS
SHORTNESS OF BREATH: 0
COLOR CHANGE: 0
BACK PAIN: 0

## 2023-04-29 ASSESSMENT — PAIN DESCRIPTION - ORIENTATION: ORIENTATION: LEFT

## 2023-04-29 ASSESSMENT — PAIN DESCRIPTION - LOCATION: LOCATION: NECK;SHOULDER

## 2023-05-02 ENCOUNTER — HOSPITAL ENCOUNTER (EMERGENCY)
Age: 33
Discharge: HOME | End: 2023-05-02
Attending: STUDENT IN AN ORGANIZED HEALTH CARE EDUCATION/TRAINING PROGRAM
Payer: MEDICAID

## 2023-05-02 VITALS
RESPIRATION RATE: 18 BRPM | HEIGHT: 76 IN | SYSTOLIC BLOOD PRESSURE: 141 MMHG | WEIGHT: 315 LBS | OXYGEN SATURATION: 96 % | TEMPERATURE: 98 F | BODY MASS INDEX: 38.36 KG/M2 | DIASTOLIC BLOOD PRESSURE: 95 MMHG | HEART RATE: 101 BPM

## 2023-05-02 DIAGNOSIS — A05.9 FOOD POISONING: Primary | ICD-10-CM

## 2023-05-02 LAB
ABSOLUTE EOS #: 0.3 K/UL (ref 0–0.4)
ABSOLUTE LYMPH #: 2.9 K/UL (ref 1–4.8)
ABSOLUTE MONO #: 0.7 K/UL (ref 0.1–1.3)
ALBUMIN SERPL-MCNC: 4.3 G/DL (ref 3.5–5.2)
ALP SERPL-CCNC: 110 U/L (ref 40–129)
ALT SERPL-CCNC: 52 U/L (ref 5–41)
ANION GAP SERPL CALCULATED.3IONS-SCNC: 11 MMOL/L (ref 9–17)
AST SERPL-CCNC: 32 U/L
BASOPHILS # BLD: 1 % (ref 0–2)
BASOPHILS ABSOLUTE: 0.1 K/UL (ref 0–0.2)
BILIRUB SERPL-MCNC: 0.3 MG/DL (ref 0.3–1.2)
BUN SERPL-MCNC: 10 MG/DL (ref 6–20)
CALCIUM SERPL-MCNC: 9.1 MG/DL (ref 8.6–10.4)
CHLORIDE SERPL-SCNC: 107 MMOL/L (ref 98–107)
CO2 SERPL-SCNC: 20 MMOL/L (ref 20–31)
CREAT SERPL-MCNC: 1.05 MG/DL (ref 0.7–1.2)
EOSINOPHILS RELATIVE PERCENT: 4 % (ref 0–4)
GFR SERPL CREATININE-BSD FRML MDRD: >60 ML/MIN/1.73M2
GLUCOSE SERPL-MCNC: 72 MG/DL (ref 70–99)
HCT VFR BLD AUTO: 41.1 % (ref 41–53)
HGB BLD-MCNC: 14.4 G/DL (ref 13.5–17.5)
LIPASE SERPL-CCNC: 70 U/L (ref 13–60)
LYMPHOCYTES # BLD: 35 % (ref 24–44)
MAGNESIUM SERPL-MCNC: 2 MG/DL (ref 1.6–2.6)
MCH RBC QN AUTO: 30.4 PG (ref 26–34)
MCHC RBC AUTO-ENTMCNC: 35.1 G/DL (ref 31–37)
MCV RBC AUTO: 86.7 FL (ref 80–100)
MONOCYTES # BLD: 9 % (ref 1–7)
PDW BLD-RTO: 13.2 % (ref 11.5–14.9)
PLATELET # BLD AUTO: 240 K/UL (ref 150–450)
PMV BLD AUTO: 8.2 FL (ref 6–12)
POTASSIUM SERPL-SCNC: 3.8 MMOL/L (ref 3.7–5.3)
PROT SERPL-MCNC: 6.9 G/DL (ref 6.4–8.3)
RBC # BLD: 4.74 M/UL (ref 4.5–5.9)
SEG NEUTROPHILS: 51 % (ref 36–66)
SEGMENTED NEUTROPHILS ABSOLUTE COUNT: 4.2 K/UL (ref 1.3–9.1)
SODIUM SERPL-SCNC: 138 MMOL/L (ref 135–144)
WBC # BLD AUTO: 8.1 K/UL (ref 3.5–11)

## 2023-05-02 PROCEDURE — 83690 ASSAY OF LIPASE: CPT

## 2023-05-02 PROCEDURE — 2500000003 HC RX 250 WO HCPCS: Performed by: STUDENT IN AN ORGANIZED HEALTH CARE EDUCATION/TRAINING PROGRAM

## 2023-05-02 PROCEDURE — 80053 COMPREHEN METABOLIC PANEL: CPT

## 2023-05-02 PROCEDURE — 96374 THER/PROPH/DIAG INJ IV PUSH: CPT

## 2023-05-02 PROCEDURE — 85025 COMPLETE CBC W/AUTO DIFF WBC: CPT

## 2023-05-02 PROCEDURE — 83735 ASSAY OF MAGNESIUM: CPT

## 2023-05-02 PROCEDURE — 6370000000 HC RX 637 (ALT 250 FOR IP): Performed by: STUDENT IN AN ORGANIZED HEALTH CARE EDUCATION/TRAINING PROGRAM

## 2023-05-02 PROCEDURE — 99284 EMERGENCY DEPT VISIT MOD MDM: CPT

## 2023-05-02 PROCEDURE — 96375 TX/PRO/DX INJ NEW DRUG ADDON: CPT

## 2023-05-02 PROCEDURE — 36415 COLL VENOUS BLD VENIPUNCTURE: CPT

## 2023-05-02 PROCEDURE — 2580000003 HC RX 258: Performed by: STUDENT IN AN ORGANIZED HEALTH CARE EDUCATION/TRAINING PROGRAM

## 2023-05-02 PROCEDURE — A4216 STERILE WATER/SALINE, 10 ML: HCPCS | Performed by: STUDENT IN AN ORGANIZED HEALTH CARE EDUCATION/TRAINING PROGRAM

## 2023-05-02 PROCEDURE — 6360000002 HC RX W HCPCS: Performed by: STUDENT IN AN ORGANIZED HEALTH CARE EDUCATION/TRAINING PROGRAM

## 2023-05-02 RX ORDER — ONDANSETRON 4 MG/1
4 TABLET, ORALLY DISINTEGRATING ORAL 3 TIMES DAILY PRN
Qty: 21 TABLET | Refills: 0 | Status: SHIPPED | OUTPATIENT
Start: 2023-05-02

## 2023-05-02 RX ORDER — MAGNESIUM HYDROXIDE/ALUMINUM HYDROXICE/SIMETHICONE 120; 1200; 1200 MG/30ML; MG/30ML; MG/30ML
30 SUSPENSION ORAL ONCE
Status: COMPLETED | OUTPATIENT
Start: 2023-05-02 | End: 2023-05-02

## 2023-05-02 RX ORDER — 0.9 % SODIUM CHLORIDE 0.9 %
1000 INTRAVENOUS SOLUTION INTRAVENOUS ONCE
Status: COMPLETED | OUTPATIENT
Start: 2023-05-02 | End: 2023-05-02

## 2023-05-02 RX ORDER — ONDANSETRON 2 MG/ML
4 INJECTION INTRAMUSCULAR; INTRAVENOUS ONCE
Status: COMPLETED | OUTPATIENT
Start: 2023-05-02 | End: 2023-05-02

## 2023-05-02 RX ADMIN — ALUMINUM HYDROXIDE, MAGNESIUM HYDROXIDE, AND SIMETHICONE 30 ML: 200; 200; 20 SUSPENSION ORAL at 03:54

## 2023-05-02 RX ADMIN — ONDANSETRON 4 MG: 2 INJECTION INTRAMUSCULAR; INTRAVENOUS at 03:52

## 2023-05-02 RX ADMIN — SODIUM CHLORIDE 1000 ML: 9 INJECTION, SOLUTION INTRAVENOUS at 03:51

## 2023-05-02 RX ADMIN — FAMOTIDINE 20 MG: 10 INJECTION, SOLUTION INTRAVENOUS at 03:52

## 2023-05-02 ASSESSMENT — LIFESTYLE VARIABLES
HOW OFTEN DO YOU HAVE A DRINK CONTAINING ALCOHOL: NEVER
HOW MANY STANDARD DRINKS CONTAINING ALCOHOL DO YOU HAVE ON A TYPICAL DAY: PATIENT DOES NOT DRINK

## 2023-05-02 ASSESSMENT — ENCOUNTER SYMPTOMS
SORE THROAT: 0
NAUSEA: 1
CHEST TIGHTNESS: 0
RHINORRHEA: 0
SHORTNESS OF BREATH: 0
DIARRHEA: 0
EYE REDNESS: 0
EYE DISCHARGE: 0
ABDOMINAL PAIN: 0
VOMITING: 1

## 2023-05-02 ASSESSMENT — PAIN - FUNCTIONAL ASSESSMENT: PAIN_FUNCTIONAL_ASSESSMENT: 0-10

## 2023-05-02 ASSESSMENT — PAIN DESCRIPTION - LOCATION: LOCATION: ABDOMEN

## 2023-05-02 ASSESSMENT — PAIN DESCRIPTION - PAIN TYPE: TYPE: ACUTE PAIN

## 2023-05-02 ASSESSMENT — PAIN DESCRIPTION - FREQUENCY: FREQUENCY: CONTINUOUS

## 2023-05-02 ASSESSMENT — PAIN SCALES - GENERAL: PAINLEVEL_OUTOF10: 7

## 2023-05-02 ASSESSMENT — PAIN DESCRIPTION - DESCRIPTORS: DESCRIPTORS: ACHING;CRAMPING

## 2023-05-02 NOTE — ED TRIAGE NOTES
Mode of arrival (squad #, walk in, police, etc) : walk in        Chief complaint(s): Abdominal pain/ emesis        Arrival Note (brief scenario, treatment PTA, etc). : since 2200 last night. Pt states, \"I think it was the cheeseburger I ate from [employer]\"        C= \"Have you ever felt that you should Cut down on your drinking? \"  no  A= \"Have people Annoyed you by criticizing your drinking? \"  no  G= \"Have you ever felt bad or Guilty about your drinking? \"  no  E= \"Have you ever had a drink as an Eye-opener first thing in the morning to steady your nerves or to help a hangover? \"  no      Deferred []      Reason for deferring:     *If yes to two or more: probable alcohol abuse. *

## 2023-05-02 NOTE — ED PROVIDER NOTES
EMERGENCY DEPARTMENT ENCOUNTER    Pt Name: Mynor Ponce  MRN: [id number]  [place] 1990  Date of evaluation: 5/2/23  CHIEF COMPLAINT       Chief Complaint   Patient presents with    Abdominal Pain    Emesis     HISTORY OF PRESENT ILLNESS   30 yo M w/ nausea, vomiting, after eating [employer]    States he had a [employer] cheeseburger and then started feeling nauseous and vomited a couple times    No real pain. No fevers chills. Feels like he is about to have diarrhea but none yet    Passing gas            REVIEW OF SYSTEMS     Review of Systems   Constitutional:  Negative for chills and fever. HENT:  Negative for rhinorrhea and sore throat. Eyes:  Negative for discharge and redness. Respiratory:  Negative for chest tightness and shortness of breath. Cardiovascular:  Negative for chest pain. Gastrointestinal:  Positive for nausea and vomiting. Negative for abdominal pain and diarrhea. Genitourinary:  Negative for dysuria and frequency. Musculoskeletal:  Negative for arthralgias and myalgias. Skin:  Negative for rash. Neurological:  Negative for weakness and numbness. Psychiatric/Behavioral:  Negative for suicidal ideas. All other systems reviewed and are negative.   PASTMEDICAL HISTORY     Past Medical History:   Diagnosis Date    Anxiety     Asthma     Bipolar disorder ([facility] [address]     Depression     GERD (gastroesophageal reflux disease)     Headache     Hypertension     Hypothyroidism     Insomnia     Unspecified sleep apnea      Past Problem List  Patient Active Problem List   Diagnosis Code    Hypertension I10    Asthma J45.909    Anxiety F41.9    Insomnia G47.00    Bipolar disorder ([facility] [address] F31.9    Hypothyroidism E03.9     SURGICAL HISTORY       Past Surgical History:   Procedure Laterality Date    FINGER TRIGGER RELEASE      GASTRIC BYPASS SURGERY      TONSILLECTOMY      UPPER GASTROINTESTINAL ENDOSCOPY      WISDOM TOOTH EXTRACTION       CURRENT MEDICATIONS       Previous Medications

## 2023-05-29 ENCOUNTER — APPOINTMENT (OUTPATIENT)
Dept: CT IMAGING | Age: 33
End: 2023-05-29
Attending: NURSE PRACTITIONER

## 2023-05-29 ENCOUNTER — HOSPITAL ENCOUNTER (EMERGENCY)
Age: 33
Discharge: LEFT BEFORE DISCHARGE | End: 2023-05-29

## 2023-05-29 VITALS
DIASTOLIC BLOOD PRESSURE: 77 MMHG | HEART RATE: 97 BPM | HEIGHT: 76 IN | WEIGHT: 315 LBS | BODY MASS INDEX: 38.36 KG/M2 | TEMPERATURE: 98.6 F | RESPIRATION RATE: 20 BRPM | SYSTOLIC BLOOD PRESSURE: 135 MMHG | OXYGEN SATURATION: 99 %

## 2023-05-29 DIAGNOSIS — R07.9 CHEST PAIN, UNSPECIFIED TYPE: Primary | ICD-10-CM

## 2023-05-29 LAB
ALBUMIN SERPL-MCNC: 3.5 G/DL (ref 3.6–5.1)
ALBUMIN/GLOB SERPL: 1.1 {RATIO} (ref 1–2.4)
ALP SERPL-CCNC: 132 UNITS/L (ref 45–117)
ALT SERPL-CCNC: 74 UNITS/L
ANION GAP SERPL CALC-SCNC: 15 MMOL/L (ref 7–19)
APTT PPP: 27 SEC (ref 22–30)
AST SERPL-CCNC: 41 UNITS/L
ATRIAL RATE (BPM): 98
BASOPHILS # BLD: 0.1 K/MCL (ref 0–0.3)
BASOPHILS NFR BLD: 1 %
BILIRUB SERPL-MCNC: 0.5 MG/DL (ref 0.2–1)
BUN SERPL-MCNC: 6 MG/DL (ref 6–20)
BUN/CREAT SERPL: 6 (ref 7–25)
CALCIUM SERPL-MCNC: 8.2 MG/DL (ref 8.4–10.2)
CHLORIDE SERPL-SCNC: 105 MMOL/L (ref 97–110)
CO2 SERPL-SCNC: 22 MMOL/L (ref 21–32)
CREAT SERPL-MCNC: 1.04 MG/DL (ref 0.67–1.17)
DEPRECATED RDW RBC: 43.9 FL (ref 39–50)
DIASTOLIC BLOOD PRESSURE: 77
EOSINOPHIL # BLD: 0.2 K/MCL (ref 0–0.5)
EOSINOPHIL NFR BLD: 3 %
ERYTHROCYTE [DISTWIDTH] IN BLOOD: 14.3 % (ref 11–15)
FASTING DURATION TIME PATIENT: ABNORMAL H
GFR SERPLBLD BASED ON 1.73 SQ M-ARVRAT: >90 ML/MIN
GLOBULIN SER-MCNC: 3.1 G/DL (ref 2–4)
GLUCOSE SERPL-MCNC: 132 MG/DL (ref 70–99)
HCT VFR BLD CALC: 36.4 % (ref 39–51)
HGB BLD-MCNC: 12.6 G/DL (ref 13–17)
IMM GRANULOCYTES # BLD AUTO: 0 K/MCL (ref 0–0.2)
IMM GRANULOCYTES # BLD: 1 %
INR PPP: 1
LYMPHOCYTES # BLD: 3.2 K/MCL (ref 1–4.8)
LYMPHOCYTES NFR BLD: 45 %
MAGNESIUM SERPL-MCNC: 1.9 MG/DL (ref 1.7–2.4)
MCH RBC QN AUTO: 30.1 PG (ref 26–34)
MCHC RBC AUTO-ENTMCNC: 34.6 G/DL (ref 32–36.5)
MCV RBC AUTO: 87.1 FL (ref 78–100)
MONOCYTES # BLD: 0.6 K/MCL (ref 0.3–0.9)
MONOCYTES NFR BLD: 9 %
NEUTROPHILS # BLD: 2.8 K/MCL (ref 1.8–7.7)
NEUTROPHILS NFR BLD: 41 %
NRBC BLD MANUAL-RTO: 0 /100 WBC
NT-PROBNP SERPL-MCNC: 26 PG/ML
P AXIS (DEGREES): 61
PLATELET # BLD AUTO: 207 K/MCL (ref 140–450)
POTASSIUM SERPL-SCNC: 3.7 MMOL/L (ref 3.4–5.1)
PR-INTERVAL (MSEC): 146
PROT SERPL-MCNC: 6.6 G/DL (ref 6.4–8.2)
PROTHROMBIN TIME: 10.1 SEC (ref 9.7–11.8)
QRS-INTERVAL (MSEC): 102
QT-INTERVAL (MSEC): 336
QTC: 429
R AXIS (DEGREES): 68
RAINBOW EXTRA TUBES HOLD SPECIMEN: NORMAL
RBC # BLD: 4.18 MIL/MCL (ref 4.5–5.9)
REPORT TEXT: NORMAL
SODIUM SERPL-SCNC: 138 MMOL/L (ref 135–145)
SYSTOLIC BLOOD PRESSURE: 135
T AXIS (DEGREES): 62
TROPONIN I SERPL DL<=0.01 NG/ML-MCNC: 5 NG/L
VENTRICULAR RATE EKG/MIN (BPM): 98
WBC # BLD: 6.9 K/MCL (ref 4.2–11)

## 2023-05-29 PROCEDURE — 99285 EMERGENCY DEPT VISIT HI MDM: CPT | Performed by: NURSE PRACTITIONER

## 2023-05-29 PROCEDURE — 83880 ASSAY OF NATRIURETIC PEPTIDE: CPT | Performed by: NURSE PRACTITIONER

## 2023-05-29 PROCEDURE — 36415 COLL VENOUS BLD VENIPUNCTURE: CPT

## 2023-05-29 PROCEDURE — 85025 COMPLETE CBC W/AUTO DIFF WBC: CPT | Performed by: NURSE PRACTITIONER

## 2023-05-29 PROCEDURE — G1004 CDSM NDSC: HCPCS | Performed by: RADIOLOGY

## 2023-05-29 PROCEDURE — 10002807 HB RX 258: Performed by: NURSE PRACTITIONER

## 2023-05-29 PROCEDURE — 99283 EMERGENCY DEPT VISIT LOW MDM: CPT

## 2023-05-29 PROCEDURE — 83735 ASSAY OF MAGNESIUM: CPT | Performed by: NURSE PRACTITIONER

## 2023-05-29 PROCEDURE — 71275 CT ANGIOGRAPHY CHEST: CPT | Performed by: RADIOLOGY

## 2023-05-29 PROCEDURE — 10002805 HB CONTRAST AGENT: Performed by: NURSE PRACTITIONER

## 2023-05-29 PROCEDURE — 80053 COMPREHEN METABOLIC PANEL: CPT | Performed by: NURSE PRACTITIONER

## 2023-05-29 PROCEDURE — 93005 ELECTROCARDIOGRAM TRACING: CPT | Performed by: NURSE PRACTITIONER

## 2023-05-29 PROCEDURE — 93010 ELECTROCARDIOGRAM REPORT: CPT | Performed by: STUDENT IN AN ORGANIZED HEALTH CARE EDUCATION/TRAINING PROGRAM

## 2023-05-29 PROCEDURE — G1004 CDSM NDSC: HCPCS

## 2023-05-29 PROCEDURE — 84484 ASSAY OF TROPONIN QUANT: CPT | Performed by: NURSE PRACTITIONER

## 2023-05-29 PROCEDURE — 71275 CT ANGIOGRAPHY CHEST: CPT

## 2023-05-29 PROCEDURE — 85730 THROMBOPLASTIN TIME PARTIAL: CPT | Performed by: NURSE PRACTITIONER

## 2023-05-29 PROCEDURE — 85610 PROTHROMBIN TIME: CPT | Performed by: NURSE PRACTITIONER

## 2023-05-29 RX ADMIN — SODIUM CHLORIDE 100 ML: 900 INJECTION INTRAVENOUS at 01:20

## 2023-05-29 RX ADMIN — IOHEXOL 150 ML: 350 INJECTION, SOLUTION INTRAVENOUS at 01:22

## 2023-05-29 ASSESSMENT — PAIN SCALES - GENERAL
PAINLEVEL_OUTOF10: 7
PAINLEVEL_OUTOF10: 8

## 2023-05-29 ASSESSMENT — PAIN DESCRIPTION - PAIN TYPE: TYPE: CHEST PAIN

## 2023-08-23 ENCOUNTER — HOSPITAL ENCOUNTER (EMERGENCY)
Dept: HOSPITAL 41 - JD.ED | Age: 33
Discharge: HOME | End: 2023-08-23
Payer: SELF-PAY

## 2023-08-23 DIAGNOSIS — Z87.891: ICD-10-CM

## 2023-08-23 DIAGNOSIS — Z88.0: ICD-10-CM

## 2023-08-23 DIAGNOSIS — I10: ICD-10-CM

## 2023-08-23 DIAGNOSIS — Z88.6: ICD-10-CM

## 2023-08-23 DIAGNOSIS — K08.89: Primary | ICD-10-CM

## 2023-08-23 DIAGNOSIS — E66.9: ICD-10-CM

## 2023-08-23 DIAGNOSIS — J45.909: ICD-10-CM

## 2023-08-23 PROCEDURE — 99282 EMERGENCY DEPT VISIT SF MDM: CPT

## 2023-08-23 PROCEDURE — 64400 NJX AA&/STRD TRIGEMINAL NRV: CPT

## 2023-09-05 ENCOUNTER — HOSPITAL ENCOUNTER (EMERGENCY)
Dept: HOSPITAL 41 - JD.ED | Age: 33
LOS: 1 days | Discharge: HOME | End: 2023-09-06
Payer: MEDICAID
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
## 2023-09-05 DIAGNOSIS — E66.9: ICD-10-CM

## 2023-09-05 DIAGNOSIS — Z88.6: ICD-10-CM

## 2023-09-05 DIAGNOSIS — J45.909: ICD-10-CM

## 2023-09-05 DIAGNOSIS — S63.501A: Primary | ICD-10-CM

## 2023-09-05 DIAGNOSIS — I10: ICD-10-CM
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
## 2023-09-05 DIAGNOSIS — Z88.0: ICD-10-CM
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
## 2023-09-05 DIAGNOSIS — Z79.899: ICD-10-CM

## 2023-09-05 DIAGNOSIS — Z88.8: ICD-10-CM

## 2023-09-05 PROCEDURE — 73110 X-RAY EXAM OF WRIST: CPT

## 2023-09-05 PROCEDURE — 99283 EMERGENCY DEPT VISIT LOW MDM: CPT

## 2023-09-15 ENCOUNTER — HOSPITAL ENCOUNTER (EMERGENCY)
Facility: CLINIC | Age: 33
Discharge: HOME | End: 2023-09-15
Attending: EMERGENCY MEDICINE
Payer: MEDICAID

## 2023-09-15 VITALS
OXYGEN SATURATION: 97 % | WEIGHT: 315 LBS | SYSTOLIC BLOOD PRESSURE: 150 MMHG | RESPIRATION RATE: 16 BRPM | HEIGHT: 76 IN | BODY MASS INDEX: 38.36 KG/M2 | DIASTOLIC BLOOD PRESSURE: 110 MMHG | HEART RATE: 112 BPM | TEMPERATURE: 98.1 F

## 2023-09-15 DIAGNOSIS — S39.012A STRAIN OF LUMBAR REGION, INITIAL ENCOUNTER: Primary | ICD-10-CM

## 2023-09-15 DIAGNOSIS — M62.838 SPASM OF MUSCLE: ICD-10-CM

## 2023-09-15 PROCEDURE — 99284 EMERGENCY DEPT VISIT MOD MDM: CPT

## 2023-09-15 PROCEDURE — 96374 THER/PROPH/DIAG INJ IV PUSH: CPT

## 2023-09-15 PROCEDURE — 6360000002 HC RX W HCPCS: Performed by: PHYSICIAN ASSISTANT

## 2023-09-15 PROCEDURE — 96372 THER/PROPH/DIAG INJ SC/IM: CPT

## 2023-09-15 RX ORDER — TIZANIDINE 4 MG/1
4 TABLET ORAL EVERY 8 HOURS PRN
Qty: 9 TABLET | Refills: 0 | Status: SHIPPED | OUTPATIENT
Start: 2023-09-15 | End: 2023-09-18

## 2023-09-15 RX ORDER — ORPHENADRINE CITRATE 30 MG/ML
60 INJECTION INTRAMUSCULAR; INTRAVENOUS ONCE
Status: COMPLETED | OUTPATIENT
Start: 2023-09-15 | End: 2023-09-15

## 2023-09-15 RX ORDER — KETOROLAC TROMETHAMINE 30 MG/ML
30 INJECTION, SOLUTION INTRAMUSCULAR; INTRAVENOUS ONCE
Status: COMPLETED | OUTPATIENT
Start: 2023-09-15 | End: 2023-09-15

## 2023-09-15 RX ADMIN — ORPHENADRINE CITRATE 60 MG: 60 INJECTION INTRAMUSCULAR; INTRAVENOUS at 21:40

## 2023-09-15 RX ADMIN — KETOROLAC TROMETHAMINE 30 MG: 30 INJECTION, SOLUTION INTRAMUSCULAR at 21:40

## 2023-09-15 ASSESSMENT — PAIN - FUNCTIONAL ASSESSMENT
PAIN_FUNCTIONAL_ASSESSMENT: 0-10
PAIN_FUNCTIONAL_ASSESSMENT: ACTIVITIES ARE NOT PREVENTED

## 2023-09-15 ASSESSMENT — PAIN DESCRIPTION - FREQUENCY: FREQUENCY: CONTINUOUS

## 2023-09-15 ASSESSMENT — ENCOUNTER SYMPTOMS
ALLERGIC/IMMUNOLOGIC NEGATIVE: 1
BACK PAIN: 1
GASTROINTESTINAL NEGATIVE: 1
EYES NEGATIVE: 1
RESPIRATORY NEGATIVE: 1

## 2023-09-15 ASSESSMENT — PAIN DESCRIPTION - PAIN TYPE: TYPE: ACUTE PAIN

## 2023-09-15 ASSESSMENT — PAIN DESCRIPTION - ONSET: ONSET: ON-GOING

## 2023-09-15 ASSESSMENT — PAIN DESCRIPTION - DESCRIPTORS: DESCRIPTORS: ACHING

## 2023-09-15 ASSESSMENT — PAIN SCALES - GENERAL: PAINLEVEL_OUTOF10: 7

## 2023-09-15 ASSESSMENT — PAIN DESCRIPTION - LOCATION: LOCATION: BACK;NECK

## 2023-09-16 NOTE — ED PROVIDER NOTES
[facility]  [address]  Phone: [phone]      Pt Name: Leidy Groves  MRN: [id number]  [address] [facility] 1990  Date of evaluation: 9/15/23    CHIEF COMPLAINT       Chief Complaint   Patient presents with    Back Pain    Neck Pain       PAST MEDICAL HISTORY    has a past medical history of Anxiety, Asthma, Bipolar disorder ([address]), Depression, GERD (gastroesophageal reflux disease), Headache, Hypertension, Hypothyroidism, Insomnia, and Unspecified sleep apnea. SURGICAL HISTORY      has a past surgical history that includes [place] tooth extraction; Upper gastrointestinal endoscopy; Finger trigger release; Tonsillectomy; and Gastric bypass surgery. CURRENT MEDICATIONS       Discharge Medication List as of 9/15/2023 10:08 PM        CONTINUE these medications which have NOT CHANGED    Details   !! ondansetron (ZOFRAN-ODT) 4 MG disintegrating tablet Take 1 tablet by mouth 3 times daily as needed for Nausea or Vomiting, Disp-21 tablet, R-0Normal      meloxicam (MOBIC) 15 MG tablet TAKE ONE TABLET BY MOUTH DAILY Orally Daily for 30 daysHistorical Med      rizatriptan (MAXALT) 10 MG tablet TAKE ONE TABLET BY MOUTH AT ONSET OF HEADACHE; MAY REPEAT ONE TABLET IN 2 HOURS AS NEEDED.  MAX OF 3 TABLETS IN 24 HOURS for 4Historical Med      traMADol (ULTRAM) 50 MG tablet TAKE ONE TABLET BY MOUTH THREE TIMES A DAY AS NEEDED for 15Historical Med      diclofenac sodium (VOLTAREN) 1 % GEL Apply 2 g topically 4 times daily, Topical, 4 TIMES DAILY Starting Sat 3/11/2023, Disp-50 g, R-0, Normal      cariprazine hcl (VRAYLAR) 4.5 MG CAPS capsule Take 4.5 mg by mouth dailyHistorical Med      clonazePAM (KLONOPIN PO) Take 0.75 mg by mouthHistorical Med      emtricitabine-tenofovir alafenamide (DESCOVY) 200-25 MG TABS tablet Take 1 tablet by mouth dailyHistorical Med      gabapentin (NEURONTIN) 100 MG capsule TAKE ONE CAPSULE BY MOUTH THREE TIMES A DAYHistorical Med      montelukast (SINGULAIR) 10 MG tablet TAKE ONE TABLET BY MOUTH EVERY NIGHTHistorical Med      !! ondansetron (ZOFRAN-ODT) 4 MG disintegrating tablet Take 1 tablet by mouth 3 times daily as needed for Nausea or Vomiting, Disp-21 tablet, R-0Normal      !! acetaminophen (TYLENOL) 500 MG tablet Take 2 tablets by mouth 3 times daily as needed for Pain, Disp-60 tablet, R-0Normal      !! ondansetron (ZOFRAN-ODT) 4 MG disintegrating tablet Take 1 tablet by mouth 3 times daily as needed for Nausea or Vomiting, Disp-21 tablet, R-0Normal      !! acetaminophen (TYLENOL) 500 MG tablet Take 2 tablets by mouth every 6 hours as needed for Pain, Disp-120 tablet, R-0Normal      promethazine (PHENERGAN) 12.5 MG tablet Take 12.5 mg by mouth every 6 hours as needed for NauseaHistorical Med      pantoprazole (PROTONIX) 20 MG tablet Take 1 tablet by mouth daily, Disp-30 tablet, R-0Normal      Dextromethorphan-Guaifenesin (MUCINEX DM)  MG TB12 Take 1 tablet by mouth 2 times daily, Disp-28 tablet, R-0Print      carvedilol (COREG) 6.25 MG tablet Take 6.25 mg by mouth 2 times daily (with meals)      fexofenadine (ALLEGRA) 180 MG tablet Take 180 mg by mouth dailyHistorical Med      ALPRAZolam (XANAX) 0.5 MG tablet Take 2 tablets by mouth nightly as needed for Sleep. Historical Med      amLODIPine (NORVASC) 5 MG tablet   Take 10 mg by mouth daily       Multiple Vitamins-Minerals (CENTROVITE) TABS Historical Med      FLUoxetine (PROZAC) 40 MG capsule Total of 60 mg daily, Disp-30 capsule, R-11      VENTOLIN  (90 BASE) MCG/ACT inhaler Inhale 2 puffs into the lungs every 4 hours as needed for Wheezing or Shortness of Breath (none today). levothyroxine (SYNTHROID) 50 MCG tablet   75 mcg daily        ! ! - Potential duplicate medications found. Please discuss with provider. ALLERGIES     is allergic to chantix [varenicline tartrate], ciprofloxacin, nsaids, pcn [penicillins], tessalon [benzonatate], and tolmetin.       Vitals:    09/15/23 2113   BP: (!)
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
## 2023-09-17 ENCOUNTER — HOSPITAL ENCOUNTER (EMERGENCY)
Facility: CLINIC | Age: 33
Discharge: HOME | End: 2023-09-17
Attending: SPECIALIST
Payer: MEDICAID

## 2023-09-17 VITALS
HEART RATE: 119 BPM | TEMPERATURE: 97.8 F | RESPIRATION RATE: 18 BRPM | HEIGHT: 76 IN | WEIGHT: 315 LBS | BODY MASS INDEX: 38.36 KG/M2 | OXYGEN SATURATION: 100 % | SYSTOLIC BLOOD PRESSURE: 129 MMHG | DIASTOLIC BLOOD PRESSURE: 79 MMHG

## 2023-09-17 DIAGNOSIS — J01.00 ACUTE MAXILLARY SINUSITIS, RECURRENCE NOT SPECIFIED: Primary | ICD-10-CM

## 2023-09-17 PROCEDURE — 99283 EMERGENCY DEPT VISIT LOW MDM: CPT

## 2023-09-17 PROCEDURE — 6370000000 HC RX 637 (ALT 250 FOR IP): Performed by: SPECIALIST

## 2023-09-17 RX ORDER — DOXYCYCLINE HYCLATE 100 MG/1
100 CAPSULE ORAL 2 TIMES DAILY
Qty: 20 CAPSULE | Refills: 0 | Status: SHIPPED | OUTPATIENT
Start: 2023-09-17 | End: 2023-09-27

## 2023-09-17 RX ORDER — DOXYCYCLINE 100 MG/1
100 CAPSULE ORAL ONCE
Status: COMPLETED | OUTPATIENT
Start: 2023-09-17 | End: 2023-09-17

## 2023-09-17 RX ADMIN — DOXYCYCLINE 100 MG: 100 CAPSULE ORAL at 01:40

## 2023-09-17 ASSESSMENT — ENCOUNTER SYMPTOMS
SORE THROAT: 1
SHORTNESS OF BREATH: 1
NAUSEA: 0
WHEEZING: 0
COUGH: 1
DIARRHEA: 0
SINUS PAIN: 1
SINUS PRESSURE: 1
ABDOMINAL PAIN: 0

## 2023-09-17 NOTE — DISCHARGE INSTRUCTIONS
Please understand that at this time there is no evidence for a more serious underlying process, but that early in the process of an illness or injury, an emergency department workup can be falsely reassuring. You should contact your family doctor within the next 48 hours for a follow up appointment    [address]!!!    From [facility] and [address] [place] [address] Emergency Services    On behalf of the Emergency Department staff at [employer], I would like to thank you for giving us the opportunity to address your health care needs and concerns. We hope that during your visit, our service was delivered in a professional and caring manner. Please keep [facility] in mind as we walk with you down the path to your own personal wellness. Please expect an automated text message or email from us so we can ask a few questions about your health and progress. Based on your answers, a clinician may call you back to offer help and instructions. Please understand that early in the process of an illness or injury, an emergency department workup can be falsely reassuring. If you notice any worsening, changing or persistent symptoms please call your family doctor or return to the ER immediately. Tell us how we did during your visit at [email or web address]   and let us know about your experience

## 2023-09-17 NOTE — ED PROVIDER NOTES
[facility]  [address]  Phone: [phone]      Pt Name: Liu Mott  MRN: [id number]  [address] [facility] 1990  Date of evaluation: 9/17/2023      CHIEF COMPLAINT       Chief Complaint   Patient presents with    Cough     clear    Nasal Congestion                HISTORY OF PRESENT ILLNESS    Liu Mott is a 29 y.o. male who presents   Chief Complaint   Patient presents with    Cough     clear    Nasal Congestion          . 30-year-old male patient presents to the emergency department for evaluation of cough mostly while laying down since 2 days prior to arrival associated with nasal congestion with postnasal drainage. He also has been having bilateral maxillary sinus pressure and pain. Patient states he has been around his son who is also sick with a cough. He admits to having shortness of breath but denies any wheezing. He denies any fever or chills. He has history of bronchial asthma and vapes nicotine. He admits to having sore throat with pain with the swallowing. He denies any ear pain, chest pain, palpitations, abdominal pain, vomiting or diarrhea. There are no exacerbating or relieving factors and patient has not taken any medications for the above symptoms. REVIEW OF SYSTEMS     Review of Systems   Constitutional:  Negative for chills and fever. HENT:  Positive for congestion, postnasal drip, sinus pressure, sinus pain and sore throat. Respiratory:  Positive for cough and shortness of breath. Negative for wheezing. Cardiovascular:  Negative for chest pain, palpitations and leg swelling. Gastrointestinal:  Negative for abdominal pain, diarrhea and nausea. Genitourinary:  Negative for dysuria and frequency. Neurological:  Negative for dizziness, light-headedness and headaches. All other systems reviewed and are negative.       PAST MEDICAL HISTORY    has a past medical history of Anxiety, Asthma, Bipolar disorder ([address]), Depression, GERD

## 2023-09-20 ENCOUNTER — HOSPITAL ENCOUNTER (EMERGENCY)
Facility: CLINIC | Age: 33
Discharge: HOME | End: 2023-09-20
Attending: EMERGENCY MEDICINE
Payer: MEDICAID

## 2023-09-20 VITALS
HEART RATE: 107 BPM | OXYGEN SATURATION: 98 % | RESPIRATION RATE: 16 BRPM | DIASTOLIC BLOOD PRESSURE: 87 MMHG | WEIGHT: 315 LBS | HEIGHT: 76 IN | BODY MASS INDEX: 38.36 KG/M2 | TEMPERATURE: 98.3 F | SYSTOLIC BLOOD PRESSURE: 128 MMHG

## 2023-09-20 DIAGNOSIS — J06.9 ACUTE UPPER RESPIRATORY INFECTION: Primary | ICD-10-CM

## 2023-09-20 LAB
FLUAV AG SPEC QL: NEGATIVE
FLUBV AG SPEC QL: NEGATIVE
S PYO AG THROAT QL: NEGATIVE
SARS-COV-2 RDRP RESP QL NAA+PROBE: NOT DETECTED
SPECIMEN DESCRIPTION: NORMAL
SPECIMEN SOURCE: NORMAL

## 2023-09-20 PROCEDURE — 87880 STREP A ASSAY W/OPTIC: CPT

## 2023-09-20 PROCEDURE — 99283 EMERGENCY DEPT VISIT LOW MDM: CPT

## 2023-09-20 PROCEDURE — 87635 SARS-COV-2 COVID-19 AMP PRB: CPT

## 2023-09-20 PROCEDURE — 87804 INFLUENZA ASSAY W/OPTIC: CPT

## 2023-09-20 PROCEDURE — 6370000000 HC RX 637 (ALT 250 FOR IP): Performed by: NURSE PRACTITIONER

## 2023-09-20 RX ORDER — PREDNISONE 20 MG/1
60 TABLET ORAL ONCE
Status: COMPLETED | OUTPATIENT
Start: 2023-09-20 | End: 2023-09-20

## 2023-09-20 RX ORDER — PREDNISONE 20 MG/1
TABLET ORAL
Qty: 15 TABLET | Refills: 0 | Status: SHIPPED | OUTPATIENT
Start: 2023-09-20 | End: 2023-09-30

## 2023-09-20 RX ORDER — DEXTROMETHORPHAN HYDROBROMIDE AND PROMETHAZINE HYDROCHLORIDE 15; 6.25 MG/5ML; MG/5ML
5 SYRUP ORAL 4 TIMES DAILY PRN
Qty: 118 ML | Refills: 0 | Status: SHIPPED | OUTPATIENT
Start: 2023-09-20 | End: 2023-09-27

## 2023-09-20 RX ADMIN — PREDNISONE 60 MG: 20 TABLET ORAL at 22:08

## 2023-09-20 ASSESSMENT — LIFESTYLE VARIABLES
HOW MANY STANDARD DRINKS CONTAINING ALCOHOL DO YOU HAVE ON A TYPICAL DAY: PATIENT DOES NOT DRINK
HOW OFTEN DO YOU HAVE A DRINK CONTAINING ALCOHOL: NEVER

## 2023-09-20 ASSESSMENT — PAIN DESCRIPTION - PAIN TYPE: TYPE: ACUTE PAIN

## 2023-09-20 ASSESSMENT — PATIENT HEALTH QUESTIONNAIRE - PHQ9
SUM OF ALL RESPONSES TO PHQ QUESTIONS 1-9: 0
SUM OF ALL RESPONSES TO PHQ9 QUESTIONS 1 & 2: 0
SUM OF ALL RESPONSES TO PHQ QUESTIONS 1-9: 0
SUM OF ALL RESPONSES TO PHQ QUESTIONS 1-9: 0
2. FEELING DOWN, DEPRESSED OR HOPELESS: 0
1. LITTLE INTEREST OR PLEASURE IN DOING THINGS: 0
SUM OF ALL RESPONSES TO PHQ QUESTIONS 1-9: 0

## 2023-09-20 ASSESSMENT — PAIN DESCRIPTION - LOCATION: LOCATION: THROAT

## 2023-09-20 ASSESSMENT — PAIN - FUNCTIONAL ASSESSMENT: PAIN_FUNCTIONAL_ASSESSMENT: 0-10

## 2023-09-21 ASSESSMENT — ENCOUNTER SYMPTOMS
ABDOMINAL PAIN: 0
DIARRHEA: 1
COUGH: 1
NAUSEA: 0
CONSTIPATION: 0
CHEST TIGHTNESS: 1
SORE THROAT: 1
VOMITING: 0
BACK PAIN: 0
SHORTNESS OF BREATH: 0
EYE DISCHARGE: 0

## 2023-09-21 NOTE — ED PROVIDER NOTES
[id number]  0.1 [facility] ED  EMERGENCY DEPARTMENT ENCOUNTER      Pt Name: Trung Lam  MRN: [id number]  [address] [facility] 1990  Date of evaluation: 9/20/2023  Provider: EDWARD Alfonso [facility]       Chief Complaint   Patient presents with    Cough    Nasal Congestion    Pharyngitis    Chest Pain    Fatigue    Concern For COVID-19         HISTORY OF PRESENT ILLNESS   (Location/Symptom, Timing/Onset, Context/Setting, Quality, Duration, Modifying Factors, Severity)  Note limiting factors. Trung Lam is a 29 y.o. male who presents to the emergency department      This is a nontoxic-appearing 41-year-old male presenting to the emergency department for continued ongoing upper respiratory-like infection symptoms the patient was initially evaluated in the emergency department on 9/17/2023; had COVID-19 testing, reports that he was started on doxycycline the patient does have a history of vaping tobacco smoking, the patient also has been using over-the-counter DayQuil and NyQuil, has been using Tylenol over-the-counter, states that he is continuing to have nasal congestion, intermittently productive cough with clear yellow sputum, he has chest pain with coughing, reports continued sore throat, the patient is unable to take NSAIDs due to history of gastric bypass surgery; the patient reports that he is not feeling any better prompting him to come to the emergency department for reevaluation today. The history is provided by the patient and medical records. Nursing Notes were reviewed. REVIEW OF SYSTEMS    (2-9 systems for level 4, 10 or more for level 5)     Review of Systems   Constitutional:  Positive for chills and fatigue. Negative for fever. HENT:  Positive for congestion and sore throat. Negative for ear pain and sneezing. Eyes:  Negative for discharge and visual disturbance. Respiratory:  Positive for cough and chest tightness. Negative for shortness of breath.

## 2023-09-21 NOTE — ED PROVIDER NOTES
Pt Name: Kerrie Medina  MRN: [id number]  [address] [facility] 1990  Date of evaluation: 9/20/23       Kerrie Medina is a 29 y.o. male who presents with Cough, Nasal Congestion, Pharyngitis, Chest Pain, Fatigue, and Concern For COVID-19        Based on the medical record, the care appears appropriate. I was personally available for consultation in the Emergency Department.     Becka Paredes MD  Attending Emergency  Physician        Becka Paredes MD  09/20/23 7095

## 2023-09-21 NOTE — DISCHARGE INSTRUCTIONS
Complete the antibiotic as previously prescribed. Continue with the albuterol MDI inhaler as prescribed. Complete prednisone as prescribed. Tylenol over-the-counter as directed to help with pain or fevers. Member that over-the-counter cough and cold medications have Tylenol in them. Return to an ER setting: Fevers not responding to Tylenol, continued or worsening breathing difficulty, chest pain, weakness, confusion; or any other concerning symptoms.

## 2023-09-21 NOTE — ED TRIAGE NOTES
Runny nose, cough, chest burning with cough, sore throat, onset 9/15/23. Exposure to covid from sister.   Negative home Covid test.

## 2023-09-25 RX ORDER — LIDOCAINE 50 MG/G
PATCH TOPICAL
Qty: 10 PATCH | Refills: 0 | OUTPATIENT
Start: 2023-09-25

## 2023-12-14 ENCOUNTER — HOSPITAL ENCOUNTER (EMERGENCY)
Facility: CLINIC | Age: 33
Discharge: HOME | End: 2023-12-14
Attending: EMERGENCY MEDICINE
Payer: MEDICAID

## 2023-12-14 VITALS
OXYGEN SATURATION: 99 % | BODY MASS INDEX: 38.36 KG/M2 | TEMPERATURE: 97.6 F | HEIGHT: 76 IN | DIASTOLIC BLOOD PRESSURE: 84 MMHG | WEIGHT: 315 LBS | SYSTOLIC BLOOD PRESSURE: 136 MMHG | RESPIRATION RATE: 16 BRPM | HEART RATE: 99 BPM

## 2023-12-14 DIAGNOSIS — G43.109 MIGRAINE WITH AURA AND WITHOUT STATUS MIGRAINOSUS, NOT INTRACTABLE: Primary | ICD-10-CM

## 2023-12-14 LAB
ALBUMIN SERPL-MCNC: 4.6 G/DL (ref 3.5–5.2)
ALBUMIN/GLOB SERPL: 1.8 {RATIO} (ref 1–2.5)
ALP SERPL-CCNC: 102 U/L (ref 40–129)
ALT SERPL-CCNC: 80 U/L (ref 5–41)
ANION GAP SERPL CALCULATED.3IONS-SCNC: 9 MMOL/L (ref 9–17)
AST SERPL-CCNC: 55 U/L
BASOPHILS # BLD: 0.1 K/UL (ref 0–0.2)
BASOPHILS NFR BLD: 1 % (ref 0–2)
BILIRUB SERPL-MCNC: 0.6 MG/DL (ref 0.3–1.2)
BUN SERPL-MCNC: 8 MG/DL (ref 6–20)
CALCIUM SERPL-MCNC: 9.6 MG/DL (ref 8.6–10.4)
CHLORIDE SERPL-SCNC: 105 MMOL/L (ref 98–107)
CO2 SERPL-SCNC: 25 MMOL/L (ref 20–31)
CREAT SERPL-MCNC: 0.9 MG/DL (ref 0.7–1.2)
EOSINOPHIL # BLD: 0.3 K/UL (ref 0–0.4)
EOSINOPHILS RELATIVE PERCENT: 4 % (ref 1–4)
ERYTHROCYTE [DISTWIDTH] IN BLOOD BY AUTOMATED COUNT: 13.2 % (ref 12.5–15.4)
GFR SERPL CREATININE-BSD FRML MDRD: >60 ML/MIN/1.73M2
GLUCOSE SERPL-MCNC: 94 MG/DL (ref 70–99)
HCT VFR BLD AUTO: 45 % (ref 41–53)
HGB BLD-MCNC: 15.5 G/DL (ref 13.5–17.5)
LYMPHOCYTES NFR BLD: 2.8 K/UL (ref 1–4.8)
LYMPHOCYTES RELATIVE PERCENT: 37 % (ref 24–44)
MCH RBC QN AUTO: 29.4 PG (ref 26–34)
MCHC RBC AUTO-ENTMCNC: 34.4 G/DL (ref 31–37)
MCV RBC AUTO: 85.3 FL (ref 80–100)
MONOCYTES NFR BLD: 0.8 K/UL (ref 0.1–1.2)
MONOCYTES NFR BLD: 11 % (ref 2–11)
NEUTROPHILS NFR BLD: 47 % (ref 36–66)
NEUTS SEG NFR BLD: 3.6 K/UL (ref 1.8–7.7)
PLATELET # BLD AUTO: 253 K/UL (ref 140–450)
PMV BLD AUTO: 8.8 FL (ref 6–12)
POTASSIUM SERPL-SCNC: 4.1 MMOL/L (ref 3.7–5.3)
PROT SERPL-MCNC: 7.2 G/DL (ref 6.4–8.3)
RBC # BLD AUTO: 5.27 M/UL (ref 4.5–5.9)
SODIUM SERPL-SCNC: 139 MMOL/L (ref 135–144)
WBC OTHER # BLD: 7.5 K/UL (ref 3.5–11)

## 2023-12-14 PROCEDURE — 2580000003 HC RX 258: Performed by: EMERGENCY MEDICINE

## 2023-12-14 PROCEDURE — 36415 COLL VENOUS BLD VENIPUNCTURE: CPT

## 2023-12-14 PROCEDURE — 96374 THER/PROPH/DIAG INJ IV PUSH: CPT

## 2023-12-14 PROCEDURE — 6360000002 HC RX W HCPCS: Performed by: EMERGENCY MEDICINE

## 2023-12-14 PROCEDURE — 80053 COMPREHEN METABOLIC PANEL: CPT

## 2023-12-14 PROCEDURE — 85025 COMPLETE CBC W/AUTO DIFF WBC: CPT

## 2023-12-14 PROCEDURE — 99284 EMERGENCY DEPT VISIT MOD MDM: CPT

## 2023-12-14 PROCEDURE — 96375 TX/PRO/DX INJ NEW DRUG ADDON: CPT

## 2023-12-14 RX ORDER — 0.9 % SODIUM CHLORIDE 0.9 %
1000 INTRAVENOUS SOLUTION INTRAVENOUS ONCE
Status: COMPLETED | OUTPATIENT
Start: 2023-12-14 | End: 2023-12-14

## 2023-12-14 RX ORDER — KETOROLAC TROMETHAMINE 15 MG/ML
15 INJECTION, SOLUTION INTRAMUSCULAR; INTRAVENOUS ONCE
Status: COMPLETED | OUTPATIENT
Start: 2023-12-14 | End: 2023-12-14

## 2023-12-14 RX ORDER — ONDANSETRON 2 MG/ML
4 INJECTION INTRAMUSCULAR; INTRAVENOUS ONCE
Status: COMPLETED | OUTPATIENT
Start: 2023-12-14 | End: 2023-12-14

## 2023-12-14 RX ADMIN — ONDANSETRON 4 MG: 2 INJECTION INTRAMUSCULAR; INTRAVENOUS at 16:02

## 2023-12-14 RX ADMIN — SODIUM CHLORIDE 1000 ML: 9 INJECTION, SOLUTION INTRAVENOUS at 16:01

## 2023-12-14 RX ADMIN — KETOROLAC TROMETHAMINE 15 MG: 15 INJECTION, SOLUTION INTRAMUSCULAR; INTRAVENOUS at 16:02

## 2023-12-14 ASSESSMENT — PAIN DESCRIPTION - INTENSITY: RATING_2: 8

## 2023-12-14 ASSESSMENT — PAIN DESCRIPTION - FREQUENCY: FREQUENCY: INTERMITTENT

## 2023-12-14 ASSESSMENT — PAIN DESCRIPTION - DESCRIPTORS
DESCRIPTORS_2: ACHING
DESCRIPTORS: STABBING

## 2023-12-14 ASSESSMENT — LIFESTYLE VARIABLES
HOW OFTEN DO YOU HAVE A DRINK CONTAINING ALCOHOL: MONTHLY OR LESS
HOW MANY STANDARD DRINKS CONTAINING ALCOHOL DO YOU HAVE ON A TYPICAL DAY: 1 OR 2

## 2023-12-14 ASSESSMENT — PAIN DESCRIPTION - ONSET: ONSET: GRADUAL

## 2023-12-14 ASSESSMENT — PAIN DESCRIPTION - ORIENTATION
ORIENTATION_2: ANTERIOR
ORIENTATION: LEFT;LOWER

## 2023-12-14 ASSESSMENT — PAIN SCALES - GENERAL: PAINLEVEL_OUTOF10: 7

## 2023-12-14 ASSESSMENT — PAIN DESCRIPTION - LOCATION
LOCATION_2: HEAD
LOCATION: ABDOMEN

## 2023-12-14 ASSESSMENT — PAIN DESCRIPTION - PAIN TYPE: TYPE: ACUTE PAIN

## 2023-12-14 NOTE — DISCHARGE INSTRUCTIONS
Return to the emergency department symptoms worsen or persist.  Follow-up with your family doctor in 1 to 2 days.

## 2023-12-14 NOTE — ED PROVIDER NOTES
102 40 - 129 U/L    ALT 80 (H) 5 - 41 U/L    AST 55 (H) <40 U/L   CBC with Auto Differential   Result Value Ref Range    WBC 7.5 3.5 - 11.0 k/uL    RBC 5.27 4.5 - 5.9 m/uL    Hemoglobin 15.5 13.5 - 17.5 g/dL    Hematocrit 45.0 41 - 53 %    MCV 85.3 80 - 100 fL    MCH 29.4 26 - 34 pg    MCHC 34.4 31 - 37 g/dL    RDW 13.2 12.5 - 15.4 %    Platelets 323 554 - 892 k/uL    MPV 8.8 6.0 - 12.0 fL    Neutrophils % 47 36 - 66 %    Lymphocytes % 37 24 - 44 %    Monocytes % 11 2 - 11 %    Eosinophils % 4 1 - 4 %    Basophils % 1 0 - 2 %    Neutrophils Absolute 3.60 1.8 - 7.7 k/uL    Lymphocytes Absolute 2.80 1.0 - 4.8 k/uL    Monocytes Absolute 0.80 0.1 - 1.2 k/uL    Eosinophils Absolute 0.30 0.0 - 0.4 k/uL    Basophils Absolute 0.10 0.0 - 0.2 k/uL       All other labs were within normal range or not returned as of this dictation. RADIOLOGY:  No orders to display       I have reviewed the disposition diagnosis with the patient and or their family/guardian. I have answered their questions and givendischarge instructions. They voiced understanding of these instructions and did not have any further questions or complaints. PROCEDURES:  Unless otherwise noted below, none     Procedures    FINAL IMPRESSION      1.  Migraine with aura and without status migrainosus, not intractable          DISPOSITION/PLAN   DISPOSITION Decision To Discharge 12/14/2023 04:40:24 PM      PATIENT REFERRED TO:  Deya Bryan, APRN - CNP  [address]  [phone]    Call today        DISCHARGE MEDICATIONS:  Discharge Medication List as of 12/14/2023  4:41 PM             (Please note that portions of this note were completed with a voice recognition program.  Efforts were made to edit the dictations but occasionally words are mis-transcribed.)    Keena Reynoso DO,(electronically signed)  Board Certified Emergency Physician       Keena Reynoso DO  12/14/23 1800

## 2024-01-22 ENCOUNTER — HOSPITAL ENCOUNTER (EMERGENCY)
Facility: CLINIC | Age: 34
Discharge: HOME | End: 2024-01-22
Attending: EMERGENCY MEDICINE
Payer: MEDICAID

## 2024-01-22 ENCOUNTER — APPOINTMENT (OUTPATIENT)
Dept: CT IMAGING | Facility: CLINIC | Age: 34
End: 2024-01-22
Attending: EMERGENCY MEDICINE
Payer: MEDICAID

## 2024-01-22 VITALS
DIASTOLIC BLOOD PRESSURE: 97 MMHG | HEIGHT: 76 IN | SYSTOLIC BLOOD PRESSURE: 171 MMHG | OXYGEN SATURATION: 99 % | HEART RATE: 95 BPM | WEIGHT: 315 LBS | BODY MASS INDEX: 38.36 KG/M2 | TEMPERATURE: 98 F | RESPIRATION RATE: 16 BRPM

## 2024-01-22 DIAGNOSIS — M54.50 ACUTE BILATERAL LOW BACK PAIN WITHOUT SCIATICA: ICD-10-CM

## 2024-01-22 DIAGNOSIS — R35.0 URINARY FREQUENCY: ICD-10-CM

## 2024-01-22 DIAGNOSIS — R10.2 SUPRAPUBIC ABDOMINAL PAIN: Primary | ICD-10-CM

## 2024-01-22 DIAGNOSIS — R11.0 NAUSEA: ICD-10-CM

## 2024-01-22 LAB
ALBUMIN SERPL-MCNC: 4.3 G/DL (ref 3.5–5.2)
ALBUMIN/GLOB SERPL: 1.5 {RATIO} (ref 1–2.5)
ALP SERPL-CCNC: 110 U/L (ref 40–129)
ALT SERPL-CCNC: 63 U/L (ref 5–41)
ANION GAP SERPL CALCULATED.3IONS-SCNC: 11 MMOL/L (ref 9–17)
AST SERPL-CCNC: 34 U/L
BACTERIA URNS QL MICRO: NORMAL
BASOPHILS # BLD: 0 K/UL (ref 0–0.2)
BASOPHILS NFR BLD: 0 % (ref 0–2)
BILIRUB SERPL-MCNC: 0.3 MG/DL (ref 0.3–1.2)
BILIRUB UR QL STRIP: NEGATIVE
BUN SERPL-MCNC: 10 MG/DL (ref 6–20)
CALCIUM SERPL-MCNC: 9.3 MG/DL (ref 8.6–10.4)
CHLORIDE SERPL-SCNC: 105 MMOL/L (ref 98–107)
CLARITY UR: CLEAR
CO2 SERPL-SCNC: 22 MMOL/L (ref 20–31)
COLOR UR: YELLOW
CREAT SERPL-MCNC: 1.1 MG/DL (ref 0.7–1.2)
EOSINOPHIL # BLD: 0.2 K/UL (ref 0–0.4)
EOSINOPHILS RELATIVE PERCENT: 2 % (ref 1–4)
EPI CELLS #/AREA URNS HPF: NORMAL /HPF (ref 0–5)
ERYTHROCYTE [DISTWIDTH] IN BLOOD BY AUTOMATED COUNT: 13.6 % (ref 12.5–15.4)
GFR SERPL CREATININE-BSD FRML MDRD: >60 ML/MIN/1.73M2
GLUCOSE SERPL-MCNC: 102 MG/DL (ref 70–99)
GLUCOSE UR STRIP-MCNC: NEGATIVE MG/DL
HCT VFR BLD AUTO: 45.1 % (ref 41–53)
HGB BLD-MCNC: 15.7 G/DL (ref 13.5–17.5)
HGB UR QL STRIP.AUTO: NEGATIVE
KETONES UR STRIP-MCNC: NEGATIVE MG/DL
LEUKOCYTE ESTERASE UR QL STRIP: NEGATIVE
LIPASE SERPL-CCNC: 66 U/L (ref 13–60)
LYMPHOCYTES NFR BLD: 3.1 K/UL (ref 1–4.8)
LYMPHOCYTES RELATIVE PERCENT: 34 % (ref 24–44)
MCH RBC QN AUTO: 29.7 PG (ref 26–34)
MCHC RBC AUTO-ENTMCNC: 34.8 G/DL (ref 31–37)
MCV RBC AUTO: 85.4 FL (ref 80–100)
MONOCYTES NFR BLD: 0.7 K/UL (ref 0.1–1.2)
MONOCYTES NFR BLD: 8 % (ref 2–11)
NEUTROPHILS NFR BLD: 56 % (ref 36–66)
NEUTS SEG NFR BLD: 5.2 K/UL (ref 1.8–7.7)
NITRITE UR QL STRIP: NEGATIVE
PH UR STRIP: 5.5 [PH] (ref 5–8)
PLATELET # BLD AUTO: 240 K/UL (ref 140–450)
PMV BLD AUTO: 8.7 FL (ref 6–12)
POTASSIUM SERPL-SCNC: 3.8 MMOL/L (ref 3.7–5.3)
PROT SERPL-MCNC: 7.2 G/DL (ref 6.4–8.3)
PROT UR STRIP-MCNC: NEGATIVE MG/DL
RBC # BLD AUTO: 5.27 M/UL (ref 4.5–5.9)
RBC #/AREA URNS HPF: NORMAL /HPF (ref 0–2)
SODIUM SERPL-SCNC: 138 MMOL/L (ref 135–144)
SP GR UR STRIP: 1.02 (ref 1–1.03)
UROBILINOGEN UR STRIP-ACNC: NORMAL EU/DL (ref 0–1)
WBC #/AREA URNS HPF: NORMAL /HPF (ref 0–5)
WBC OTHER # BLD: 9.3 K/UL (ref 3.5–11)

## 2024-01-22 PROCEDURE — 99284 EMERGENCY DEPT VISIT MOD MDM: CPT

## 2024-01-22 PROCEDURE — 74176 CT ABD & PELVIS W/O CONTRAST: CPT

## 2024-01-22 PROCEDURE — 2500000003 HC RX 250 WO HCPCS: Performed by: EMERGENCY MEDICINE

## 2024-01-22 PROCEDURE — 83690 ASSAY OF LIPASE: CPT

## 2024-01-22 PROCEDURE — A4216 STERILE WATER/SALINE, 10 ML: HCPCS | Performed by: EMERGENCY MEDICINE

## 2024-01-22 PROCEDURE — 96374 THER/PROPH/DIAG INJ IV PUSH: CPT

## 2024-01-22 PROCEDURE — 80053 COMPREHEN METABOLIC PANEL: CPT

## 2024-01-22 PROCEDURE — 87491 CHLMYD TRACH DNA AMP PROBE: CPT

## 2024-01-22 PROCEDURE — 85025 COMPLETE CBC W/AUTO DIFF WBC: CPT

## 2024-01-22 PROCEDURE — 2580000003 HC RX 258: Performed by: EMERGENCY MEDICINE

## 2024-01-22 PROCEDURE — 96375 TX/PRO/DX INJ NEW DRUG ADDON: CPT

## 2024-01-22 PROCEDURE — 6360000002 HC RX W HCPCS: Performed by: EMERGENCY MEDICINE

## 2024-01-22 PROCEDURE — 81001 URINALYSIS AUTO W/SCOPE: CPT

## 2024-01-22 PROCEDURE — 87591 N.GONORRHOEAE DNA AMP PROB: CPT

## 2024-01-22 RX ORDER — MORPHINE SULFATE 4 MG/ML
4 INJECTION, SOLUTION INTRAMUSCULAR; INTRAVENOUS ONCE
Status: COMPLETED | OUTPATIENT
Start: 2024-01-22 | End: 2024-01-22

## 2024-01-22 RX ORDER — ONDANSETRON 2 MG/ML
4 INJECTION INTRAMUSCULAR; INTRAVENOUS ONCE
Status: COMPLETED | OUTPATIENT
Start: 2024-01-22 | End: 2024-01-22

## 2024-01-22 RX ORDER — HYDROCODONE BITARTRATE AND ACETAMINOPHEN 5; 325 MG/1; MG/1
1 TABLET ORAL EVERY 6 HOURS PRN
Qty: 12 TABLET | Refills: 0 | Status: SHIPPED | OUTPATIENT
Start: 2024-01-22 | End: 2024-01-25

## 2024-01-22 RX ORDER — 0.9 % SODIUM CHLORIDE 0.9 %
1000 INTRAVENOUS SOLUTION INTRAVENOUS ONCE
Status: COMPLETED | OUTPATIENT
Start: 2024-01-22 | End: 2024-01-22

## 2024-01-22 RX ADMIN — ONDANSETRON 4 MG: 2 INJECTION INTRAMUSCULAR; INTRAVENOUS at 18:53

## 2024-01-22 RX ADMIN — MORPHINE SULFATE 4 MG: 4 INJECTION, SOLUTION INTRAMUSCULAR; INTRAVENOUS at 18:54

## 2024-01-22 RX ADMIN — FAMOTIDINE 20 MG: 10 INJECTION, SOLUTION INTRAVENOUS at 18:53

## 2024-01-22 RX ADMIN — SODIUM CHLORIDE 1000 ML: 9 INJECTION, SOLUTION INTRAVENOUS at 18:52

## 2024-01-22 ASSESSMENT — ENCOUNTER SYMPTOMS
ABDOMINAL PAIN: 1
DIARRHEA: 1
VOMITING: 0
COLOR CHANGE: 0
BACK PAIN: 1

## 2024-01-22 NOTE — ED PROVIDER NOTES
[facility]  [address] [address]  Phone: [phone]       Pt Name: Maximilian Benton  MRN: [id number]  Birthdate 1990  Date of evaluation: 1/22/24  PCP:  Juliane Feliciano, EDWARD - CNP    CHIEF COMPLAINT       Chief Complaint   Patient presents with    Abdominal Pain     Low abdominal pain. Patient states he has has gastric bypass       HISTORY OF PRESENT ILLNESS  (Location/Symptom, Timing/Onset, Context/Setting, Quality, Duration, Modifying Factors, Severity.)    Maximilian Benton is a 33 y.o. male who presents with suprapubic abdominal pain, urinary urgency frequency with an orange appearance, bilateral low back pain, nausea.  States that he also has nonbloody diarrhea, however this is a chronic issue that he has had since he had gastric bypass surgery.  States that he is currently getting off of a course of Augmentin after having failed a course of clindamycin that he was on for URI-like symptoms for the past month.  He does state that this abdominal pain radiates into his testicles bilaterally but has not noticed any testicular pain or swelling.  Denies any penile discharge and denies any concern for STD.    PAST MEDICAL / SURGICAL / SOCIAL / FAMILY HISTORY    has a past medical history of Anxiety, Asthma, Bipolar disorder (HCC), Depression, GERD (gastroesophageal reflux disease), Headache, Hypertension, Hypothyroidism, Insomnia, and Unspecified sleep apnea.     has a past surgical history that includes [place] tooth extraction; Upper gastrointestinal endoscopy; Finger trigger release; Tonsillectomy; and Gastric bypass surgery.    Social History     Socioeconomic History    Marital status: Single     Spouse name: Not on file    Number of children: Not on file    Years of education: Not on file    Highest education level: Not on file   Occupational History    Not on file   Tobacco Use    Smoking status: Every Day     Current packs/day: 0.50     Types: Cigarettes    Smokeless

## 2024-01-23 LAB
CHLAMYDIA DNA UR QL NAA+PROBE: NEGATIVE
N GONORRHOEA DNA UR QL NAA+PROBE: NEGATIVE
SPECIMEN DESCRIPTION: NORMAL

## 2024-01-23 NOTE — ED PROVIDER NOTES
[facility]  [address] [address]  Phone: [phone]        Pt Name: Maximilian Benton  MRN: [id number]  Birthdate 1990  Date of evaluation: 1/22/24      CHIEF COMPLAINT     Chief Complaint   Patient presents with    Abdominal Pain     Low abdominal pain. Patient states he has has gastric bypass         HISTORY OF PRESENT ILLNESS  (Location/Symptom, Timing/Onset, Context/Setting, Quality, Duration, Modifying Factors, Severity.)    Maximilian Benton is a 33 y.o. male who presents with suprapubic abdominal pain, urinary frequency and bilateral low back pain.  I resumed care of this patient from Dr. Regine Velasquez to follow-up on the CT results and the lab results.  Patient does have a history of obesity and has had gastric bypass surgery.  He states his urine has been dark in color.  He also stated that his semen had been yellow.  He denies any abnormal penile discharge or testicular pain or swelling.  He was recently on Augmentin and she failed a course of clindamycin for a URI.      REVIEW OF SYSTEMS    (2-9 systems for level 4, 10 or more for level 5)     Review of Systems refer to Dr. Haas's chart for full review of systems    PAST MEDICAL HISTORY    has a past medical history of Anxiety, Asthma, Bipolar disorder (HCC), Depression, GERD (gastroesophageal reflux disease), Headache, Hypertension, Hypothyroidism, Insomnia, and Unspecified sleep apnea.    SURGICAL HISTORY      has a past surgical history that includes [place] tooth extraction; Upper gastrointestinal endoscopy; Finger trigger release; Tonsillectomy; and Gastric bypass surgery.    CURRENTMEDICATIONS       Previous Medications    ACETAMINOPHEN (TYLENOL) 500 MG TABLET    Take 2 tablets by mouth every 6 hours as needed for Pain    ACETAMINOPHEN (TYLENOL) 500 MG TABLET    Take 2 tablets by mouth 3 times daily as needed for Pain    ALPRAZOLAM (XANAX) 0.5 MG TABLET    Take 2 tablets by mouth nightly as needed for Sleep.

## 2024-02-13 ENCOUNTER — HOSPITAL ENCOUNTER (EMERGENCY)
Facility: CLINIC | Age: 34
Discharge: HOME | End: 2024-02-13
Attending: SPECIALIST
Payer: MEDICAID

## 2024-02-13 VITALS
BODY MASS INDEX: 38.36 KG/M2 | DIASTOLIC BLOOD PRESSURE: 85 MMHG | SYSTOLIC BLOOD PRESSURE: 140 MMHG | OXYGEN SATURATION: 98 % | WEIGHT: 315 LBS | HEART RATE: 90 BPM | TEMPERATURE: 97.8 F | HEIGHT: 76 IN | RESPIRATION RATE: 20 BRPM

## 2024-02-13 DIAGNOSIS — K08.89 DENTALGIA: Primary | ICD-10-CM

## 2024-02-13 PROCEDURE — 99283 EMERGENCY DEPT VISIT LOW MDM: CPT

## 2024-02-13 PROCEDURE — 6370000000 HC RX 637 (ALT 250 FOR IP): Performed by: REGISTERED NURSE

## 2024-02-13 RX ORDER — METOPROLOL SUCCINATE 25 MG/1
25 TABLET, EXTENDED RELEASE ORAL DAILY
COMMUNITY

## 2024-02-13 RX ORDER — HYDROCODONE BITARTRATE AND ACETAMINOPHEN 5; 325 MG/1; MG/1
1 TABLET ORAL EVERY 8 HOURS PRN
Qty: 6 TABLET | Refills: 0 | Status: SHIPPED | OUTPATIENT
Start: 2024-02-13 | End: 2024-02-15

## 2024-02-13 RX ORDER — HYDROCODONE BITARTRATE AND ACETAMINOPHEN 5; 325 MG/1; MG/1
1 TABLET ORAL ONCE
Status: COMPLETED | OUTPATIENT
Start: 2024-02-13 | End: 2024-02-13

## 2024-02-13 RX ADMIN — HYDROCODONE BITARTRATE AND ACETAMINOPHEN 1 TABLET: 5; 325 TABLET ORAL at 22:05

## 2024-02-14 NOTE — ED NOTES
Pt. Ambulatory to room # 6 from waiting room, gait steady. Pt. States he had a tooth extraction 5 days ago and continues to have pain and emesis x2. Pt. States he called his oral surgeon and was told to go to the ED. Pt. Also states he is now having some bleeding to his mouth, which just started in the room. Pt. Is not on any antibiotic. Pt. Alert and oriented x4. RR equal and non labored. NAD noted. Call light within reach.

## 2024-02-14 NOTE — ED PROVIDER NOTES
[facility]  [address] [facility] [address]  Phone: [phone]        Pt Name: Maximilian Benton  MRN: [id number]  Birthdate 1990  Date of evaluation: 2/13/24    CHIEFCOMPLAINT       Chief Complaint   Patient presents with    Dental Pain     Right upper had tooth extraction 5 days ago now has emesis x2 today and increased tooth pain       HISTORY OF PRESENT ILLNESS (Location/Symptom, Timing/Onset, Context/Setting, Quality, Duration, Modifying Factors, Severity)      Maximilian Benton is a 33 y.o. male with no pertinent PMH who presents to the ED via private auto with dental pain ongoing for last 5 days.  Patient had a tooth extracted 5 days ago, started having worsening pain over the last 24 hours.  He states he has vomited twice due to it, has taken his Phenergan that he had since his gastric bypass with some improvement in symptoms.  He denies any fevers, chills, chest pain or shortness of breath.  He has taken Tylenol as well with no relief of symptoms.  He did contact his dental surgeon and was instructed come to the ER to be evaluated.  On arrival he is resting on the cot with even unlabored breaths nontoxic-appearing no acute distress noted.    PAST MEDICAL / SURGICAL / SOCIAL / FAMILY HISTORY     PMH:  has a past medical history of Anxiety, Asthma, Bipolar disorder (HCC), Depression, GERD (gastroesophageal reflux disease), Headache, Hypertension, Hypothyroidism, Insomnia, and Unspecified sleep apnea.  Surgical History:  has a past surgical history that includes [place] tooth extraction; Upper gastrointestinal endoscopy; Finger trigger release; Tonsillectomy; and Gastric bypass surgery.  Social History:  reports that he has been smoking cigarettes. He has never used smokeless tobacco. He reports current alcohol use. He reports that he does not use drugs.  Family History: is adopted.    family history is not on file. He was adopted.  Psychiatric History: None    Allergies: Chantix

## 2024-02-14 NOTE — ED PROVIDER NOTES
[facility]  Emergency Department  Faculty Attestation     Pt Name: Maximilian Benton  MRN: [id number]  Birthdate 1990  Date of evaluation: 2/13/24    I was personally available for consultation in the Emergency Department. Have reviewed everything on the chart that is available and agree with the documentation provided by the MLP, including discussion about the assessment, treatment plan and disposition.    Maximilian Benton is a 33 y.o. male who presents with Dental Pain (Right upper had tooth extraction 5 days ago now has emesis x2 today and increased tooth pain)      Vitals:   Vitals:    02/13/24 2134   BP: (!) 140/85   Pulse: 90   Resp: 20   Temp: 97.8 °F (36.6 °C)   TempSrc: Oral   SpO2: 98%   Weight: (!) 171 kg (377 lb)   Height: 1.93 m (6' 4\")       Impression:   1. Dentalgia          Sachin Kennedy MD  02/13/24 0829

## 2024-05-31 ENCOUNTER — HOSPITAL ENCOUNTER (EMERGENCY)
Facility: CLINIC | Age: 34
Discharge: HOME | End: 2024-05-31
Attending: EMERGENCY MEDICINE
Payer: MEDICAID

## 2024-05-31 VITALS
DIASTOLIC BLOOD PRESSURE: 98 MMHG | HEIGHT: 76 IN | HEART RATE: 86 BPM | RESPIRATION RATE: 17 BRPM | OXYGEN SATURATION: 97 % | WEIGHT: 315 LBS | SYSTOLIC BLOOD PRESSURE: 153 MMHG | BODY MASS INDEX: 38.36 KG/M2 | TEMPERATURE: 97.5 F

## 2024-05-31 DIAGNOSIS — Z51.89 VISIT FOR WOUND CHECK: Primary | ICD-10-CM

## 2024-05-31 PROCEDURE — 99282 EMERGENCY DEPT VISIT SF MDM: CPT

## 2024-05-31 RX ORDER — GINSENG 100 MG
CAPSULE ORAL 3 TIMES DAILY
Status: DISCONTINUED | OUTPATIENT
Start: 2024-05-31 | End: 2024-05-31 | Stop reason: HOSPADM

## 2024-05-31 RX ORDER — PHENTERMINE HYDROCHLORIDE 37.5 MG/1
37.5 CAPSULE ORAL EVERY MORNING
COMMUNITY

## 2024-05-31 ASSESSMENT — PAIN DESCRIPTION - LOCATION: LOCATION: HAND

## 2024-05-31 ASSESSMENT — PAIN SCALES - GENERAL: PAINLEVEL_OUTOF10: 8

## 2024-05-31 ASSESSMENT — PAIN DESCRIPTION - PAIN TYPE: TYPE: ACUTE PAIN

## 2024-05-31 ASSESSMENT — PAIN DESCRIPTION - ORIENTATION: ORIENTATION: LEFT

## 2024-05-31 ASSESSMENT — PAIN - FUNCTIONAL ASSESSMENT: PAIN_FUNCTIONAL_ASSESSMENT: 0-10

## 2024-05-31 ASSESSMENT — PAIN DESCRIPTION - FREQUENCY: FREQUENCY: CONTINUOUS

## 2024-05-31 ASSESSMENT — PAIN DESCRIPTION - DESCRIPTORS: DESCRIPTORS: BURNING

## 2024-05-31 NOTE — DISCHARGE INSTRUCTIONS
No obvious signs of infection are noted were going to clean her wound well but put some bacitracin and Band-Aid on it he will need to follow-up with the orthopedist who did your operation tomorrow.  Return back to an ER if worse

## 2024-05-31 NOTE — ED NOTES
Left hand clarke aspect cleansed with Hibiclens/saline, Bacitracin applied, dry sterile dressing applied, pt tolerated well

## 2024-05-31 NOTE — ED PROVIDER NOTES
indicated unless otherwise documented above    RADIOLOGY:   I reviewed the radiologist interpretations:  No orders to display       Not indicated unless otherwise documented above    EMERGENCY DEPARTMENT COURSE:     The patient was given the following medications:  Orders Placed This Encounter   Medications    bacitracin ointment        Vitals:    Vitals:    05/31/24 0024   BP: (!) 153/98   Pulse: 86   Resp: 17   Temp: 97.5 °F (36.4 °C)   TempSrc: Oral   SpO2: 97%   Weight: (!) 165.6 kg (365 lb)   Height: 1.93 m (6' 4\")     -------------------------  BP (!) 153/98   Pulse 86   Temp 97.5 °F (36.4 °C) (Oral)   Resp 17   Ht 1.93 m (6' 4\")   Wt (!) 165.6 kg (365 lb)   SpO2 97%   BMI 44.43 kg/m²         I have reviewed the disposition diagnosis with the patient and or their family/guardian. I have answered their questions and given discharge instructions. They voiced understanding of these instructions and did not have any further questions or complaints.    CRITICAL CARE:    None    CONSULTS:    None    PROCEDURES:    None      OARRS Report if indicated             FINAL IMPRESSION      1. Visit for wound check          DISPOSITION/PLAN   DISPOSITION Decision To Discharge    I have reviewed the disposition diagnosis with the patient and or their family/guardian.  I have answered their questions and given discharge instructions.  They voiced understanding of these instructions and did not have any further questions or complaints.      Reevaluation: Patient has an injury to on operative side that does not require any gross intervention on our part review to clean this up with some bacitracin a Band-Aid on it and have him follow-up with his own doctors in the morning      PATIENT REFERRED TO:  Basilio Leblanc MD  [facility] [address]  [phone]    In 1 day        DISCHARGE MEDICATIONS:  New Prescriptions    No medications on file       (Please note that portions of this note were completed with

## 2024-07-23 ENCOUNTER — HOSPITAL ENCOUNTER (EMERGENCY)
Facility: CLINIC | Age: 34
Discharge: HOME | End: 2024-07-23
Attending: EMERGENCY MEDICINE
Payer: MEDICAID

## 2024-07-23 VITALS
OXYGEN SATURATION: 96 % | TEMPERATURE: 97.7 F | WEIGHT: 315 LBS | DIASTOLIC BLOOD PRESSURE: 93 MMHG | BODY MASS INDEX: 38.36 KG/M2 | RESPIRATION RATE: 20 BRPM | SYSTOLIC BLOOD PRESSURE: 151 MMHG | HEIGHT: 76 IN | HEART RATE: 106 BPM

## 2024-07-23 DIAGNOSIS — M54.50 ACUTE EXACERBATION OF CHRONIC LOW BACK PAIN: Primary | ICD-10-CM

## 2024-07-23 DIAGNOSIS — G89.29 ACUTE EXACERBATION OF CHRONIC LOW BACK PAIN: Primary | ICD-10-CM

## 2024-07-23 PROCEDURE — 99284 EMERGENCY DEPT VISIT MOD MDM: CPT

## 2024-07-23 PROCEDURE — 6360000002 HC RX W HCPCS: Performed by: EMERGENCY MEDICINE

## 2024-07-23 PROCEDURE — 96372 THER/PROPH/DIAG INJ SC/IM: CPT

## 2024-07-23 RX ORDER — DEXAMETHASONE SODIUM PHOSPHATE 10 MG/ML
6 INJECTION, SOLUTION INTRAMUSCULAR; INTRAVENOUS ONCE
Status: COMPLETED | OUTPATIENT
Start: 2024-07-23 | End: 2024-07-23

## 2024-07-23 RX ORDER — HYDROCODONE BITARTRATE AND ACETAMINOPHEN 5; 325 MG/1; MG/1
1 TABLET ORAL EVERY 6 HOURS PRN
Qty: 12 TABLET | Refills: 0 | Status: SHIPPED | OUTPATIENT
Start: 2024-07-23 | End: 2024-07-26

## 2024-07-23 RX ORDER — PREDNISONE 20 MG/1
20 TABLET ORAL 2 TIMES DAILY
Qty: 20 TABLET | Refills: 0 | Status: SHIPPED | OUTPATIENT
Start: 2024-07-23 | End: 2024-08-02

## 2024-07-23 RX ORDER — ZOLPIDEM TARTRATE 12.5 MG/1
12.5 TABLET, FILM COATED, EXTENDED RELEASE ORAL NIGHTLY PRN
COMMUNITY

## 2024-07-23 RX ORDER — HYDROXYZINE PAMOATE 50 MG/1
50 CAPSULE ORAL 4 TIMES DAILY PRN
COMMUNITY

## 2024-07-23 RX ADMIN — DEXAMETHASONE SODIUM PHOSPHATE 6 MG: 10 INJECTION, SOLUTION INTRAMUSCULAR; INTRAVENOUS at 04:07

## 2024-07-23 ASSESSMENT — ENCOUNTER SYMPTOMS
SORE THROAT: 0
EYE REDNESS: 0
DIARRHEA: 0
COUGH: 0
EYE PAIN: 0
SHORTNESS OF BREATH: 0
VOMITING: 0
NAUSEA: 0
BACK PAIN: 1
WHEEZING: 0
ABDOMINAL PAIN: 0
COLOR CHANGE: 0
EYE DISCHARGE: 0
CONSTIPATION: 0
STRIDOR: 0

## 2024-07-23 NOTE — ED PROVIDER NOTES
[facility]  EMERGENCY DEPARTMENT ENCOUNTER      Pt Name: Maximilian Benton  MRN: [id number]  Birthdate 1990  Date of evaluation: 2024  Provider: Mayela Ceja MD    CHIEF COMPLAINT       Chief Complaint   Patient presents with    Back Pain     Back pain -chronic, pain restarted 2 weeks ago, mid to lower back      HISTORY OF PRESENT ILLNESS  (Location/Symptom, Timing/Onset, Context/Setting, Quality, Duration, Modifying Factors, Severity.)   Maximilian Benton is a 34 y.o. male who presents to the emergency department complaining of chronic back pain.  But he has had some recent acute pain that restarted 2 weeks ago.  He said it is mid to lower back.  With that he had some frequency and he was seen on  in the emergency department.  He relates they did some imaging and lab work as well as a urine sample and really nothing was found at the time other than a large amount of fecal material.  He relates he thinks a lot of the issue is stress and he has made some effort to try to get stress improved.  However his aunt recently passed away and the  is Wednesday.  He says he is taking gabapentin and Ambien and has not really helping his sleep and that is why he is here in the middle of the night right now.  He is following with a chiropractor who recommended some prednisone and pain medicine.  He was told by the same chiropractor that he should get to a specialist who could write him for these things and until then his family doctor could write for them.  However he has been discharged from his family doctor and he said he is not exactly sure why.  When he called them to ask about this they told him they sent him a letter but he tells me he is not even able to see this letter so he is not really sure why.  He thinks that there could be a lot of stress.  He has been using Tylenol.  Due to his gastric bypass he stays away from anti-inflammatories.    Nursing Notes were

## 2024-07-28 ENCOUNTER — HOSPITAL ENCOUNTER (EMERGENCY)
Facility: CLINIC | Age: 34
Discharge: HOME | End: 2024-07-28
Attending: STUDENT IN AN ORGANIZED HEALTH CARE EDUCATION/TRAINING PROGRAM
Payer: MEDICAID

## 2024-07-28 VITALS
DIASTOLIC BLOOD PRESSURE: 80 MMHG | TEMPERATURE: 97.7 F | SYSTOLIC BLOOD PRESSURE: 125 MMHG | HEART RATE: 86 BPM | HEIGHT: 76 IN | RESPIRATION RATE: 16 BRPM | BODY MASS INDEX: 38.36 KG/M2 | OXYGEN SATURATION: 97 % | WEIGHT: 315 LBS

## 2024-07-28 DIAGNOSIS — R30.0 DYSURIA: Primary | ICD-10-CM

## 2024-07-28 LAB
BILIRUB UR QL STRIP: NEGATIVE
CLARITY UR: CLEAR
COLOR UR: YELLOW
COMMENT: NORMAL
GLUCOSE UR STRIP-MCNC: NEGATIVE MG/DL
HGB UR QL STRIP.AUTO: NEGATIVE
KETONES UR STRIP-MCNC: NEGATIVE MG/DL
LEUKOCYTE ESTERASE UR QL STRIP: NEGATIVE
NITRITE UR QL STRIP: NEGATIVE
PH UR STRIP: 6 [PH] (ref 5–8)
PROT UR STRIP-MCNC: NEGATIVE MG/DL
SP GR UR STRIP: 1.02 (ref 1–1.03)
UROBILINOGEN UR STRIP-ACNC: NORMAL EU/DL (ref 0–1)

## 2024-07-28 PROCEDURE — 51798 US URINE CAPACITY MEASURE: CPT

## 2024-07-28 PROCEDURE — 87591 N.GONORRHOEAE DNA AMP PROB: CPT

## 2024-07-28 PROCEDURE — 6370000000 HC RX 637 (ALT 250 FOR IP): Performed by: STUDENT IN AN ORGANIZED HEALTH CARE EDUCATION/TRAINING PROGRAM

## 2024-07-28 PROCEDURE — 99283 EMERGENCY DEPT VISIT LOW MDM: CPT

## 2024-07-28 PROCEDURE — 87491 CHLMYD TRACH DNA AMP PROBE: CPT

## 2024-07-28 PROCEDURE — 81003 URINALYSIS AUTO W/O SCOPE: CPT

## 2024-07-28 RX ORDER — PHENAZOPYRIDINE HYDROCHLORIDE 200 MG/1
200 TABLET, FILM COATED ORAL 3 TIMES DAILY PRN
Qty: 15 TABLET | Refills: 0 | Status: SHIPPED | OUTPATIENT
Start: 2024-07-28

## 2024-07-28 RX ORDER — PHENAZOPYRIDINE HYDROCHLORIDE 100 MG/1
200 TABLET, FILM COATED ORAL ONCE
Status: COMPLETED | OUTPATIENT
Start: 2024-07-28 | End: 2024-07-28

## 2024-07-28 RX ORDER — PHENAZOPYRIDINE HYDROCHLORIDE 200 MG/1
200 TABLET, FILM COATED ORAL 3 TIMES DAILY PRN
Qty: 15 TABLET | Refills: 0 | Status: SHIPPED | OUTPATIENT
Start: 2024-07-28 | End: 2024-07-28

## 2024-07-28 RX ADMIN — PHENAZOPYRIDINE 200 MG: 100 TABLET ORAL at 03:53

## 2024-07-28 NOTE — ED TRIAGE NOTES
Arrives per private auto with sister  C/O burning with urination and urinary incontinece for 2-3 days  Denies fevers, no hematuria   Denies injury

## 2024-07-28 NOTE — DISCHARGE INSTRUCTIONS
Please follow-up with urology as soon as possible for further evaluation.  You may use medication as prescribed for your symptoms.

## 2024-07-28 NOTE — ED PROVIDER NOTES
[facility]  EMERGENCY DEPARTMENT ENCOUNTER      Pt Name: Maximilian Benton  MRN: [id number]  Birthdate 1990  Date of evaluation: 7/28/2024  Provider: Jimmy Paz DO    CHIEF COMPLAINT     Dysuria and urinary urgency      HISTORY OF PRESENT ILLNESS   (Location/Symptom, Timing/Onset, Context/Setting, Quality, Duration, Modifying Factors, Severity)  Note limiting factors.   Maximilian Benton is a 34 y.o. male who presents to the emergency department with urinary urgency and dysuria.  Patient states that for the past week or so he has been having burning with urination as well as severe urinary frequency and urgency to the point that he accidentally urinated on himself tonight.  He notes that he does not always feel like he completely empties his bladder.  He was seen for the symptoms at a different emergency department yesterday and had a normal urinalysis and normal bladder scan.  He notes that he does have an appointment made with urology however he is concerned because his symptoms are worsening in severity.    HPI    Nursing Notes were reviewed.    REVIEW OF SYSTEMS    (2-9 systems for level 4, 10 or more for level 5)     Review of Systems   Constitutional:  Negative for chills and fever.   HENT: Negative.     Respiratory:  Negative for cough and shortness of breath.    Cardiovascular:  Negative for chest pain and palpitations.   Gastrointestinal:  Negative for abdominal pain, nausea and vomiting.   Genitourinary:  Positive for dysuria, frequency and urgency. Negative for flank pain, genital sores and testicular pain.   Skin:  Negative for rash and wound.   Neurological:  Negative for weakness, numbness and headaches.       Except as noted above the remainder of the review of systems was reviewed and negative.       PAST MEDICAL HISTORY     Past Medical History:   Diagnosis Date    Anxiety     Asthma     Bipolar disorder (HCC)     Depression     GERD (gastroesophageal reflux disease)     Headache

## 2024-09-14 ENCOUNTER — HOSPITAL ENCOUNTER (EMERGENCY)
Facility: CLINIC | Age: 34
Discharge: HOME | End: 2024-09-14
Attending: STUDENT IN AN ORGANIZED HEALTH CARE EDUCATION/TRAINING PROGRAM
Payer: MEDICAID

## 2024-09-14 VITALS
OXYGEN SATURATION: 94 % | HEIGHT: 76 IN | RESPIRATION RATE: 16 BRPM | WEIGHT: 315 LBS | TEMPERATURE: 97.3 F | SYSTOLIC BLOOD PRESSURE: 146 MMHG | HEART RATE: 96 BPM | BODY MASS INDEX: 38.36 KG/M2 | DIASTOLIC BLOOD PRESSURE: 94 MMHG

## 2024-09-14 DIAGNOSIS — S02.5XXA CLOSED FRACTURE OF TOOTH, INITIAL ENCOUNTER: ICD-10-CM

## 2024-09-14 DIAGNOSIS — K08.89 DENTALGIA: Primary | ICD-10-CM

## 2024-09-14 PROCEDURE — 99284 EMERGENCY DEPT VISIT MOD MDM: CPT

## 2024-09-14 PROCEDURE — 96372 THER/PROPH/DIAG INJ SC/IM: CPT

## 2024-09-14 PROCEDURE — 6360000002 HC RX W HCPCS: Performed by: STUDENT IN AN ORGANIZED HEALTH CARE EDUCATION/TRAINING PROGRAM

## 2024-09-14 RX ORDER — OXYCODONE HYDROCHLORIDE 5 MG/1
5 TABLET ORAL EVERY 8 HOURS PRN
Qty: 9 TABLET | Refills: 0 | Status: SHIPPED | OUTPATIENT
Start: 2024-09-14 | End: 2024-09-17

## 2024-09-14 RX ORDER — KETOROLAC TROMETHAMINE 30 MG/ML
30 INJECTION, SOLUTION INTRAMUSCULAR; INTRAVENOUS ONCE
Status: COMPLETED | OUTPATIENT
Start: 2024-09-14 | End: 2024-09-14

## 2024-09-14 RX ADMIN — KETOROLAC TROMETHAMINE 30 MG: 30 INJECTION, SOLUTION INTRAMUSCULAR at 21:39

## 2024-09-14 ASSESSMENT — PAIN DESCRIPTION - DESCRIPTORS
DESCRIPTORS: THROBBING;SHARP
DESCRIPTORS: THROBBING;SHARP

## 2024-09-14 ASSESSMENT — PAIN DESCRIPTION - LOCATION
LOCATION: TEETH
LOCATION: TEETH

## 2024-09-14 ASSESSMENT — PAIN - FUNCTIONAL ASSESSMENT: PAIN_FUNCTIONAL_ASSESSMENT: 0-10

## 2024-09-14 ASSESSMENT — PAIN SCALES - GENERAL
PAINLEVEL_OUTOF10: 8
PAINLEVEL_OUTOF10: 8

## 2024-09-28 ENCOUNTER — HOSPITAL ENCOUNTER (EMERGENCY)
Facility: CLINIC | Age: 34
Discharge: HOME | End: 2024-09-28
Attending: EMERGENCY MEDICINE
Payer: MEDICAID

## 2024-09-28 VITALS
WEIGHT: 315 LBS | BODY MASS INDEX: 38.36 KG/M2 | DIASTOLIC BLOOD PRESSURE: 88 MMHG | OXYGEN SATURATION: 98 % | SYSTOLIC BLOOD PRESSURE: 136 MMHG | HEART RATE: 87 BPM | RESPIRATION RATE: 16 BRPM | TEMPERATURE: 98.3 F | HEIGHT: 76 IN

## 2024-09-28 DIAGNOSIS — R10.2 SUPRAPUBIC PAIN: ICD-10-CM

## 2024-09-28 DIAGNOSIS — K08.89 PAIN, DENTAL: Primary | ICD-10-CM

## 2024-09-28 LAB
BILIRUB UR QL STRIP: NEGATIVE
CLARITY UR: CLEAR
COLOR UR: YELLOW
COMMENT: NORMAL
GLUCOSE UR STRIP-MCNC: NEGATIVE MG/DL
HGB UR QL STRIP.AUTO: NEGATIVE
KETONES UR STRIP-MCNC: NEGATIVE MG/DL
LEUKOCYTE ESTERASE UR QL STRIP: NEGATIVE
NITRITE UR QL STRIP: NEGATIVE
PH UR STRIP: 7 [PH] (ref 5–8)
PROT UR STRIP-MCNC: NEGATIVE MG/DL
SP GR UR STRIP: 1.02 (ref 1–1.03)
UROBILINOGEN UR STRIP-ACNC: NORMAL EU/DL (ref 0–1)

## 2024-09-28 PROCEDURE — 99284 EMERGENCY DEPT VISIT MOD MDM: CPT

## 2024-09-28 PROCEDURE — 6370000000 HC RX 637 (ALT 250 FOR IP): Performed by: EMERGENCY MEDICINE

## 2024-09-28 PROCEDURE — 96372 THER/PROPH/DIAG INJ SC/IM: CPT

## 2024-09-28 PROCEDURE — 81003 URINALYSIS AUTO W/O SCOPE: CPT

## 2024-09-28 PROCEDURE — 6360000002 HC RX W HCPCS: Performed by: EMERGENCY MEDICINE

## 2024-09-28 RX ORDER — OXYCODONE AND ACETAMINOPHEN 5; 325 MG/1; MG/1
1 TABLET ORAL ONCE
Status: COMPLETED | OUTPATIENT
Start: 2024-09-28 | End: 2024-09-28

## 2024-09-28 RX ORDER — KETOROLAC TROMETHAMINE 30 MG/ML
30 INJECTION, SOLUTION INTRAMUSCULAR; INTRAVENOUS ONCE
Status: COMPLETED | OUTPATIENT
Start: 2024-09-28 | End: 2024-09-28

## 2024-09-28 RX ADMIN — KETOROLAC TROMETHAMINE 30 MG: 30 INJECTION, SOLUTION INTRAMUSCULAR at 23:17

## 2024-09-28 RX ADMIN — OXYCODONE HYDROCHLORIDE AND ACETAMINOPHEN 1 TABLET: 5; 325 TABLET ORAL at 22:58

## 2024-09-28 ASSESSMENT — PAIN SCALES - GENERAL
PAINLEVEL_OUTOF10: 6
PAINLEVEL_OUTOF10: 9

## 2024-09-28 ASSESSMENT — PAIN - FUNCTIONAL ASSESSMENT: PAIN_FUNCTIONAL_ASSESSMENT: 0-10

## 2024-09-29 NOTE — ED NOTES
Pt arrives via private auto, Pt C/O suprapubic pain that started several days ago. Pt reports his urine may be darker than normal, but denies any pain or issues with urination. Pt also reports several episodes of diarrhea in the last 3 days. Pt also C/O long term dental pain. Pt reports taking tylenol approx 4 hrs prior to arrival.

## 2024-09-29 NOTE — DISCHARGE INSTRUCTIONS
Abdominal Pain   The doctor who examined you has not found a serious cause of the abdominal pain at this time. It is important that you carefully watch for changes in the abdominal pain that might suggest a serious condition (such as appendicitis that is difficult to diagnose early). See your doctor or return to the emergency department immediately if your condition gets worse. You can also call us if you are not sure what to do.   See your doctor tomorrow or as soon as possible if you are not getting better.   These symptoms suggest serious causes of abdominal pain. Call your doctor or return to the emergency department if you are feeling worse or if:   1. You are unable to walk easily or are walking in a bent-over position.   2. Stepping or jumping results in severe pain.   3. You are experiencing pain in the right lower part of the abdomen.   4. The abdomen is hard and painful when you press on it.   5. There is severe abdominal pain when coughing.   6. You are vomiting or gagging.   7. Vomiting is bloody or green or looks like chocolate or coffee.   8. The belly looks very full or big.   9. You are experiencing severe pain every 3 to 20 minutes.   10. Stool (poop) is bloody or black.   11. You are drowsy, weak, fussy, or pale   Follow-up with your dentist for make more pain medication, continue taking Tylenol and oral gel, gargle with a good strong mouth rinse.  Soft diet for the next several days.  Follow-up with your urologist for further evaluation of your suprapubic pain your urine is negative here make sure you are drinking 10 to 12 glasses of water a day.  Return to an ER if worse.

## 2024-09-29 NOTE — ED PROVIDER NOTES
bilaterally no wheezes, rhonchi, rales, no respiratory distress  Gastrointestinal: Soft, nontender, nondistended, positive bowel sounds.  No rebound, rigidity, or guarding.  Musculoskeletal: No extremity pain or swelling  Neurologic: Moving all 4 extremities without difficulty there are no gross focal neurologic deficits  Skin: Warm and dry    DIFFERENTIAL DIAGNOSIS/ MDM:     Ongoing chronic odontalgia, patient can have 1 Percocet here and he can continue Tylenol and Orajel at home.  He needs to call his dentist to see if they will give him any more medication.  Or his PCP- we are getting a urine sample to see if there is a urinary tract infection.    DIAGNOSTIC RESULTS     EKG: All EKG's are interpreted by the Emergency Department Physician who either signs or Co-signs this chart in the absence of a cardiologist.        Not indicated unless otherwise documented above    LABS:  Results for orders placed or performed during the hospital encounter of 09/28/24   Urinalysis with Reflex to Culture    Specimen: Urine   Result Value Ref Range    Color, UA Yellow Yellow    Turbidity UA Clear Clear    Glucose, Ur NEGATIVE NEGATIVE mg/dL    Bilirubin, Urine NEGATIVE NEGATIVE    Ketones, Urine NEGATIVE NEGATIVE mg/dL    Specific Gravity, UA 1.020 1.005 - 1.030    Urine Hgb NEGATIVE NEGATIVE    pH, Urine 7.0 5.0 - 8.0    Protein, UA NEGATIVE NEGATIVE mg/dL    Urobilinogen, Urine Normal 0.0 - 1.0 EU/dL    Nitrite, Urine NEGATIVE NEGATIVE    Leukocyte Esterase, Urine NEGATIVE NEGATIVE    Comment       Microscopic exam not performed based on chemical results unless requested in original order.    Comment          Comment       Utilizing a urinalysis as the only screening method to exclude a potential uropathogen can be unreliable in many patient populations.  Rapid screening tests are less sensitive than culture and if UTI is a clinical possibility, culture should be considered despite a negative urinalysis.         Not indicated

## 2024-10-08 ENCOUNTER — HOSPITAL ENCOUNTER (EMERGENCY)
Facility: CLINIC | Age: 34
Discharge: HOME | End: 2024-10-08
Attending: STUDENT IN AN ORGANIZED HEALTH CARE EDUCATION/TRAINING PROGRAM
Payer: MEDICAID

## 2024-10-08 VITALS
BODY MASS INDEX: 38.36 KG/M2 | HEIGHT: 76 IN | OXYGEN SATURATION: 95 % | HEART RATE: 94 BPM | SYSTOLIC BLOOD PRESSURE: 148 MMHG | RESPIRATION RATE: 18 BRPM | TEMPERATURE: 98.5 F | WEIGHT: 315 LBS | DIASTOLIC BLOOD PRESSURE: 92 MMHG

## 2024-10-08 DIAGNOSIS — M54.50 ACUTE RIGHT-SIDED LOW BACK PAIN WITHOUT SCIATICA: Primary | ICD-10-CM

## 2024-10-08 PROCEDURE — 99284 EMERGENCY DEPT VISIT MOD MDM: CPT

## 2024-10-08 PROCEDURE — 6360000002 HC RX W HCPCS: Performed by: NURSE PRACTITIONER

## 2024-10-08 PROCEDURE — 6370000000 HC RX 637 (ALT 250 FOR IP): Performed by: NURSE PRACTITIONER

## 2024-10-08 PROCEDURE — 96372 THER/PROPH/DIAG INJ SC/IM: CPT

## 2024-10-08 RX ORDER — KETOROLAC TROMETHAMINE 30 MG/ML
30 INJECTION, SOLUTION INTRAMUSCULAR; INTRAVENOUS ONCE
Status: COMPLETED | OUTPATIENT
Start: 2024-10-08 | End: 2024-10-08

## 2024-10-08 RX ORDER — LIDOCAINE 4 G/G
1 PATCH TOPICAL DAILY
Status: DISCONTINUED | OUTPATIENT
Start: 2024-10-08 | End: 2024-10-09 | Stop reason: HOSPADM

## 2024-10-08 RX ADMIN — KETOROLAC TROMETHAMINE 30 MG: 30 INJECTION, SOLUTION INTRAMUSCULAR at 20:12

## 2024-10-08 ASSESSMENT — ENCOUNTER SYMPTOMS
EYE DISCHARGE: 0
BACK PAIN: 1
NAUSEA: 0
VOMITING: 0
DIARRHEA: 0
SHORTNESS OF BREATH: 0
CONSTIPATION: 0
COUGH: 0
ABDOMINAL PAIN: 0

## 2024-10-08 NOTE — DISCHARGE INSTRUCTIONS
Salonpas patches over-the-counter as directed to help with pain.    Continue on tizanidine as previously prescribed.    Tylenol over-the-counter as directed to help with pain.    Return to the ER: Fevers, continued worsening back pain, leg numbness or weakness, change in your bowel or bladder function, development of rashes, acute abdominal pain; or any other concerning symptoms.

## 2024-10-08 NOTE — ED PROVIDER NOTES
[facility]  EMERGENCY DEPARTMENT ENCOUNTER  INDEPENDENT ATTESTATION         1. Acute right-sided low back pain without sciatica          Pt Name: Maximilian Benton  MRN: [id number]  Birthdate 1990  Date of evaluation: 10/8/24    Maximilian Benton is a 34 y.o. male who presents with Back Pain  .    Based on the medical record, the care appears appropriate. I was personally available for consultation in the Emergency Department.      FINAL IMPRESSION      1. Acute right-sided low back pain without sciatica          DISPOSITION / PLAN     DISPOSITION Decision To Discharge 10/08/2024 07:57:38 PM  Condition at Disposition: Data Unavailable      PATIENT REFERRED TO:  Caryl Trinh, APRN - CNP  [address]  [facility] [address]  [phone]      Keep plan for MRI in October 20, discussed physical therapy.      DISCHARGE MEDICATIONS:  New Prescriptions    No medications on file       Dr. Sheyla Serrano DO   Attending Emergency Physician       Sheyla Serrano DO  10/08/24 9508    
Never   Vaping Use    Vaping status: Every Day    Substances: Nicotine   Substance and Sexual Activity    Alcohol use: Yes     Comment: occ    Drug use: No    Sexual activity: Yes     Partners: Male     Social Determinants of Health     Financial Resource Strain: Low Risk  (8/23/2024)    Received from [facility]    Overall Financial Resource Strain (CARDIA)     Difficulty of Paying Living Expenses: Not hard at all   Food Insecurity: No Food Insecurity (10/4/2024)    Received from [employer]    Hunger Screening     Within the past 12 months we worried whether our food would run out before we got money to buy more.: Never True     Within the past 12 months the food we bought just didn't last and we didn't have money to get more.: Never True   Transportation Needs: No Transportation Needs (8/23/2024)    Received from [facility]    Transportation     In the past 12 months, has lack of transportation kept you from medical appointments or from getting medications?: No   Physical Activity: Sufficiently Active (1/3/2022)    Received from [employer] [facility], [employer]    Exercise Vital Sign     Days of Exercise per Week: 5 days     Minutes of Exercise per Session: 30 min   Stress: Stress Concern Present (1/3/2022)    Received from [employer], [facility] [facility]    [employer] [place] [employer] - Occupational Stress Questionnaire     Feeling of Stress : Very much   Social Connections: Socially Isolated (1/3/2022)    [facility] [employer], [facility]    Social Connection and Isolation Panel [NHANES]     Frequency of Communication with Friends and Family: More than three times a week     Frequency of Social Gatherings with Friends and Family: Once a week     Attends [employer] Services: Never     Active Member of Clubs or Organizations: No     Attends Club or Organization Meetings: Never     Marital Status: Never

## 2024-10-20 ENCOUNTER — HOSPITAL ENCOUNTER (EMERGENCY)
Facility: CLINIC | Age: 34
Discharge: HOME | End: 2024-10-20
Attending: STUDENT IN AN ORGANIZED HEALTH CARE EDUCATION/TRAINING PROGRAM
Payer: MEDICAID

## 2024-10-20 VITALS
HEIGHT: 77 IN | DIASTOLIC BLOOD PRESSURE: 85 MMHG | TEMPERATURE: 97.5 F | OXYGEN SATURATION: 98 % | RESPIRATION RATE: 16 BRPM | SYSTOLIC BLOOD PRESSURE: 140 MMHG | BODY MASS INDEX: 37.19 KG/M2 | HEART RATE: 96 BPM | WEIGHT: 315 LBS

## 2024-10-20 DIAGNOSIS — K08.89 PAIN, DENTAL: Primary | ICD-10-CM

## 2024-10-20 PROCEDURE — 99283 EMERGENCY DEPT VISIT LOW MDM: CPT

## 2024-10-20 PROCEDURE — 6370000000 HC RX 637 (ALT 250 FOR IP): Performed by: STUDENT IN AN ORGANIZED HEALTH CARE EDUCATION/TRAINING PROGRAM

## 2024-10-20 RX ORDER — CLINDAMYCIN HYDROCHLORIDE 150 MG/1
450 CAPSULE ORAL 2 TIMES DAILY
Qty: 42 CAPSULE | Refills: 0 | Status: SHIPPED | OUTPATIENT
Start: 2024-10-20 | End: 2024-10-27

## 2024-10-20 RX ORDER — CLINDAMYCIN HYDROCHLORIDE 150 MG/1
450 CAPSULE ORAL ONCE
Status: COMPLETED | OUTPATIENT
Start: 2024-10-20 | End: 2024-10-20

## 2024-10-20 RX ADMIN — CLINDAMYCIN HYDROCHLORIDE 450 MG: 150 CAPSULE ORAL at 04:30

## 2024-10-20 ASSESSMENT — PAIN - FUNCTIONAL ASSESSMENT: PAIN_FUNCTIONAL_ASSESSMENT: 0-10

## 2024-10-20 ASSESSMENT — PAIN SCALES - GENERAL: PAINLEVEL_OUTOF10: 8

## 2024-10-20 NOTE — ED PROVIDER NOTES
[facility]  Emergency Department Encounter  [facility]       Pt Name:Maximilian Benton  MRN: [id number]  Birthdate 1990  Date of evaluation: 10/20/24  PCP:  Caryl Trinh APRN - CNP      CHIEF COMPLAINT       Chief Complaint   Patient presents with    Facial Swelling     R side       HISTORY OF PRESENT ILLNESS     Maximilian Benton is a 34 y.o. male who presents via personal vehicle with reports of dental discomfort.  Patient had tooth extraction on Wednesday.  He is planned for multiple tooth extraction, rest of his teeth.  He reports that he started having discomfort soon after.  He has been taking Tylenol with minimal relief.  He reports significant sensitivity.  Including liquids, solids, air.  He has been able to eat and drink.  He has no difficulty swallowing.  He is no fevers.  He will be able to follow-up with his oral surgeon on Monday.  No recent antibiotics.    PAST MEDICAL / SURGICAL / SOCIAL / FAMILY HISTORY      has a past medical history of Anxiety, Asthma, Bipolar disorder (HCC), Depression, GERD (gastroesophageal reflux disease), Headache, Hypertension, Hypothyroidism, Insomnia, and Unspecified sleep apnea.       has a past surgical history that includes [place] tooth extraction; Upper gastrointestinal endoscopy; Finger trigger release; Tonsillectomy; and Gastric bypass surgery.      Social History     Socioeconomic History    Marital status: Single     Spouse name: Not on file    Number of children: Not on file    Years of education: Not on file    Highest education level: Not on file   Occupational History    Not on file   Tobacco Use    Smoking status: Every Day     Current packs/day: 0.50     Types: Cigarettes    Smokeless tobacco: Never   Vaping Use    Vaping status: Every Day    Substances: Nicotine   Substance and Sexual Activity    Alcohol use: Yes     Comment: occ    Drug use: No    Sexual activity: Yes     Partners: Male   Other Topics Concern

## 2024-10-20 NOTE — ED NOTES
Pt arrives via private auto, C/O facial swelling to R side of face. Pt states he had tooth pulled on Wednesday. Pt reports taking tylenol approx 3 hrs ago. Pt speech clear. A/Ox4

## 2024-10-20 NOTE — DISCHARGE INSTRUCTIONS
SUMMARY OF YOUR VISIT    Today you were seen for pain your dental extraction site.  Estrogen antibiotic to prevent worsening infection.  We discussed that if this is an early dry socket unfortunately we do not have packing available here in the ER.  I do recommend you follow-up with your surgeon on Monday morning for reevaluation for dry socket, in the meantime we discussed I have started you on an antibiotic to prevent worsening infection especially in the setting of upcoming extractions planned.    As we discussed I do recommend over-the-counter options including dry socket paste, or dental wax which can be obtained at your local pharmacy or store once they open up today.  Until you are able to follow-up with your dental surgeon tomorrow.    If you have any new, changing or worsening of symptoms recommend return to the emerged part for reevaluation.    Please continue to take your home medication as previously prescribed, I have made no changes to your home medications.        You can return to our or another Emergency Department as needed or for worsening symptoms of chest pain, shortness of breath, high fevers not relieved by acetaminophen (Tylenol) and/or ibuprofen (Motrin / Advil), chills, feeling of your heart fluttering or racing, persistent nausea and/or vomiting, vomiting up blood, blood in your stool, loss of consciousness, numbness, weakness or tingling in the arms or legs or change in color of the extremities, changes in mental status, persistent headache, blurry vision, loss of bladder / bowel control, if you are unable to follow up with your physician, or other any other care or concern.    Thank You!    On behalf of the Emergency Department staff and team, I would like to thank you for allowing us the opportunity to participate in your health care and evaluation today.

## 2024-10-23 ENCOUNTER — HOSPITAL ENCOUNTER (EMERGENCY)
Facility: CLINIC | Age: 34
Discharge: HOME | End: 2024-10-23
Attending: EMERGENCY MEDICINE
Payer: COMMERCIAL

## 2024-10-23 VITALS
TEMPERATURE: 98.5 F | DIASTOLIC BLOOD PRESSURE: 101 MMHG | RESPIRATION RATE: 15 BRPM | HEIGHT: 76 IN | BODY MASS INDEX: 38.36 KG/M2 | HEART RATE: 93 BPM | WEIGHT: 315 LBS | OXYGEN SATURATION: 95 % | SYSTOLIC BLOOD PRESSURE: 131 MMHG
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
## 2024-10-23 DIAGNOSIS — S13.4XXA WHIPLASH INJURY TO NECK, INITIAL ENCOUNTER: Primary | ICD-10-CM

## 2024-10-23 PROCEDURE — 6360000002 HC RX W HCPCS: Performed by: EMERGENCY MEDICINE

## 2024-10-23 PROCEDURE — 99283 EMERGENCY DEPT VISIT LOW MDM: CPT

## 2024-10-23 RX ORDER — PREDNISONE 20 MG/1
40 TABLET ORAL DAILY
Qty: 8 TABLET | Refills: 0 | Status: SHIPPED | OUTPATIENT
Start: 2024-10-23 | End: 2024-10-23

## 2024-10-23 RX ORDER — PREDNISONE 20 MG/1
40 TABLET ORAL DAILY
Qty: 8 TABLET | Refills: 0 | Status: SHIPPED | OUTPATIENT
Start: 2024-10-23 | End: 2024-10-27

## 2024-10-23 RX ORDER — DEXAMETHASONE 4 MG/1
8 TABLET ORAL ONCE
Status: COMPLETED | OUTPATIENT
Start: 2024-10-23 | End: 2024-10-23

## 2024-10-23 RX ADMIN — DEXAMETHASONE 8 MG: 4 TABLET ORAL at 05:09

## 2024-10-23 ASSESSMENT — PAIN SCALES - GENERAL: PAINLEVEL_OUTOF10: 9

## 2024-10-23 ASSESSMENT — PAIN DESCRIPTION - PAIN TYPE: TYPE: ACUTE PAIN

## 2024-10-23 ASSESSMENT — PAIN - FUNCTIONAL ASSESSMENT: PAIN_FUNCTIONAL_ASSESSMENT: 0-10

## 2024-10-23 ASSESSMENT — PAIN DESCRIPTION - DESCRIPTORS: DESCRIPTORS: ACHING

## 2024-10-23 ASSESSMENT — PAIN DESCRIPTION - LOCATION: LOCATION: BACK;NECK

## 2024-10-23 ASSESSMENT — PAIN DESCRIPTION - FREQUENCY: FREQUENCY: CONTINUOUS
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
## 2024-12-11 ENCOUNTER — HOSPITAL ENCOUNTER (EMERGENCY)
Facility: CLINIC | Age: 34
Discharge: HOME | End: 2024-12-11
Attending: EMERGENCY MEDICINE
Payer: COMMERCIAL

## 2024-12-11 ENCOUNTER — APPOINTMENT (OUTPATIENT)
Dept: CT IMAGING | Facility: CLINIC | Age: 34
End: 2024-12-11
Payer: COMMERCIAL

## 2024-12-11 VITALS
SYSTOLIC BLOOD PRESSURE: 144 MMHG | BODY MASS INDEX: 38.36 KG/M2 | HEIGHT: 76 IN | DIASTOLIC BLOOD PRESSURE: 100 MMHG | TEMPERATURE: 97.7 F | HEART RATE: 70 BPM | OXYGEN SATURATION: 100 % | WEIGHT: 315 LBS | RESPIRATION RATE: 16 BRPM

## 2024-12-11 DIAGNOSIS — R10.11 RIGHT UPPER QUADRANT ABDOMINAL PAIN: Primary | ICD-10-CM

## 2024-12-11 LAB
ALBUMIN SERPL-MCNC: 4.2 G/DL (ref 3.5–5.2)
ALBUMIN/GLOB SERPL: 1.7 {RATIO} (ref 1–2.5)
ALP SERPL-CCNC: 104 U/L (ref 40–129)
ALT SERPL-CCNC: 21 U/L (ref 5–41)
ANION GAP SERPL CALCULATED.3IONS-SCNC: 10 MMOL/L (ref 9–17)
AST SERPL-CCNC: 21 U/L
BASOPHILS # BLD: 0.1 K/UL (ref 0–0.2)
BASOPHILS NFR BLD: 1 % (ref 0–2)
BILIRUB DIRECT SERPL-MCNC: 0.1 MG/DL
BILIRUB INDIRECT SERPL-MCNC: 0.2 MG/DL (ref 0–1)
BILIRUB SERPL-MCNC: 0.3 MG/DL (ref 0.3–1.2)
BILIRUB UR QL STRIP: NEGATIVE
BUN SERPL-MCNC: 7 MG/DL (ref 6–20)
CALCIUM SERPL-MCNC: 9 MG/DL (ref 8.6–10.4)
CHLORIDE SERPL-SCNC: 108 MMOL/L (ref 98–107)
CLARITY UR: CLEAR
CO2 SERPL-SCNC: 24 MMOL/L (ref 20–31)
COLOR UR: YELLOW
COMMENT: NORMAL
CREAT SERPL-MCNC: 0.9 MG/DL (ref 0.7–1.2)
EOSINOPHIL # BLD: 0.3 K/UL (ref 0–0.4)
EOSINOPHILS RELATIVE PERCENT: 4 % (ref 1–4)
ERYTHROCYTE [DISTWIDTH] IN BLOOD BY AUTOMATED COUNT: 13.9 % (ref 12.5–15.4)
GFR, ESTIMATED: >90 ML/MIN/1.73M2
GLUCOSE SERPL-MCNC: 128 MG/DL (ref 70–99)
GLUCOSE UR STRIP-MCNC: NEGATIVE MG/DL
HCT VFR BLD AUTO: 41.3 % (ref 41–53)
HGB BLD-MCNC: 14.1 G/DL (ref 13.5–17.5)
HGB UR QL STRIP.AUTO: NEGATIVE
KETONES UR STRIP-MCNC: NEGATIVE MG/DL
LACTATE BLDV-SCNC: 1.1 MMOL/L (ref 0.5–2.2)
LEUKOCYTE ESTERASE UR QL STRIP: NEGATIVE
LIPASE SERPL-CCNC: 54 U/L (ref 13–60)
LYMPHOCYTES NFR BLD: 3 K/UL (ref 1–4.8)
LYMPHOCYTES RELATIVE PERCENT: 38 % (ref 24–44)
MCH RBC QN AUTO: 29.3 PG (ref 26–34)
MCHC RBC AUTO-ENTMCNC: 34.2 G/DL (ref 31–37)
MCV RBC AUTO: 85.7 FL (ref 80–100)
MONOCYTES NFR BLD: 0.6 K/UL (ref 0.1–1.2)
MONOCYTES NFR BLD: 8 % (ref 2–11)
NEUTROPHILS NFR BLD: 49 % (ref 36–66)
NEUTS SEG NFR BLD: 3.9 K/UL (ref 1.8–7.7)
NITRITE UR QL STRIP: NEGATIVE
PH UR STRIP: 7 [PH] (ref 5–8)
PLATELET # BLD AUTO: 192 K/UL (ref 140–450)
PMV BLD AUTO: 8.5 FL (ref 6–12)
POTASSIUM SERPL-SCNC: 4 MMOL/L (ref 3.7–5.3)
PROT SERPL-MCNC: 6.7 G/DL (ref 6.4–8.3)
PROT UR STRIP-MCNC: NEGATIVE MG/DL
RBC # BLD AUTO: 4.82 M/UL (ref 4.5–5.9)
SODIUM SERPL-SCNC: 142 MMOL/L (ref 135–144)
SP GR UR STRIP: 1.02 (ref 1–1.03)
UROBILINOGEN UR STRIP-ACNC: NORMAL EU/DL (ref 0–1)
WBC OTHER # BLD: 7.9 K/UL (ref 3.5–11)

## 2024-12-11 PROCEDURE — 85025 COMPLETE CBC W/AUTO DIFF WBC: CPT

## 2024-12-11 PROCEDURE — 80048 BASIC METABOLIC PNL TOTAL CA: CPT

## 2024-12-11 PROCEDURE — 96374 THER/PROPH/DIAG INJ IV PUSH: CPT

## 2024-12-11 PROCEDURE — 80076 HEPATIC FUNCTION PANEL: CPT

## 2024-12-11 PROCEDURE — 6360000002 HC RX W HCPCS: Performed by: EMERGENCY MEDICINE

## 2024-12-11 PROCEDURE — 99284 EMERGENCY DEPT VISIT MOD MDM: CPT

## 2024-12-11 PROCEDURE — 2500000003 HC RX 250 WO HCPCS: Performed by: EMERGENCY MEDICINE

## 2024-12-11 PROCEDURE — 81003 URINALYSIS AUTO W/O SCOPE: CPT

## 2024-12-11 PROCEDURE — 74176 CT ABD & PELVIS W/O CONTRAST: CPT

## 2024-12-11 PROCEDURE — 96376 TX/PRO/DX INJ SAME DRUG ADON: CPT

## 2024-12-11 PROCEDURE — 83690 ASSAY OF LIPASE: CPT

## 2024-12-11 PROCEDURE — 2580000003 HC RX 258: Performed by: EMERGENCY MEDICINE

## 2024-12-11 PROCEDURE — 83605 ASSAY OF LACTIC ACID: CPT

## 2024-12-11 PROCEDURE — 96375 TX/PRO/DX INJ NEW DRUG ADDON: CPT

## 2024-12-11 RX ORDER — MORPHINE SULFATE 4 MG/ML
4 INJECTION, SOLUTION INTRAMUSCULAR; INTRAVENOUS ONCE
Status: COMPLETED | OUTPATIENT
Start: 2024-12-11 | End: 2024-12-11

## 2024-12-11 RX ORDER — ONDANSETRON 2 MG/ML
4 INJECTION INTRAMUSCULAR; INTRAVENOUS ONCE
Status: COMPLETED | OUTPATIENT
Start: 2024-12-11 | End: 2024-12-11

## 2024-12-11 RX ORDER — DICYCLOMINE HYDROCHLORIDE 10 MG/1
10 CAPSULE ORAL 3 TIMES DAILY PRN
Qty: 20 CAPSULE | Refills: 0 | Status: SHIPPED | OUTPATIENT
Start: 2024-12-11

## 2024-12-11 RX ORDER — ONDANSETRON 4 MG/1
4 TABLET, ORALLY DISINTEGRATING ORAL 3 TIMES DAILY PRN
Qty: 21 TABLET | Refills: 0 | Status: SHIPPED | OUTPATIENT
Start: 2024-12-11

## 2024-12-11 RX ADMIN — ONDANSETRON 4 MG: 2 INJECTION INTRAMUSCULAR; INTRAVENOUS at 04:31

## 2024-12-11 RX ADMIN — MORPHINE SULFATE 4 MG: 4 INJECTION, SOLUTION INTRAMUSCULAR; INTRAVENOUS at 04:31

## 2024-12-11 RX ADMIN — FAMOTIDINE 20 MG: 10 INJECTION, SOLUTION INTRAVENOUS at 04:30

## 2024-12-11 RX ADMIN — MORPHINE SULFATE 4 MG: 4 INJECTION, SOLUTION INTRAMUSCULAR; INTRAVENOUS at 05:39

## 2024-12-11 ASSESSMENT — PAIN SCALES - GENERAL
PAINLEVEL_OUTOF10: 10

## 2024-12-11 ASSESSMENT — PAIN DESCRIPTION - LOCATION
LOCATION: ABDOMEN

## 2024-12-11 ASSESSMENT — PAIN - FUNCTIONAL ASSESSMENT: PAIN_FUNCTIONAL_ASSESSMENT: 0-10

## 2024-12-11 NOTE — ED PROVIDER NOTES
injection    morphine sulfate (PF) injection 4 mg    dicyclomine (BENTYL) 10 MG capsule     Sig: Take 1 capsule by mouth 3 times daily as needed (cramping)     Dispense:  20 capsule     Refill:  0    ondansetron (ZOFRAN-ODT) 4 MG disintegrating tablet     Sig: Take 1 tablet by mouth 3 times daily as needed for Nausea or Vomiting     Dispense:  21 tablet     Refill:  0        Vitals:    Vitals:    12/11/24 0402 12/11/24 0555   BP: (!) 163/115 (!) 144/100   Pulse: 70    Resp: 16    Temp: 97.7 °F (36.5 °C)    TempSrc: Oral    SpO2: 100%    Weight: (!) 163.3 kg (360 lb)    Height: 1.93 m (6' 4\")      -------------------------  BP: (!) 144/100, Temp: 97.7 °F (36.5 °C), Pulse: 70, Respirations: 16    CONSULTS:  None    CRITICAL CARE:   None    PROCEDURES:  None    DIAGNOSIS/ MDM:   Maximilian Benton is a 34 y.o. male who presents with acute on chronic abdominal pain and nausea.  His pain improved with morphine and Zofran.  Vital signs are stable.  CBC, CMP, lipase, urinalysis, and CT abdomen pelvis are unremarkable.  I suspect his symptoms are secondary to his chronic pain.  He was instructed to follow-up for his EGD in 2 days as scheduled and I prescribed him Bentyl and Zofran to help manage his symptoms.  I have low suspicion for acute abdomen, perforation or obstruction.  He was instructed to return to the ER for worsening symptoms or any other concern. The patient understands that at this time there is no evidence for a more malignant underlying process, but also understands that early in the process of an illness or injury, an emergency department work-up can be falsely reassuring.  Routine discharge counseling was given, and the patient understands that worsening, changing or persistent symptoms should prompt a immediate call or follow-up with their primary care physician or return to the emergency department.  The importance of appropriate follow-up was also discussed.  I have reviewed the disposition diagnosis

## 2024-12-11 NOTE — ED NOTES
Pt discharged in stable condition with prescriptions and dc instructions. Pt requesting soda before dc. Pt ambulates to door with steady gait and without assistance.

## 2024-12-11 NOTE — DISCHARGE INSTRUCTIONS
If given narcotics (opiates) during this Emergency Department visit, please do not drink, drive or operate any machinery for at least 4 - 6 hours.    Avoid eating any spicy food, milk type products or drinks that have caffeine in it.  Take all medications as prescribed.  For pain use ibuprofen (Motrin) or acetaminophen (Tylenol), unless prescribed medications that have acetaminophen in it.  You can take over the counter acetaminophen tablets (1 - 2 tablets of the 500-mg strength every 6 hours) or ibuprofen tablets (2 tablets every 4 hours).    PLEASE RETURN TO THE EMERGENCY DEPARTMENT IMMEDIATELY for worsening symptoms, or if you develop any concerning symptoms such as: high fever not relieved by acetaminophen (Tylenol) and/or ibuprofen (Motrin), chills, shortness of breath, chest pain, persistent nausea and/or vomiting, numbness, weakness or tingling in the arms or legs or change in color of the extremities, changes in mental status, persistent headache, blurry vision.

## 2024-12-11 NOTE — ED NOTES
Pt walked back from CT without difficulty. Pt on phone while writer administer medications. Pt in no distress at this time.

## 2024-12-11 NOTE — ED NOTES
Mode of arrival (squad #, walk in, police, etc) : walk in         Chief complaint(s): abd pain        Arrival Note (brief scenario, treatment PTA, etc).: pt reports 10/10 abd pain that causes difficulty breathing. Pt states he has been having abd pain for months. He is scheduled with GI for EGD on 12/13. Pt reports his PCP prescribed tylenol. Pt took 1300mg tylenol 1hr PTA. Pt reports no relief. No distress noted. GCS 15        C= \"Have you ever felt that you should Cut down on your drinking?\"  No  A= \"Have people Annoyed you by criticizing your drinking?\"  No  G= \"Have you ever felt bad or Guilty about your drinking?\"  No  E= \"Have you ever had a drink as an Eye-opener first thing in the morning to steady your nerves or to help a hangover?\"  No      Deferred []      Reason for deferring: N/A    *If yes to two or more: probable alcohol abuse.*

## 2025-01-07 ENCOUNTER — HOSPITAL ENCOUNTER (EMERGENCY)
Age: 35
Discharge: LEFT BEFORE DISCHARGE | End: 2025-01-07

## 2025-03-15 ENCOUNTER — HOSPITAL ENCOUNTER (EMERGENCY)
Facility: CLINIC | Age: 35
Discharge: HOME | End: 2025-03-15
Attending: EMERGENCY MEDICINE
Payer: COMMERCIAL

## 2025-03-15 VITALS
BODY MASS INDEX: 38.36 KG/M2 | RESPIRATION RATE: 16 BRPM | HEIGHT: 76 IN | TEMPERATURE: 97.7 F | SYSTOLIC BLOOD PRESSURE: 157 MMHG | OXYGEN SATURATION: 97 % | HEART RATE: 98 BPM | WEIGHT: 315 LBS | DIASTOLIC BLOOD PRESSURE: 108 MMHG

## 2025-03-15 DIAGNOSIS — S39.012A STRAIN OF LUMBAR REGION, INITIAL ENCOUNTER: ICD-10-CM
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
## 2025-03-15 PROCEDURE — 99284 EMERGENCY DEPT VISIT MOD MDM: CPT

## 2025-03-15 PROCEDURE — 6370000000 HC RX 637 (ALT 250 FOR IP): Performed by: EMERGENCY MEDICINE

## 2025-03-15 PROCEDURE — 96372 THER/PROPH/DIAG INJ SC/IM: CPT

## 2025-03-15 PROCEDURE — 6360000002 HC RX W HCPCS: Performed by: EMERGENCY MEDICINE

## 2025-03-15 RX ORDER — ONDANSETRON 4 MG/1
4 TABLET, FILM COATED ORAL EVERY 8 HOURS PRN
Qty: 20 TABLET | Refills: 0 | Status: SHIPPED | OUTPATIENT
Start: 2025-03-15

## 2025-03-15 RX ORDER — ONDANSETRON 4 MG/1
4 TABLET, ORALLY DISINTEGRATING ORAL ONCE
Status: COMPLETED | OUTPATIENT
Start: 2025-03-15 | End: 2025-03-15

## 2025-03-15 RX ORDER — LIDOCAINE 50 MG/G
1 PATCH TOPICAL DAILY
Qty: 30 PATCH | Refills: 0 | Status: SHIPPED | OUTPATIENT
Start: 2025-03-15 | End: 2025-04-14

## 2025-03-15 RX ORDER — OXYCODONE AND ACETAMINOPHEN 5; 325 MG/1; MG/1
1 TABLET ORAL ONCE
Refills: 0 | Status: COMPLETED | OUTPATIENT
Start: 2025-03-15 | End: 2025-03-15

## 2025-03-15 RX ORDER — PREDNISONE 20 MG/1
20 TABLET ORAL DAILY
COMMUNITY

## 2025-03-15 RX ORDER — OXYCODONE AND ACETAMINOPHEN 5; 325 MG/1; MG/1
1-2 TABLET ORAL EVERY 6 HOURS PRN
Qty: 12 TABLET | Refills: 0 | Status: SHIPPED | OUTPATIENT
Start: 2025-03-15 | End: 2025-03-22

## 2025-03-15 RX ORDER — KETOROLAC TROMETHAMINE 30 MG/ML
30 INJECTION, SOLUTION INTRAMUSCULAR; INTRAVENOUS ONCE
Status: COMPLETED | OUTPATIENT
Start: 2025-03-15 | End: 2025-03-15

## 2025-03-15 RX ADMIN — OXYCODONE AND ACETAMINOPHEN 1 TABLET: 5; 325 TABLET ORAL at 13:24

## 2025-03-15 RX ADMIN — ONDANSETRON 4 MG: 4 TABLET, ORALLY DISINTEGRATING ORAL at 13:32

## 2025-03-15 RX ADMIN — KETOROLAC TROMETHAMINE 30 MG: 30 INJECTION, SOLUTION INTRAMUSCULAR at 13:24

## 2025-03-15 ASSESSMENT — PAIN DESCRIPTION - DESCRIPTORS: DESCRIPTORS: BURNING

## 2025-03-15 ASSESSMENT — PAIN DESCRIPTION - PAIN TYPE: TYPE: ACUTE PAIN

## 2025-03-15 ASSESSMENT — PAIN DESCRIPTION - LOCATION: LOCATION: BACK

## 2025-03-15 ASSESSMENT — PAIN SCALES - GENERAL
PAINLEVEL_OUTOF10: 8
PAINLEVEL_OUTOF10: 8

## 2025-03-15 ASSESSMENT — PAIN - FUNCTIONAL ASSESSMENT: PAIN_FUNCTIONAL_ASSESSMENT: 0-10

## 2025-03-15 ASSESSMENT — PAIN DESCRIPTION - FREQUENCY: FREQUENCY: CONTINUOUS

## 2025-03-15 ASSESSMENT — PAIN DESCRIPTION - ORIENTATION: ORIENTATION: RIGHT;LEFT;LOWER
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
## 2025-03-15 NOTE — DISCHARGE INSTRUCTIONS
Take medications as prescribed  No driving if taking muscle relaxer or Percocet  Heat or ice as needed  For up with your family physician as scheduled  Return immediately if any worsening symptoms or any other concerns    Please understand that early in the process of an illness or injury, an emergency department workup can be falsely reassuring.      Tell us how we did visit: [email or web address]   and let us know about your experience
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
## 2025-03-23 ENCOUNTER — HOSPITAL ENCOUNTER (EMERGENCY)
Facility: CLINIC | Age: 35
Discharge: HOME | End: 2025-03-23
Attending: EMERGENCY MEDICINE
Payer: MEDICAID

## 2025-03-23 VITALS
WEIGHT: 315 LBS | RESPIRATION RATE: 18 BRPM | SYSTOLIC BLOOD PRESSURE: 162 MMHG | HEART RATE: 80 BPM | DIASTOLIC BLOOD PRESSURE: 102 MMHG | BODY MASS INDEX: 38.36 KG/M2 | OXYGEN SATURATION: 98 % | TEMPERATURE: 98 F | HEIGHT: 76 IN

## 2025-03-23 DIAGNOSIS — M54.50 ACUTE LEFT-SIDED LOW BACK PAIN, UNSPECIFIED WHETHER SCIATICA PRESENT: Primary | ICD-10-CM

## 2025-03-23 LAB
BASOPHILS # BLD: 0 K/UL (ref 0–0.2)
BASOPHILS NFR BLD: 0 % (ref 0–2)
EOSINOPHIL # BLD: 0.1 K/UL (ref 0–0.4)
EOSINOPHILS RELATIVE PERCENT: 1 % (ref 1–4)
ERYTHROCYTE [DISTWIDTH] IN BLOOD BY AUTOMATED COUNT: 13.7 % (ref 12.5–15.4)
HCT VFR BLD AUTO: 46.8 % (ref 41–53)
HGB BLD-MCNC: 15.7 G/DL (ref 13.5–17.5)
LYMPHOCYTES NFR BLD: 2.3 K/UL (ref 1–4.8)
LYMPHOCYTES RELATIVE PERCENT: 20 % (ref 24–44)
MCH RBC QN AUTO: 29.1 PG (ref 26–34)
MCHC RBC AUTO-ENTMCNC: 33.5 G/DL (ref 31–37)
MCV RBC AUTO: 86.7 FL (ref 80–100)
MONOCYTES NFR BLD: 1 K/UL (ref 0.1–1.2)
MONOCYTES NFR BLD: 9 % (ref 2–11)
NEUTROPHILS NFR BLD: 70 % (ref 36–66)
NEUTS SEG NFR BLD: 8.2 K/UL (ref 1.8–7.7)
PLATELET # BLD AUTO: 218 K/UL (ref 140–450)
PMV BLD AUTO: 8.6 FL (ref 6–12)
RBC # BLD AUTO: 5.4 M/UL (ref 4.5–5.9)
WBC OTHER # BLD: 11.6 K/UL (ref 3.5–11)

## 2025-03-23 PROCEDURE — 85025 COMPLETE CBC W/AUTO DIFF WBC: CPT

## 2025-03-23 PROCEDURE — 36415 COLL VENOUS BLD VENIPUNCTURE: CPT

## 2025-03-23 PROCEDURE — 99284 EMERGENCY DEPT VISIT MOD MDM: CPT

## 2025-03-23 PROCEDURE — 96372 THER/PROPH/DIAG INJ SC/IM: CPT

## 2025-03-23 PROCEDURE — 6360000002 HC RX W HCPCS: Performed by: EMERGENCY MEDICINE

## 2025-03-23 RX ORDER — KETOROLAC TROMETHAMINE 30 MG/ML
30 INJECTION, SOLUTION INTRAMUSCULAR; INTRAVENOUS ONCE
Status: COMPLETED | OUTPATIENT
Start: 2025-03-23 | End: 2025-03-23

## 2025-03-23 RX ADMIN — KETOROLAC TROMETHAMINE 30 MG: 30 INJECTION, SOLUTION INTRAMUSCULAR at 22:07

## 2025-03-23 ASSESSMENT — PAIN - FUNCTIONAL ASSESSMENT: PAIN_FUNCTIONAL_ASSESSMENT: 0-10

## 2025-03-23 ASSESSMENT — PAIN DESCRIPTION - LOCATION: LOCATION: BACK

## 2025-03-23 ASSESSMENT — LIFESTYLE VARIABLES
HOW MANY STANDARD DRINKS CONTAINING ALCOHOL DO YOU HAVE ON A TYPICAL DAY: 1 OR 2
HOW OFTEN DO YOU HAVE A DRINK CONTAINING ALCOHOL: MONTHLY OR LESS

## 2025-03-23 ASSESSMENT — PAIN SCALES - GENERAL: PAINLEVEL_OUTOF10: 8

## 2025-03-24 NOTE — ED PROVIDER NOTES
eMERGENCY dEPARTMENT eNCOUnter      Pt Name: Maximilian Benton  MRN: [id number]  Birthdate 1990  Date of evaluation: 3/23/2025      CHIEF COMPLAINT       Chief Complaint   Patient presents with    Back Pain     Received epidural on 3/18    Leg Pain    Diarrhea     Hx of gastric bypass          HISTORY OF PRESENT ILLNESS    Maximilian Benton is a 35 y.o. male who presents to the emergency department complaining of continuing low back pain status post having gotten an epidural injection 5 days ago.  This injection was done by pain control and doctor and anesthesiologist to give the patient some relief from radiculopathy that he has.  Patient is afebrile nontoxic looking has no sign of any kind of infection or problem with the injection site.    REVIEW OF SYSTEMS     Constitutional: No fevers or chills  HEENT: No sore throat, rhinorrhea, or earache  Eyes: No blurry vision or double vision no drainage  Cardiovascular: No chest pain or tachycardia  Respiratory: No wheezing or shortness of breath no cough  Gastrointestinal: No nausea, vomiting, diarrhea, constipation, or abdominal pain   : No hematuria or dysuria  Musculoskeletal: No swelling or pain, low back pain  Skin: No rash   Neurological: No focal neurologic complaints, paresthesias, weakness, or headache    PAST MEDICAL HISTORY    has a past medical history of Anxiety, Asthma, Bipolar disorder (HCC), Depression, GERD (gastroesophageal reflux disease), Headache, Hypertension, Hypothyroidism, Insomnia, and Unspecified sleep apnea.    SURGICAL HISTORY      has a past surgical history that includes [place] tooth extraction; Upper gastrointestinal endoscopy; Finger trigger release; Tonsillectomy; and Gastric bypass surgery.    CURRENT MEDICATIONS       Previous Medications    ACETAMINOPHEN (TYLENOL) 500 MG TABLET    Take 2 tablets by mouth every 6 hours as needed for Pain    ACETAMINOPHEN (TYLENOL) 500 MG TABLET    Take 2 tablets by mouth 3 times daily as needed

## 2025-03-24 NOTE — DISCHARGE INSTRUCTIONS
There is no sign of any kind of infection or any kind of problem near your epidural site.  We think that this is just a continuation of the pain that you have always been having so you should continue the medications that your doctors have prescribed for you but you should call them tomorrow to follow-up with them in the office within a couple of days.  Return back to an ER if you are worse.  Especially if you have high fevers.

## 2025-04-10 ENCOUNTER — HOSPITAL ENCOUNTER (EMERGENCY)
Facility: CLINIC | Age: 35
Discharge: HOME | End: 2025-04-11
Attending: SPECIALIST
Payer: MEDICAID

## 2025-04-10 ENCOUNTER — APPOINTMENT (OUTPATIENT)
Dept: GENERAL RADIOLOGY | Facility: CLINIC | Age: 35
End: 2025-04-10
Attending: SPECIALIST
Payer: MEDICAID

## 2025-04-10 DIAGNOSIS — S39.012A STRAIN OF LUMBAR REGION, INITIAL ENCOUNTER: Primary | ICD-10-CM

## 2025-04-10 PROCEDURE — 96372 THER/PROPH/DIAG INJ SC/IM: CPT

## 2025-04-10 PROCEDURE — 99284 EMERGENCY DEPT VISIT MOD MDM: CPT

## 2025-04-10 PROCEDURE — 72100 X-RAY EXAM L-S SPINE 2/3 VWS: CPT

## 2025-04-10 PROCEDURE — 6360000002 HC RX W HCPCS: Performed by: SPECIALIST

## 2025-04-10 RX ORDER — MORPHINE SULFATE 4 MG/ML
4 INJECTION, SOLUTION INTRAMUSCULAR; INTRAVENOUS ONCE
Status: COMPLETED | OUTPATIENT
Start: 2025-04-10 | End: 2025-04-10

## 2025-04-10 RX ADMIN — MORPHINE SULFATE 4 MG: 4 INJECTION, SOLUTION INTRAMUSCULAR; INTRAVENOUS at 23:15

## 2025-04-11 VITALS
SYSTOLIC BLOOD PRESSURE: 149 MMHG | OXYGEN SATURATION: 99 % | WEIGHT: 315 LBS | HEIGHT: 76 IN | RESPIRATION RATE: 20 BRPM | BODY MASS INDEX: 38.36 KG/M2 | TEMPERATURE: 97.6 F | HEART RATE: 114 BPM | DIASTOLIC BLOOD PRESSURE: 99 MMHG

## 2025-04-11 PROCEDURE — 6370000000 HC RX 637 (ALT 250 FOR IP): Performed by: SPECIALIST

## 2025-04-11 RX ORDER — PREDNISONE 20 MG/1
20 TABLET ORAL 2 TIMES DAILY
Qty: 10 TABLET | Refills: 0 | Status: SHIPPED | OUTPATIENT
Start: 2025-04-11 | End: 2025-04-16

## 2025-04-11 RX ORDER — PREDNISONE 20 MG/1
20 TABLET ORAL ONCE
Status: COMPLETED | OUTPATIENT
Start: 2025-04-11 | End: 2025-04-11

## 2025-04-11 RX ORDER — TRAMADOL HYDROCHLORIDE 50 MG/1
50 TABLET ORAL EVERY 6 HOURS PRN
Qty: 10 TABLET | Refills: 0 | Status: SHIPPED | OUTPATIENT
Start: 2025-04-11 | End: 2025-04-14

## 2025-04-11 RX ORDER — METAXALONE 800 MG/1
800 TABLET ORAL 3 TIMES DAILY PRN
Qty: 15 TABLET | Refills: 0 | Status: SHIPPED | OUTPATIENT
Start: 2025-04-11 | End: 2025-04-21

## 2025-04-11 RX ADMIN — PREDNISONE 20 MG: 20 TABLET ORAL at 00:46

## 2025-04-11 ASSESSMENT — ENCOUNTER SYMPTOMS: BACK PAIN: 1

## 2025-04-11 NOTE — DISCHARGE INSTRUCTIONS
Please understand that at this time there is no evidence for a more serious underlying process, but that early in the process of an illness or injury, an emergency department workup can be falsely reassuring.  You should contact your family doctor within the next 48 hours for a follow up appointment    THANK YOU!!!    From [facility] and [facility]    On behalf of the Emergency Department staff at [facility], I would like to thank you for giving us the opportunity to address your health care needs and concerns.    We hope that during your visit, our service was delivered in a professional and caring manner. Please keep [facility] in mind as we walk with you down the path to your own personal wellness.     Please expect an automated text message or email from us so we can ask a few questions about your health and progress. Based on your answers, a clinician may call you back to offer help and instructions.    Please understand that early in the process of an illness or injury, an emergency department workup can be falsely reassuring.  If you notice any worsening, changing or persistent symptoms please call your family doctor or return to the ER immediately.     Tell us how we did during your visit at [email or web address]   and let us know about your experience

## 2025-04-11 NOTE — ED PROVIDER NOTES
[facility] Department  [address] [facility] [address]  Phone: [phone]      Pt Name: Maximilian Benton  MRN: [id number]  Birthdate 1990  Date of evaluation: 4/10/2025      CHIEF COMPLAINT       Chief Complaint   Patient presents with    Back Pain         HISTORY OF PRESENT ILLNESS    Maximilian Benton is a 35 y.o. male who presents   Chief Complaint   Patient presents with    Back Pain   .    35-year-old male patient presents to the emergency department complaining of low back pain status post fall 8 days ago.  Patient states he was walking down the steps when he fell last 4 steps outside when it was raining.  He has history of back pain and attributes to 3 bulging disks.  The pain radiates to the left posterior thigh up to the knee but he denies any tingling, numbness or weakness in the lower extremities.  He denies any bladder or bowel incontinence and denies any fever or chills.  He has been taking Flexeril, Tylenol and Lyrica without much relief.  He has followed up with pain clinic as well as chiropractor in the past that currently follows up with primary care physician and neurologist.  He has appointment with his PCP on April 21, 2025.  Pain is worse with the movements and better with rest.    REVIEW OF SYSTEMS     Review of Systems   Constitutional:  Negative for chills and fever.   Eyes:  Negative for visual disturbance.   Musculoskeletal:  Positive for back pain and myalgias. Negative for neck pain.   Neurological:  Positive for headaches. Negative for dizziness and light-headedness.   All other systems reviewed and are negative.      PAST MEDICAL HISTORY    has a past medical history of Anxiety, Asthma, Bipolar disorder, Depression, GERD (gastroesophageal reflux disease), Headache, Hypertension, Hypothyroidism, Insomnia, and Unspecified sleep apnea.    SURGICAL HISTORY      has a past surgical history that includes [place] tooth extraction; Upper gastrointestinal endoscopy;  and or their family/guardian. I have answered their questions and given discharge instructions. They voiced understanding of these instructions and did not have any further questions or complaints.    Re-evaluation Notes    Patient is resting comfortably and does not appear to be in any severe pain or any distress prior to discharge.      PROCEDURES:  None    FINAL IMPRESSION      1. Strain of lumbar region, initial encounter          DISPOSITION/PLAN   DISPOSITION Decision To Discharge 04/11/2025 12:30:38 AM   DISPOSITION CONDITION Stable           Condition on Disposition    Stable    PATIENT REFERRED TO:  Yvrose Vázquez, APRN - CNP  [address]  [facility] [address] [phone]    Call in 2 days  For reevaluation of current symptoms    [facility] Emergency Department  [address] [address]  [phone]    If symptoms worsen      DISCHARGE MEDICATIONS:  Discharge Medication List as of 4/11/2025 12:36 AM        START taking these medications    Details   !! traMADol (ULTRAM) 50 MG tablet Take 1 tablet by mouth every 6 hours as needed for Pain for up to 3 days. Intended supply: 3 days. Take lowest dose possible to manage pain Max Daily Amount: 200 mg, Disp-10 tablet, R-0Normal      !! predniSONE (DELTASONE) 20 MG tablet Take 1 tablet by mouth 2 times daily for 5 days, Disp-10 tablet, R-0Normal      metaxalone (SKELAXIN) 800 MG tablet Take 1 tablet by mouth 3 times daily as needed for Pain, Disp-15 tablet, R-0Normal       !! - Potential duplicate medications found. Please discuss with provider.          (Please note that portions of this note were completed with a voice recognition program.  Efforts were made to edit the dictations but occasionally words are mis-transcribed.)    Sachin Kennedy MD,, MD, F.A.C.E.P.  Attending Emergency Physician       Sachin Kennedy MD  04/11/25 0141

## 2025-04-21 ENCOUNTER — HOSPITAL ENCOUNTER (EMERGENCY)
Facility: CLINIC | Age: 35
Discharge: HOME | End: 2025-04-21
Attending: EMERGENCY MEDICINE
Payer: MEDICAID

## 2025-04-21 VITALS
WEIGHT: 315 LBS | TEMPERATURE: 98.1 F | BODY MASS INDEX: 38.36 KG/M2 | SYSTOLIC BLOOD PRESSURE: 140 MMHG | HEIGHT: 76 IN | DIASTOLIC BLOOD PRESSURE: 84 MMHG | OXYGEN SATURATION: 98 % | HEART RATE: 98 BPM | RESPIRATION RATE: 18 BRPM

## 2025-04-21 DIAGNOSIS — M54.32 SCIATICA OF LEFT SIDE: Primary | ICD-10-CM

## 2025-04-21 PROCEDURE — 6360000002 HC RX W HCPCS

## 2025-04-21 PROCEDURE — 99284 EMERGENCY DEPT VISIT MOD MDM: CPT

## 2025-04-21 PROCEDURE — 96372 THER/PROPH/DIAG INJ SC/IM: CPT

## 2025-04-21 RX ORDER — KETOROLAC TROMETHAMINE 15 MG/ML
15 INJECTION, SOLUTION INTRAMUSCULAR; INTRAVENOUS ONCE
Status: COMPLETED | OUTPATIENT
Start: 2025-04-21 | End: 2025-04-21

## 2025-04-21 RX ADMIN — KETOROLAC TROMETHAMINE 15 MG: 15 INJECTION, SOLUTION INTRAMUSCULAR; INTRAVENOUS at 21:30

## 2025-04-21 ASSESSMENT — PAIN - FUNCTIONAL ASSESSMENT: PAIN_FUNCTIONAL_ASSESSMENT: 0-10

## 2025-04-21 ASSESSMENT — PAIN DESCRIPTION - DESCRIPTORS: DESCRIPTORS: SHOOTING;SHARP

## 2025-04-21 ASSESSMENT — PAIN DESCRIPTION - ORIENTATION: ORIENTATION: LEFT

## 2025-04-21 ASSESSMENT — PAIN SCALES - GENERAL: PAINLEVEL_OUTOF10: 10

## 2025-04-21 ASSESSMENT — PAIN DESCRIPTION - LOCATION: LOCATION: LEG

## 2025-04-22 NOTE — DISCHARGE INSTRUCTIONS
Use ice to the lower back 20 minutes at a time several times a day over the next couple days.  Take medications as prescribed over the next 2 to 3 days.  Do not drive or drink alcohol with these medications.  Do gentle stretching particularly lying on your stomach and pressing upward will help to decompress the lower back.  Stretching the back of your legs will also be helpful.  Follow-up with your primary care if you are not having improvement in 2 to 3 days as you may need physical therapy    Keep your upcoming appointment with neuro orthopedic spine on May 8, contact your gastro sleeve doctor to discuss risk and benefit of any other type of pain medication that you can take    Please understand that at this time there is no evidence for a more serious underlying process, but that early in the process of an illness or injury, an emergency department workup can be falsely reassuring.  You should contact your family doctor within the next 48 hours for a follow up appointment    THANK YOU!!!    From [facility] and [facility]    On behalf of the Emergency Department staff at [facility], I would like to thank you for giving us the opportunity to address your health care needs and concerns.    We hope that during your visit, our service was delivered in a professional and caring manner. Please keep [facility] in mind as we walk with you down the path to your own personal wellness.     Please expect an automated text message or email from us so we can ask a few questions about your health and progress. Based on your answers, a clinician may call you back to offer help and instructions.    Please understand that early in the process of an illness or injury, an emergency department workup can be falsely reassuring.  If you notice any worsening, changing or persistent symptoms please call your family doctor or return to the ER immediately.     Tell us how we did during your visit at

## 2025-04-22 NOTE — ED PROVIDER NOTES
[facility]  [address] [facility] [address]  Phone: [phone]      Attending Physician Attestation      I performed a history and physical examination of the patient and discussed management with all APPs. I reviewed the ANSELMO’s note and agree with the documented findings and plan of care. Any areas of disagreement are noted on the chart. I was personally present for the key portions of any procedures. I have documented in the chart those procedures where I was not present during the key portions. I have reviewed the emergency nurses triage note. I agree with the chief complaint, past medical history, past surgical history, allergies, medications, social and family history as documented unless otherwise noted below.  For Physician Assistant/ Nurse Practitioner cases/documentation I have personally evaluated this patient and have completed at least one if not all key elements of the E/M (history, physical exam, and MDM). Additional findings are as noted.    I personally made/approves the management plan for this patient's and take responsibility for the patient management.    The patient is a 35-year-old male who presents for evaluation of acute on chronic low back pain with sciatica down the left leg.  He had a MRI in February 2025 that shows degenerative disc disease.  He has been to pain management for injections and has been going to PT.  He has been taking Tylenol and Robaxin for symptoms without much improvement.  The patient is unable to take NSAIDs secondary to gastric bypass.  His pain is worse with certain movements.  He is scheduled to see neurosurgery on May 8.  He does not list any other provoking or palliating factors.  The patient denies fever, chills, headache, vision changes, neck pain, chest pain, shortness of breath, abdominal pain, urinary/bowel symptoms, incontinence, retention, saddle anesthesia, IV drug use, focal weakness, numbness, tingling, recent injury or 
neurologically intact and appears stable for discharge. No skin lesions were seen. The pulses are 2/4 bilaterally. The motor is 5/5 bilaterally. The sensation is intact.  Given the extremely low risk of these diagnoses further testing and evaluation for these possibilities does not appear to be indicated at this time. The patient was advised to return to the Emergency Department for worsening pain, numbness, weakness, difficulty with urination or defecation, difficulty with ambulation, fever, abdominal pain, coolness or color change to the extremity.      The patient understands that at this time there is no evidence for a more malignant underlying process, but the patient also understands that early in the process of an illness or injury, an emergency department workup can be falsely reassuring.  Routine discharge counseling was given, and the patient understands that worsening, changing or persistent symptoms should prompt an immediate call or follow up with their primary physician or return to the emergency department. The importance of appropriate follow up was also discussed.  I have reviewed the disposition diagnosis with the patient and or their family/guardian.  I have answered their questions and given discharge instructions.  They voiced understanding of these instructions and did not have any further questions or complaints.          DIFFERENTIAL DIAGNOSIS:   Sciatica, lumbar disc herniation, lumbar strain, cauda equina    PLAN:   No orders of the defined types were placed in this encounter.      MEDICATIONS ORDERED:     Orders Placed This Encounter   Medications    ketorolac (TORADOL) injection 15 mg       DIAGNOSTIC RESULTS:   [  EKG   ]    RADIOLOGY:     No orders to display     []    LABORATORY:   No results found for this visit on 04/21/25.    DEPARTMENT VITAL SIGNS:     Vitals:    04/21/25 2105   BP: (!) 140/84   Pulse: 98   Resp: 18   Temp: 98.1 °F (36.7 °C)   TempSrc: Oral   SpO2: 98%   Weight:

## 2025-04-22 NOTE — ED NOTES
Pt presents to ED ambulatory a&O x4.pt states he see neuro for back and leg issues. States he has a pinched nerve. Tried to call the office but they did not return the call. Pt has pinched nerve in lower back which is cause L leg pain. Pt reports that the leg pain is moving down to the lower leg and getting worse. Taking tylenol and muscle relaxer without relief.

## 2025-04-25 ENCOUNTER — APPOINTMENT (OUTPATIENT)
Dept: CT IMAGING | Facility: CLINIC | Age: 35
End: 2025-04-25
Attending: EMERGENCY MEDICINE
Payer: OTHER MISCELLANEOUS

## 2025-04-25 ENCOUNTER — HOSPITAL ENCOUNTER (EMERGENCY)
Facility: CLINIC | Age: 35
Discharge: HOME | End: 2025-04-25
Attending: EMERGENCY MEDICINE
Payer: OTHER MISCELLANEOUS

## 2025-04-25 VITALS
TEMPERATURE: 98.5 F | BODY MASS INDEX: 38.36 KG/M2 | RESPIRATION RATE: 16 BRPM | SYSTOLIC BLOOD PRESSURE: 139 MMHG | WEIGHT: 315 LBS | DIASTOLIC BLOOD PRESSURE: 89 MMHG | OXYGEN SATURATION: 97 % | HEIGHT: 76 IN | HEART RATE: 90 BPM

## 2025-04-25 DIAGNOSIS — M62.838 CERVICAL PARASPINAL MUSCLE SPASM: ICD-10-CM

## 2025-04-25 DIAGNOSIS — M62.830 LUMBAR PARASPINAL MUSCLE SPASM: ICD-10-CM

## 2025-04-25 DIAGNOSIS — M54.2 CHRONIC NECK PAIN: ICD-10-CM
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
## 2025-04-25 DIAGNOSIS — M54.42 CHRONIC BILATERAL LOW BACK PAIN WITH SCIATICA, SCIATICA LATERALITY UNSPECIFIED: ICD-10-CM

## 2025-04-25 DIAGNOSIS — G89.29 CHRONIC NECK PAIN: ICD-10-CM

## 2025-04-25 DIAGNOSIS — M54.41 CHRONIC BILATERAL LOW BACK PAIN WITH SCIATICA, SCIATICA LATERALITY UNSPECIFIED: ICD-10-CM

## 2025-04-25 DIAGNOSIS — G89.29 CHRONIC BILATERAL LOW BACK PAIN WITH SCIATICA, SCIATICA LATERALITY UNSPECIFIED: ICD-10-CM

## 2025-04-25 PROCEDURE — 72131 CT LUMBAR SPINE W/O DYE: CPT

## 2025-04-25 PROCEDURE — 6370000000 HC RX 637 (ALT 250 FOR IP): Performed by: EMERGENCY MEDICINE

## 2025-04-25 PROCEDURE — 72125 CT NECK SPINE W/O DYE: CPT

## 2025-04-25 PROCEDURE — 99284 EMERGENCY DEPT VISIT MOD MDM: CPT

## 2025-04-25 RX ORDER — ACETAMINOPHEN 500 MG
1000 TABLET ORAL ONCE
Status: COMPLETED | OUTPATIENT
Start: 2025-04-25 | End: 2025-04-25

## 2025-04-25 RX ADMIN — ACETAMINOPHEN 1000 MG: 500 TABLET ORAL at 18:38

## 2025-04-25 ASSESSMENT — ENCOUNTER SYMPTOMS
VOMITING: 0
NAUSEA: 0
SORE THROAT: 0
ABDOMINAL PAIN: 0
TROUBLE SWALLOWING: 0
CONSTIPATION: 0
DIARRHEA: 0
SHORTNESS OF BREATH: 0
BACK PAIN: 1
BLOOD IN STOOL: 0
WHEEZING: 0

## 2025-04-25 ASSESSMENT — PAIN DESCRIPTION - ONSET: ONSET: SUDDEN

## 2025-04-25 ASSESSMENT — PAIN DESCRIPTION - LOCATION
LOCATION: NECK
LOCATION: NECK

## 2025-04-25 ASSESSMENT — PAIN DESCRIPTION - DESCRIPTORS: DESCRIPTORS: SHARP;ACHING

## 2025-04-25 ASSESSMENT — PAIN DESCRIPTION - FREQUENCY: FREQUENCY: CONTINUOUS

## 2025-04-25 ASSESSMENT — PAIN DESCRIPTION - PAIN TYPE: TYPE: ACUTE PAIN

## 2025-04-25 ASSESSMENT — PAIN SCALES - GENERAL
PAINLEVEL_OUTOF10: 8
PAINLEVEL_OUTOF10: 8

## 2025-04-25 ASSESSMENT — PAIN DESCRIPTION - ORIENTATION: ORIENTATION: LEFT
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
## 2025-05-09 ENCOUNTER — APPOINTMENT (OUTPATIENT)
Dept: GENERAL RADIOLOGY | Facility: CLINIC | Age: 35
End: 2025-05-09
Payer: MEDICAID

## 2025-05-09 ENCOUNTER — HOSPITAL ENCOUNTER (EMERGENCY)
Facility: CLINIC | Age: 35
Discharge: HOME | End: 2025-05-09
Attending: EMERGENCY MEDICINE
Payer: MEDICAID

## 2025-05-09 VITALS
WEIGHT: 315 LBS | HEIGHT: 76 IN | TEMPERATURE: 98.2 F | RESPIRATION RATE: 18 BRPM | BODY MASS INDEX: 38.36 KG/M2 | OXYGEN SATURATION: 100 % | SYSTOLIC BLOOD PRESSURE: 138 MMHG | HEART RATE: 100 BPM | DIASTOLIC BLOOD PRESSURE: 105 MMHG

## 2025-05-09 DIAGNOSIS — M54.42 CHRONIC MIDLINE LOW BACK PAIN WITH LEFT-SIDED SCIATICA: ICD-10-CM

## 2025-05-09 DIAGNOSIS — G89.29 CHRONIC MIDLINE LOW BACK PAIN WITH LEFT-SIDED SCIATICA: ICD-10-CM

## 2025-05-09 DIAGNOSIS — J45.41 MODERATE PERSISTENT ASTHMA WITH EXACERBATION: Primary | ICD-10-CM

## 2025-05-09 LAB
BILIRUB UR QL STRIP: NEGATIVE
CLARITY UR: CLEAR
COLOR UR: YELLOW
COMMENT: NORMAL
GLUCOSE UR STRIP-MCNC: NEGATIVE MG/DL
HGB UR QL STRIP.AUTO: NEGATIVE
KETONES UR STRIP-MCNC: NEGATIVE MG/DL
LEUKOCYTE ESTERASE UR QL STRIP: NEGATIVE
NITRITE UR QL STRIP: NEGATIVE
PH UR STRIP: 7.5 [PH] (ref 5–8)
PROT UR STRIP-MCNC: NEGATIVE MG/DL
SP GR UR STRIP: 1.02 (ref 1–1.03)
UROBILINOGEN UR STRIP-ACNC: NORMAL EU/DL (ref 0–1)

## 2025-05-09 PROCEDURE — 6370000000 HC RX 637 (ALT 250 FOR IP)

## 2025-05-09 PROCEDURE — 81003 URINALYSIS AUTO W/O SCOPE: CPT

## 2025-05-09 PROCEDURE — 71045 X-RAY EXAM CHEST 1 VIEW: CPT

## 2025-05-09 PROCEDURE — 99284 EMERGENCY DEPT VISIT MOD MDM: CPT

## 2025-05-09 RX ORDER — PREDNISONE 20 MG/1
20 TABLET ORAL DAILY
Qty: 5 TABLET | Refills: 0 | Status: SHIPPED | OUTPATIENT
Start: 2025-05-09 | End: 2025-05-14

## 2025-05-09 RX ORDER — IPRATROPIUM BROMIDE AND ALBUTEROL SULFATE 2.5; .5 MG/3ML; MG/3ML
1 SOLUTION RESPIRATORY (INHALATION) ONCE
Status: COMPLETED | OUTPATIENT
Start: 2025-05-09 | End: 2025-05-09

## 2025-05-09 RX ORDER — AZITHROMYCIN 250 MG/1
TABLET, FILM COATED ORAL
Qty: 1 PACKET | Refills: 0 | Status: SHIPPED | OUTPATIENT
Start: 2025-05-09 | End: 2025-05-13

## 2025-05-09 RX ORDER — PREDNISONE 20 MG/1
40 TABLET ORAL ONCE
Status: COMPLETED | OUTPATIENT
Start: 2025-05-09 | End: 2025-05-09

## 2025-05-09 RX ORDER — IPRATROPIUM BROMIDE AND ALBUTEROL SULFATE 2.5; .5 MG/3ML; MG/3ML
1 SOLUTION RESPIRATORY (INHALATION) EVERY 6 HOURS PRN
Qty: 360 ML | Refills: 0 | Status: SHIPPED | OUTPATIENT
Start: 2025-05-09 | End: 2025-06-08

## 2025-05-09 RX ORDER — FLUTICASONE PROPIONATE 44 UG/1
1 AEROSOL, METERED RESPIRATORY (INHALATION) 2 TIMES DAILY
COMMUNITY

## 2025-05-09 RX ADMIN — IPRATROPIUM BROMIDE AND ALBUTEROL SULFATE 1 DOSE: .5; 2.5 SOLUTION RESPIRATORY (INHALATION) at 17:13

## 2025-05-09 RX ADMIN — PREDNISONE 40 MG: 20 TABLET ORAL at 16:43

## 2025-05-09 RX ADMIN — IPRATROPIUM BROMIDE AND ALBUTEROL SULFATE 1 DOSE: .5; 2.5 SOLUTION RESPIRATORY (INHALATION) at 16:44

## 2025-05-09 ASSESSMENT — PAIN - FUNCTIONAL ASSESSMENT: PAIN_FUNCTIONAL_ASSESSMENT: 0-10

## 2025-05-09 ASSESSMENT — PAIN DESCRIPTION - PAIN TYPE: TYPE: CHRONIC PAIN

## 2025-05-09 ASSESSMENT — PAIN DESCRIPTION - LOCATION: LOCATION: BACK

## 2025-05-09 ASSESSMENT — PAIN SCALES - GENERAL: PAINLEVEL_OUTOF10: 5

## 2025-05-09 ASSESSMENT — PAIN DESCRIPTION - FREQUENCY: FREQUENCY: CONTINUOUS

## 2025-05-09 ASSESSMENT — PAIN DESCRIPTION - ONSET: ONSET: ON-GOING

## 2025-05-09 NOTE — ED PROVIDER NOTES
[facility]  [address] [address]  Phone: [phone]      Patient Name:  Maximilian Benton  Medical Record Number:  [id number]  YOB: 1990  Date of Service:  5/9/2025  Primary Care Physician:  Greg Mercado MD      CHIEF COMPLAINT:       Chief Complaint   Patient presents with    Cough    Congestion     For over one week    Back Pain    Dysuria       HISTORY OF PRESENT ILLNESS:    Maximilian Benton is a 35 y.o. male who presents with the complaint of shortness of breath worse with activity, wheezing and cough.  Patient does endorse history of asthma and recent upper respiratory infection.  Most of his URI symptoms have improved except for the wheezing cough and shortness of breath.  He is also having burning with urination and rectal pain related to diarrhea.  He has chronic low back pain related to lumbar disc herniation he is seen neurosurgery and neurology for this no surgical plans at this time.  Denies loss of bowel or bladder control or retention, no constipation, no numbness or tingling or fevers.    CURRENT MEDICATIONS:      Previous Medications    ACETAMINOPHEN (TYLENOL) 500 MG TABLET    Take 2 tablets by mouth every 6 hours as needed for Pain    ACETAMINOPHEN (TYLENOL) 500 MG TABLET    Take 2 tablets by mouth 3 times daily as needed for Pain    ALPRAZOLAM (XANAX) 0.5 MG TABLET    Take 2 tablets by mouth nightly as needed for Sleep.    AMLODIPINE (NORVASC) 5 MG TABLET      Take 10 mg by mouth daily     CARIPRAZINE HCL (VRAYLAR) 4.5 MG CAPS CAPSULE    Take 1 capsule by mouth daily    CLONAZEPAM (KLONOPIN PO)    Take 0.75 mg by mouth    DICLOFENAC SODIUM (VOLTAREN) 1 % GEL    Apply 2 g topically 4 times daily    DICYCLOMINE (BENTYL) 10 MG CAPSULE    Take 1 capsule by mouth 3 times daily as needed (cramping)    EMTRICITABINE-TENOFOVIR ALAFENAMIDE (DESCOVY) 200-25 MG TABS TABLET    Take 1 tablet by mouth daily    FLUOXETINE (PROZAC) 40 MG CAPSULE    Total of 60

## 2025-05-09 NOTE — DISCHARGE INSTRUCTIONS
Take steroids and antibiotics as prescribed, use the nebulizer 3 times a day over the weekend after that as needed for wheezing and shortness of breath.  Follow-up with primary care early next week to have your asthma evaluated

## 2025-05-09 NOTE — ED NOTES
Pt. Ambulatory to room # 12 from waiting room with his brother, gait steady. Pt. C/o congestion, pain with breathing, wheezing, and slight cough. Pt. Also c/o pain with BM and urinating. Pt. Alert and oriented x4. RR equal and non labored. NAD noted. Call light within reach.

## 2025-05-09 NOTE — ED PROVIDER NOTES
[facility]  [address] [address]  Phone: [phone]        [facility] EMERGENCY DEPARTMENT  EMERGENCY DEPARTMENT ENCOUNTER      Pt Name: Maximilian Benton  MRN: [id number]  Birthdate 1990  Date of evaluation: 5/9/2025  Provider: Ruben Orozco DO    CHIEF COMPLAINT       Chief Complaint   Patient presents with    Cough    Congestion     For over one week    Back Pain    Dysuria         HISTORY OF PRESENT ILLNESS   (Location/Symptom, Timing/Onset,Context/Setting, Quality, Duration, Modifying Factors, Severity)  Note limiting factors.   Maximilian Benton is a 35 y.o. male who presents to the emergency department []     Nursing Notes were reviewed.    REVIEW OF SYSTEMS    (2-9systems for level 4, 10 or more for level 5)     Review of Systems    Except asnoted above the remainder of the review of systems was reviewed and negative.       PAST MEDICAL HISTORY     Past Medical History:   Diagnosis Date    Anxiety     Asthma     Bipolar disorder (HCC)     Depression     GERD (gastroesophageal reflux disease)     Headache     Hypertension     Hypothyroidism     Insomnia     Unspecified sleep apnea          SURGICAL HISTORY       Past Surgical History:   Procedure Laterality Date    FINGER TRIGGER RELEASE      GASTRIC BYPASS SURGERY      TONSILLECTOMY      UPPER GASTROINTESTINAL ENDOSCOPY      WISDOM TOOTH EXTRACTION           CURRENT MEDICATIONS     Previous Medications    ACETAMINOPHEN (TYLENOL) 500 MG TABLET    Take 2 tablets by mouth every 6 hours as needed for Pain    ACETAMINOPHEN (TYLENOL) 500 MG TABLET    Take 2 tablets by mouth 3 times daily as needed for Pain    ALPRAZOLAM (XANAX) 0.5 MG TABLET    Take 2 tablets by mouth nightly as needed for Sleep.    AMLODIPINE (NORVASC) 5 MG TABLET      Take 10 mg by mouth daily     CARIPRAZINE HCL (VRAYLAR) 4.5 MG CAPS CAPSULE    Take 1 capsule by mouth daily    CLONAZEPAM (KLONOPIN PO)    Take 0.75 mg by mouth    DICLOFENAC

## 2025-05-13 ENCOUNTER — APPOINTMENT (OUTPATIENT)
Dept: CT IMAGING | Facility: CLINIC | Age: 35
End: 2025-05-13
Payer: MEDICAID

## 2025-05-13 ENCOUNTER — HOSPITAL ENCOUNTER (EMERGENCY)
Facility: CLINIC | Age: 35
Discharge: HOME | End: 2025-05-13
Attending: EMERGENCY MEDICINE
Payer: MEDICAID

## 2025-05-13 VITALS
SYSTOLIC BLOOD PRESSURE: 169 MMHG | WEIGHT: 315 LBS | OXYGEN SATURATION: 99 % | TEMPERATURE: 97.9 F | HEART RATE: 96 BPM | HEIGHT: 76 IN | DIASTOLIC BLOOD PRESSURE: 94 MMHG | BODY MASS INDEX: 38.36 KG/M2 | RESPIRATION RATE: 18 BRPM

## 2025-05-13 DIAGNOSIS — J02.9 VIRAL PHARYNGITIS: Primary | ICD-10-CM

## 2025-05-13 DIAGNOSIS — J45.901 MODERATE ASTHMA WITH EXACERBATION, UNSPECIFIED WHETHER PERSISTENT: ICD-10-CM

## 2025-05-13 LAB
ALBUMIN SERPL-MCNC: 4.1 G/DL (ref 3.5–5.2)
ALBUMIN/GLOB SERPL: 1.6 {RATIO}
ALP SERPL-CCNC: 97 U/L (ref 40–129)
ALT SERPL-CCNC: 52 U/L (ref 10–50)
ANION GAP SERPL CALCULATED.3IONS-SCNC: 11 MMOL/L (ref 9–16)
AST SERPL-CCNC: 42 U/L (ref 10–50)
BASOPHILS # BLD: 0 K/UL (ref 0–0.2)
BASOPHILS NFR BLD: 1 % (ref 0–2)
BILIRUB DIRECT SERPL-MCNC: 0.2 MG/DL (ref 0–0.3)
BILIRUB INDIRECT SERPL-MCNC: 0.2 MG/DL (ref 0–1)
BILIRUB SERPL-MCNC: 0.4 MG/DL (ref 0–1.2)
BUN SERPL-MCNC: 9 MG/DL (ref 6–20)
CALCIUM SERPL-MCNC: 9 MG/DL (ref 8.6–10.4)
CHLORIDE SERPL-SCNC: 106 MMOL/L (ref 98–107)
CO2 SERPL-SCNC: 23 MMOL/L (ref 20–31)
CREAT SERPL-MCNC: 1 MG/DL (ref 0.7–1.2)
D DIMER PPP FEU-MCNC: 0.5 UG/ML FEU
EOSINOPHIL # BLD: 0.2 K/UL (ref 0–0.4)
EOSINOPHILS RELATIVE PERCENT: 3 % (ref 1–4)
ERYTHROCYTE [DISTWIDTH] IN BLOOD BY AUTOMATED COUNT: 14.3 % (ref 12.5–15.4)
FLUAV AG SPEC QL: NEGATIVE
FLUBV AG SPEC QL: NEGATIVE
GFR, ESTIMATED: >90 ML/MIN/1.73M2
GLUCOSE SERPL-MCNC: 108 MG/DL (ref 74–99)
HCT VFR BLD AUTO: 36 % (ref 41–53)
HGB BLD-MCNC: 12.6 G/DL (ref 13.5–17.5)
LYMPHOCYTES NFR BLD: 2.3 K/UL (ref 1–4.8)
LYMPHOCYTES RELATIVE PERCENT: 33 % (ref 24–44)
MAGNESIUM SERPL-MCNC: 2 MG/DL (ref 1.6–2.6)
MCH RBC QN AUTO: 29.8 PG (ref 26–34)
MCHC RBC AUTO-ENTMCNC: 35.1 G/DL (ref 31–37)
MCV RBC AUTO: 85 FL (ref 80–100)
MONOCYTES NFR BLD: 0.6 K/UL (ref 0.1–1.2)
MONOCYTES NFR BLD: 9 % (ref 2–11)
NEUTROPHILS NFR BLD: 54 % (ref 36–66)
NEUTS SEG NFR BLD: 3.8 K/UL (ref 1.8–7.7)
PLATELET # BLD AUTO: 200 K/UL (ref 140–450)
PMV BLD AUTO: 8.4 FL (ref 6–12)
POTASSIUM SERPL-SCNC: 3.1 MMOL/L (ref 3.7–5.3)
PROT SERPL-MCNC: 6.6 G/DL (ref 6.6–8.7)
RBC # BLD AUTO: 4.23 M/UL (ref 4.5–5.9)
SODIUM SERPL-SCNC: 140 MMOL/L (ref 136–145)
TROPONIN I SERPL HS-MCNC: 6 NG/L (ref 0–22)
WBC OTHER # BLD: 6.9 K/UL (ref 3.5–11)

## 2025-05-13 PROCEDURE — 84484 ASSAY OF TROPONIN QUANT: CPT

## 2025-05-13 PROCEDURE — 96375 TX/PRO/DX INJ NEW DRUG ADDON: CPT

## 2025-05-13 PROCEDURE — 83735 ASSAY OF MAGNESIUM: CPT

## 2025-05-13 PROCEDURE — 82248 BILIRUBIN DIRECT: CPT

## 2025-05-13 PROCEDURE — 70491 CT SOFT TISSUE NECK W/DYE: CPT

## 2025-05-13 PROCEDURE — 87804 INFLUENZA ASSAY W/OPTIC: CPT

## 2025-05-13 PROCEDURE — 2500000003 HC RX 250 WO HCPCS: Performed by: EMERGENCY MEDICINE

## 2025-05-13 PROCEDURE — 96374 THER/PROPH/DIAG INJ IV PUSH: CPT

## 2025-05-13 PROCEDURE — 99285 EMERGENCY DEPT VISIT HI MDM: CPT

## 2025-05-13 PROCEDURE — 6360000004 HC RX CONTRAST MEDICATION: Performed by: EMERGENCY MEDICINE

## 2025-05-13 PROCEDURE — 6370000000 HC RX 637 (ALT 250 FOR IP): Performed by: EMERGENCY MEDICINE

## 2025-05-13 PROCEDURE — 85379 FIBRIN DEGRADATION QUANT: CPT

## 2025-05-13 PROCEDURE — 80053 COMPREHEN METABOLIC PANEL: CPT

## 2025-05-13 PROCEDURE — 6360000002 HC RX W HCPCS: Performed by: EMERGENCY MEDICINE

## 2025-05-13 PROCEDURE — 36415 COLL VENOUS BLD VENIPUNCTURE: CPT

## 2025-05-13 PROCEDURE — 71260 CT THORAX DX C+: CPT

## 2025-05-13 PROCEDURE — 85025 COMPLETE CBC W/AUTO DIFF WBC: CPT

## 2025-05-13 RX ORDER — SODIUM CHLORIDE 0.9 % (FLUSH) 0.9 %
10 SYRINGE (ML) INJECTION PRN
Status: DISCONTINUED | OUTPATIENT
Start: 2025-05-13 | End: 2025-05-13 | Stop reason: HOSPADM

## 2025-05-13 RX ORDER — 0.9 % SODIUM CHLORIDE 0.9 %
70 INTRAVENOUS SOLUTION INTRAVENOUS ONCE
Status: DISCONTINUED | OUTPATIENT
Start: 2025-05-13 | End: 2025-05-13 | Stop reason: HOSPADM

## 2025-05-13 RX ORDER — IOPAMIDOL 755 MG/ML
80 INJECTION, SOLUTION INTRAVASCULAR
Status: COMPLETED | OUTPATIENT
Start: 2025-05-13 | End: 2025-05-13

## 2025-05-13 RX ORDER — FENTANYL CITRATE 0.05 MG/ML
50 INJECTION, SOLUTION INTRAMUSCULAR; INTRAVENOUS ONCE
Status: COMPLETED | OUTPATIENT
Start: 2025-05-13 | End: 2025-05-13

## 2025-05-13 RX ORDER — POTASSIUM CHLORIDE 1500 MG/1
40 TABLET, EXTENDED RELEASE ORAL ONCE
Status: COMPLETED | OUTPATIENT
Start: 2025-05-13 | End: 2025-05-13

## 2025-05-13 RX ORDER — KETOROLAC TROMETHAMINE 30 MG/ML
30 INJECTION, SOLUTION INTRAMUSCULAR; INTRAVENOUS ONCE
Status: COMPLETED | OUTPATIENT
Start: 2025-05-13 | End: 2025-05-13

## 2025-05-13 RX ORDER — IPRATROPIUM BROMIDE AND ALBUTEROL SULFATE 2.5; .5 MG/3ML; MG/3ML
1 SOLUTION RESPIRATORY (INHALATION)
Status: DISCONTINUED | OUTPATIENT
Start: 2025-05-13 | End: 2025-05-13 | Stop reason: HOSPADM

## 2025-05-13 RX ORDER — ONDANSETRON 2 MG/ML
4 INJECTION INTRAMUSCULAR; INTRAVENOUS ONCE
Status: COMPLETED | OUTPATIENT
Start: 2025-05-13 | End: 2025-05-13

## 2025-05-13 RX ORDER — IOPAMIDOL 755 MG/ML
25 INJECTION, SOLUTION INTRAVASCULAR
Status: COMPLETED | OUTPATIENT
Start: 2025-05-13 | End: 2025-05-13

## 2025-05-13 RX ORDER — DEXAMETHASONE SODIUM PHOSPHATE 10 MG/ML
10 INJECTION, SOLUTION INTRAMUSCULAR; INTRAVENOUS ONCE
Status: COMPLETED | OUTPATIENT
Start: 2025-05-13 | End: 2025-05-13

## 2025-05-13 RX ADMIN — ONDANSETRON 4 MG: 2 INJECTION, SOLUTION INTRAMUSCULAR; INTRAVENOUS at 12:50

## 2025-05-13 RX ADMIN — FENTANYL CITRATE 50 MCG: 50 INJECTION INTRAMUSCULAR; INTRAVENOUS at 12:50

## 2025-05-13 RX ADMIN — Medication 70 ML: at 11:12

## 2025-05-13 RX ADMIN — SODIUM CHLORIDE, PRESERVATIVE FREE 10 ML: 5 INJECTION INTRAVENOUS at 11:12

## 2025-05-13 RX ADMIN — POTASSIUM CHLORIDE 40 MEQ: 1500 TABLET, EXTENDED RELEASE ORAL at 12:50

## 2025-05-13 RX ADMIN — KETOROLAC TROMETHAMINE 30 MG: 30 INJECTION, SOLUTION INTRAMUSCULAR at 10:14

## 2025-05-13 RX ADMIN — IPRATROPIUM BROMIDE AND ALBUTEROL SULFATE 1 DOSE: .5; 2.5 SOLUTION RESPIRATORY (INHALATION) at 10:15

## 2025-05-13 RX ADMIN — IOPAMIDOL 25 ML: 755 INJECTION, SOLUTION INTRAVENOUS at 11:12

## 2025-05-13 RX ADMIN — IOPAMIDOL 80 ML: 755 INJECTION, SOLUTION INTRAVENOUS at 11:11

## 2025-05-13 RX ADMIN — DEXAMETHASONE SODIUM PHOSPHATE 10 MG: 10 INJECTION INTRAMUSCULAR; INTRAVENOUS at 12:50

## 2025-05-13 ASSESSMENT — ENCOUNTER SYMPTOMS
BACK PAIN: 0
WHEEZING: 1
NAUSEA: 0
VOMITING: 0
CONSTIPATION: 0
SHORTNESS OF BREATH: 1
COUGH: 1
DIARRHEA: 0
SORE THROAT: 1
TROUBLE SWALLOWING: 1
BLOOD IN STOOL: 0
ABDOMINAL PAIN: 0

## 2025-05-13 ASSESSMENT — PAIN - FUNCTIONAL ASSESSMENT: PAIN_FUNCTIONAL_ASSESSMENT: NONE - DENIES PAIN

## 2025-06-07 ENCOUNTER — APPOINTMENT (OUTPATIENT)
Dept: GENERAL RADIOLOGY | Facility: CLINIC | Age: 35
End: 2025-06-07
Payer: MEDICAID

## 2025-06-07 ENCOUNTER — HOSPITAL ENCOUNTER (EMERGENCY)
Facility: CLINIC | Age: 35
Discharge: HOME | End: 2025-06-07
Attending: STUDENT IN AN ORGANIZED HEALTH CARE EDUCATION/TRAINING PROGRAM
Payer: MEDICAID

## 2025-06-07 VITALS
DIASTOLIC BLOOD PRESSURE: 92 MMHG | HEART RATE: 100 BPM | SYSTOLIC BLOOD PRESSURE: 146 MMHG | TEMPERATURE: 98.3 F | OXYGEN SATURATION: 97 % | BODY MASS INDEX: 38.36 KG/M2 | WEIGHT: 315 LBS | RESPIRATION RATE: 16 BRPM | HEIGHT: 76 IN

## 2025-06-07 DIAGNOSIS — M79.89 LEG SWELLING: Primary | ICD-10-CM

## 2025-06-07 LAB
ALBUMIN SERPL-MCNC: 4.1 G/DL (ref 3.5–5.2)
ALBUMIN/GLOB SERPL: 1.6 {RATIO}
ALP SERPL-CCNC: 98 U/L (ref 40–129)
ALT SERPL-CCNC: 38 U/L (ref 10–50)
ANION GAP SERPL CALCULATED.3IONS-SCNC: 12 MMOL/L (ref 9–16)
AST SERPL-CCNC: 36 U/L (ref 10–50)
BASOPHILS # BLD: 0.1 K/UL (ref 0–0.2)
BASOPHILS NFR BLD: 1 % (ref 0–2)
BILIRUB SERPL-MCNC: 0.4 MG/DL (ref 0–1.2)
BNP SERPL-MCNC: <36 PG/ML (ref 0–300)
BUN SERPL-MCNC: 8 MG/DL (ref 6–20)
CALCIUM SERPL-MCNC: 8.8 MG/DL (ref 8.6–10.4)
CHLORIDE SERPL-SCNC: 106 MMOL/L (ref 98–107)
CO2 SERPL-SCNC: 24 MMOL/L (ref 20–31)
CREAT SERPL-MCNC: 1 MG/DL (ref 0.7–1.2)
EOSINOPHIL # BLD: 0.3 K/UL (ref 0–0.4)
EOSINOPHILS RELATIVE PERCENT: 3 % (ref 1–4)
ERYTHROCYTE [DISTWIDTH] IN BLOOD BY AUTOMATED COUNT: 14.6 % (ref 12.5–15.4)
GFR, ESTIMATED: >90 ML/MIN/1.73M2
GLUCOSE SERPL-MCNC: 102 MG/DL (ref 74–99)
HCT VFR BLD AUTO: 38.9 % (ref 41–53)
HGB BLD-MCNC: 13.8 G/DL (ref 13.5–17.5)
LYMPHOCYTES NFR BLD: 1.9 K/UL (ref 1–4.8)
LYMPHOCYTES RELATIVE PERCENT: 18 % (ref 24–44)
MAGNESIUM SERPL-MCNC: 2.1 MG/DL (ref 1.6–2.6)
MCH RBC QN AUTO: 30.2 PG (ref 26–34)
MCHC RBC AUTO-ENTMCNC: 35.5 G/DL (ref 31–37)
MCV RBC AUTO: 85 FL (ref 80–100)
MONOCYTES NFR BLD: 0.8 K/UL (ref 0.1–1.2)
MONOCYTES NFR BLD: 8 % (ref 2–11)
NEUTROPHILS NFR BLD: 70 % (ref 36–66)
NEUTS SEG NFR BLD: 7.4 K/UL (ref 1.8–7.7)
PLATELET # BLD AUTO: 159 K/UL (ref 140–450)
PMV BLD AUTO: 8.5 FL (ref 6–12)
POTASSIUM SERPL-SCNC: 3.8 MMOL/L (ref 3.7–5.3)
PROT SERPL-MCNC: 6.6 G/DL (ref 6.6–8.7)
RBC # BLD AUTO: 4.57 M/UL (ref 4.5–5.9)
SODIUM SERPL-SCNC: 142 MMOL/L (ref 136–145)
TROPONIN I SERPL HS-MCNC: 10 NG/L (ref 0–22)
WBC OTHER # BLD: 10.5 K/UL (ref 3.5–11)

## 2025-06-07 PROCEDURE — 85025 COMPLETE CBC W/AUTO DIFF WBC: CPT

## 2025-06-07 PROCEDURE — 99285 EMERGENCY DEPT VISIT HI MDM: CPT

## 2025-06-07 PROCEDURE — 36415 COLL VENOUS BLD VENIPUNCTURE: CPT

## 2025-06-07 PROCEDURE — 83880 ASSAY OF NATRIURETIC PEPTIDE: CPT

## 2025-06-07 PROCEDURE — 71045 X-RAY EXAM CHEST 1 VIEW: CPT

## 2025-06-07 PROCEDURE — 93005 ELECTROCARDIOGRAM TRACING: CPT

## 2025-06-07 PROCEDURE — 80053 COMPREHEN METABOLIC PANEL: CPT

## 2025-06-07 PROCEDURE — 83735 ASSAY OF MAGNESIUM: CPT

## 2025-06-07 PROCEDURE — 84484 ASSAY OF TROPONIN QUANT: CPT

## 2025-06-07 ASSESSMENT — PAIN SCALES - GENERAL: PAINLEVEL_OUTOF10: 5

## 2025-06-07 NOTE — ED PROVIDER NOTES
[facility]  [address] [facility] [address]  Phone: [phone]      Attending Physician Attestation    I performed a history and physical examination of the patient and discussed management with the mid level provider. I reviewed the mid level provider's note and agree with the documented findings and plan of care. Any areas of disagreement are noted on the chart. I was personally present for the key portions of any procedures. I have documented in the chart those procedures where I was not present during the key portions. I have reviewed the emergency nurses triage note. I agree with the chief complaint, past medical history, past surgical history, allergies, medications, social and family history as documented unless otherwise noted below. Documentation of the HPI, Physical Exam and Medical Decision Making performed by mid level providers is based on my personal performance of the HPI, PE and MDM. For Physician Assistant/ Nurse Practitioner cases/documentation I have personally evaluated this patient and have completed at least one if not all key elements of the E/M (history, physical exam, and MDM). Additional findings are as noted.      Chief Complaint   Patient presents with    Foot Swelling       INITIAL VITALS:  height is 1.93 m (6' 4\") and weight is 175.1 kg (386 lb) (abnormal). His pulse is 100. His oxygen saturation is 100%.       DIAGNOSTIC RESULTS       RADIOLOGY:   Non-plain film images such as CT, Ultrasound and MRI are read by the radiologist. Plain radiographic images are visualized and the radiologist interpretations are reviewed as follows:     XR CHEST PORTABLE    (Results Pending)         LABS:  No results found for this visit on 06/07/25.    EKG:  EKG showed normal sinus rhythm, rate 87, normal axis, no acute ST or T wave changes, nonspecific EKG    EMERGENCY DEPARTMENT COURSE:   Vitals:    Vitals:    06/07/25 1824   Pulse: 100   SpO2: 100%   Weight: (!) 175.1

## 2025-06-07 NOTE — ED PROVIDER NOTES
[facility]  [address] [id number]  Phone: [phone]      Patient Name:  Maximilian Benton  Medical Record Number:  [id number]  YOB: 1990  Date of Service:  6/7/2025  Primary Care Physician:  Shaniqua Estrada APRN - CNP      CHIEF COMPLAINT:       Chief Complaint   Patient presents with    Foot Swelling       HISTORY OF PRESENT ILLNESS:    Maximilian Benton is a 35 y.o. male who presents with the complaint of bilateral feet swelling for the past few days.  He presents now reporting that the swelling in his feet is better than it was earlier today.  He has been keeping them elevated.  No history of heart failure no chest pain or shortness of breath no vomiting no constipation no diarrhea no lower urinary tract symptoms.  He reports that he chronically has nausea because of his gastric bypass surgery.  Does treat for history of hypertension approximately 2 weeks ago was started on losartan hydrochlorothiazide.    CURRENT MEDICATIONS:      Previous Medications    ACETAMINOPHEN (TYLENOL) 500 MG TABLET    Take 2 tablets by mouth every 6 hours as needed for Pain    ACETAMINOPHEN (TYLENOL) 500 MG TABLET    Take 2 tablets by mouth 3 times daily as needed for Pain    ALPRAZOLAM (XANAX) 0.5 MG TABLET    Take 2 tablets by mouth nightly as needed for Sleep.    AMLODIPINE (NORVASC) 5 MG TABLET      Take 10 mg by mouth daily     CARIPRAZINE HCL (VRAYLAR) 4.5 MG CAPS CAPSULE    Take 1 capsule by mouth daily    CLONAZEPAM (KLONOPIN PO)    Take 0.75 mg by mouth    DICLOFENAC SODIUM (VOLTAREN) 1 % GEL    Apply 2 g topically 4 times daily    DICYCLOMINE (BENTYL) 10 MG CAPSULE    Take 1 capsule by mouth 3 times daily as needed (cramping)    EMTRICITABINE-TENOFOVIR ALAFENAMIDE (DESCOVY) 200-25 MG TABS TABLET    Take 1 tablet by mouth daily    FLUOXETINE (PROZAC) 40 MG CAPSULE    Total of 60 mg daily    FLUTICASONE (FLOVENT HFA) 44 MCG/ACT INHALER    Inhale 1 puff into the lungs 2        ALLERGIES:   is allergic to augmentin [amoxicillin-pot clavulanate], chantix [varenicline tartrate], ciprofloxacin, nsaids, pcn [penicillins], tessalon [benzonatate], and tolmetin.    PAST MEDICAL HISTORY:    has a past medical history of Anxiety, Asthma, Bipolar disorder (HCC), Depression, GERD (gastroesophageal reflux disease), Headache, Hypertension, Hypothyroidism, Insomnia, and Unspecified sleep apnea.    SURGICAL HISTORY:      has a past surgical history that includes [place] tooth extraction; Upper gastrointestinal endoscopy; Finger trigger release; Tonsillectomy; and Gastric bypass surgery.    FAMILY HISTORY:   is adopted.      family history is not on file. He was adopted.    SOCIAL HISTORY:     reports that he has been smoking e-cigarettes. He uses smokeless tobacco. He reports current alcohol use. He reports that he does not use drugs.    IMMUNIZATION HISTORY:      Immunization History   Administered Date(s) Administered    COVID-19, MODERNA BLUE border, Primary or Immunocompromised, (age 12y+), IM, 100 mcg/0.5mL 04/15/2021, 05/11/2021    COVID-19, MODERNA Bivalent, (age 12y+), IM, 50 mcg/0.5 mL 11/23/2022    COVID-19, MODERNA Booster BLUE border, (age 18y+), IM, 50mcg/0.25mL 10/26/2021    COVID-19, MODERNA, 2024/25, (age 12y+), IM, 50mcg/0.5mL 10/31/2023, 09/08/2024    TDaP, ADACEL (age 10y-64y), BOOSTRIX (age 10y+), IM, 0.5mL 06/14/2012       PHYSICAL EXAM:     Initial Vital Signs:  height is 1.93 m (6' 4\") and weight is 175.1 kg (386 lb) (abnormal). His oral temperature is 98.3 °F (36.8 °C). His blood pressure is 146/92 (abnormal) and his pulse is 100. His respiration is 16 and oxygen saturation is 97%.   Physical Exam  Vitals and nursing note reviewed.   Constitutional:       General: He is not in acute distress.     Appearance: Normal appearance. He is not ill-appearing, toxic-appearing or diaphoretic.   HENT:      Head: Normocephalic and atraumatic.      Right Ear: Tympanic membrane normal.

## 2025-06-07 NOTE — DISCHARGE INSTRUCTIONS
Please call and schedule a follow up appointment with your PCP in the next 2-3 days. Take your prescriptions as directed.     Please make sure to continue taking all of your current medications as currently prescribed.  Make sure to keep your legs elevated when not ambulating.  Make sure you are taking your blood pressure medication that has a water pill in it.  You may also try utilizing some Ace bandages and wrapping from your feet up to your knees this can help improve circulation and decrease fluid dependency.  Make sure to follow-up closely with your primary care physician.  Return to the ER for any worsening symptoms or concerns.    PLEASE RETURN TO THE EMERGENCY DEPARTMENT IMMEDIATELY for worsening symptoms, or if you develop any concerning symptoms such as: high fever not relieved by acetaminophen (Tylenol) and/or ibuprofen (Motrin), chills, shortness of breath, chest pain, persistent nausea and/or vomiting, numbness, weakness or tingling in the arms or legs or change in color of the extremities, changes in mental status, persistent headache, blurry vision.

## 2025-06-08 LAB
EKG ATRIAL RATE: 87 BPM
EKG P AXIS: 67 DEGREES
EKG P-R INTERVAL: 152 MS
EKG Q-T INTERVAL: 346 MS
EKG QRS DURATION: 88 MS
EKG QTC CALCULATION (BAZETT): 416 MS
EKG R AXIS: 35 DEGREES
EKG T AXIS: 35 DEGREES
EKG VENTRICULAR RATE: 87 BPM

## 2025-06-30 ENCOUNTER — APPOINTMENT (OUTPATIENT)
Dept: GENERAL RADIOLOGY | Age: 35
End: 2025-06-30
Payer: MEDICAID

## 2025-06-30 ENCOUNTER — APPOINTMENT (OUTPATIENT)
Dept: CT IMAGING | Age: 35
End: 2025-06-30
Payer: MEDICAID

## 2025-06-30 ENCOUNTER — HOSPITAL ENCOUNTER (EMERGENCY)
Age: 35
Discharge: HOME | End: 2025-06-30
Attending: STUDENT IN AN ORGANIZED HEALTH CARE EDUCATION/TRAINING PROGRAM
Payer: MEDICAID

## 2025-06-30 VITALS
DIASTOLIC BLOOD PRESSURE: 72 MMHG | TEMPERATURE: 97.7 F | WEIGHT: 315 LBS | HEART RATE: 75 BPM | HEIGHT: 76 IN | OXYGEN SATURATION: 98 % | SYSTOLIC BLOOD PRESSURE: 117 MMHG | BODY MASS INDEX: 38.36 KG/M2 | RESPIRATION RATE: 16 BRPM

## 2025-06-30 DIAGNOSIS — R55 SYNCOPE AND COLLAPSE: Primary | ICD-10-CM

## 2025-06-30 LAB
ALBUMIN SERPL-MCNC: 4.2 G/DL (ref 3.5–5.2)
ALBUMIN/GLOB SERPL: 1.8 {RATIO} (ref 1–2.5)
ALP SERPL-CCNC: 92 U/L (ref 40–129)
ALT SERPL-CCNC: 37 U/L (ref 10–50)
ANION GAP SERPL CALCULATED.3IONS-SCNC: 12 MMOL/L (ref 9–16)
AST SERPL-CCNC: 28 U/L (ref 10–50)
BASOPHILS # BLD: 0.1 K/UL (ref 0–0.2)
BASOPHILS NFR BLD: 1 % (ref 0–2)
BILIRUB SERPL-MCNC: 0.3 MG/DL (ref 0–1.2)
BUN SERPL-MCNC: 14 MG/DL (ref 6–20)
CALCIUM SERPL-MCNC: 8.7 MG/DL (ref 8.6–10.4)
CHLORIDE SERPL-SCNC: 105 MMOL/L (ref 98–107)
CO2 SERPL-SCNC: 21 MMOL/L (ref 20–31)
CREAT SERPL-MCNC: 1.3 MG/DL (ref 0.7–1.2)
EOSINOPHIL # BLD: 0.3 K/UL (ref 0–0.4)
EOSINOPHILS RELATIVE PERCENT: 3 % (ref 1–4)
ERYTHROCYTE [DISTWIDTH] IN BLOOD BY AUTOMATED COUNT: 14.2 % (ref 12.5–15.4)
GFR, ESTIMATED: 74 ML/MIN/1.73M2
GLUCOSE SERPL-MCNC: 178 MG/DL (ref 74–99)
HCT VFR BLD AUTO: 40.1 % (ref 41–53)
HGB BLD-MCNC: 13.9 G/DL (ref 13.5–17.5)
LIPASE SERPL-CCNC: 74 U/L (ref 13–60)
LYMPHOCYTES NFR BLD: 2.4 K/UL (ref 1–4.8)
LYMPHOCYTES RELATIVE PERCENT: 30 % (ref 24–44)
MCH RBC QN AUTO: 29.7 PG (ref 26–34)
MCHC RBC AUTO-ENTMCNC: 34.7 G/DL (ref 31–37)
MCV RBC AUTO: 85.7 FL (ref 80–100)
MONOCYTES NFR BLD: 0.7 K/UL (ref 0.1–1.2)
MONOCYTES NFR BLD: 8 % (ref 2–11)
NEUTROPHILS NFR BLD: 58 % (ref 36–66)
NEUTS SEG NFR BLD: 4.7 K/UL (ref 1.8–7.7)
PLATELET # BLD AUTO: 217 K/UL (ref 140–450)
PMV BLD AUTO: 9.9 FL (ref 6–12)
POTASSIUM SERPL-SCNC: 3.7 MMOL/L (ref 3.7–5.3)
PROT SERPL-MCNC: 6.6 G/DL (ref 6.6–8.7)
RBC # BLD AUTO: 4.68 M/UL (ref 4.5–5.9)
SODIUM SERPL-SCNC: 138 MMOL/L (ref 136–145)
TROPONIN I SERPL HS-MCNC: 6 NG/L (ref 0–22)
TROPONIN I SERPL HS-MCNC: 7 NG/L (ref 0–22)
WBC OTHER # BLD: 8 K/UL (ref 3.5–11)

## 2025-06-30 PROCEDURE — 2500000003 HC RX 250 WO HCPCS: Performed by: STUDENT IN AN ORGANIZED HEALTH CARE EDUCATION/TRAINING PROGRAM

## 2025-06-30 PROCEDURE — 85025 COMPLETE CBC W/AUTO DIFF WBC: CPT

## 2025-06-30 PROCEDURE — 71045 X-RAY EXAM CHEST 1 VIEW: CPT

## 2025-06-30 PROCEDURE — 96374 THER/PROPH/DIAG INJ IV PUSH: CPT

## 2025-06-30 PROCEDURE — 72170 X-RAY EXAM OF PELVIS: CPT

## 2025-06-30 PROCEDURE — 99285 EMERGENCY DEPT VISIT HI MDM: CPT

## 2025-06-30 PROCEDURE — 96375 TX/PRO/DX INJ NEW DRUG ADDON: CPT

## 2025-06-30 PROCEDURE — 6360000002 HC RX W HCPCS: Performed by: STUDENT IN AN ORGANIZED HEALTH CARE EDUCATION/TRAINING PROGRAM

## 2025-06-30 PROCEDURE — 2580000003 HC RX 258: Performed by: STUDENT IN AN ORGANIZED HEALTH CARE EDUCATION/TRAINING PROGRAM

## 2025-06-30 PROCEDURE — 36415 COLL VENOUS BLD VENIPUNCTURE: CPT

## 2025-06-30 PROCEDURE — 70486 CT MAXILLOFACIAL W/O DYE: CPT

## 2025-06-30 PROCEDURE — 72125 CT NECK SPINE W/O DYE: CPT

## 2025-06-30 PROCEDURE — 6370000000 HC RX 637 (ALT 250 FOR IP): Performed by: STUDENT IN AN ORGANIZED HEALTH CARE EDUCATION/TRAINING PROGRAM

## 2025-06-30 PROCEDURE — 80053 COMPREHEN METABOLIC PANEL: CPT

## 2025-06-30 PROCEDURE — 70450 CT HEAD/BRAIN W/O DYE: CPT

## 2025-06-30 PROCEDURE — 84484 ASSAY OF TROPONIN QUANT: CPT

## 2025-06-30 PROCEDURE — 83690 ASSAY OF LIPASE: CPT

## 2025-06-30 PROCEDURE — 93005 ELECTROCARDIOGRAM TRACING: CPT | Performed by: STUDENT IN AN ORGANIZED HEALTH CARE EDUCATION/TRAINING PROGRAM

## 2025-06-30 RX ORDER — ORPHENADRINE CITRATE 30 MG/ML
60 INJECTION INTRAMUSCULAR; INTRAVENOUS ONCE
Status: COMPLETED | OUTPATIENT
Start: 2025-06-30 | End: 2025-06-30

## 2025-06-30 RX ORDER — ONDANSETRON 2 MG/ML
4 INJECTION INTRAMUSCULAR; INTRAVENOUS ONCE
Status: COMPLETED | OUTPATIENT
Start: 2025-06-30 | End: 2025-06-30

## 2025-06-30 RX ORDER — FENTANYL CITRATE 50 UG/ML
100 INJECTION, SOLUTION INTRAMUSCULAR; INTRAVENOUS ONCE
Status: COMPLETED | OUTPATIENT
Start: 2025-06-30 | End: 2025-06-30

## 2025-06-30 RX ORDER — HYDROCODONE BITARTRATE AND ACETAMINOPHEN 5; 325 MG/1; MG/1
1 TABLET ORAL ONCE
Status: COMPLETED | OUTPATIENT
Start: 2025-06-30 | End: 2025-06-30

## 2025-06-30 RX ORDER — LIDOCAINE 50 MG/G
1 PATCH TOPICAL DAILY
Qty: 10 PATCH | Refills: 0 | Status: SHIPPED | OUTPATIENT
Start: 2025-06-30 | End: 2025-07-10

## 2025-06-30 RX ORDER — BACITRACIN ZINC AND POLYMYXIN B SULFATE 500; 1000 [USP'U]/G; [USP'U]/G
OINTMENT TOPICAL
Qty: 15 G | Refills: 1 | Status: SHIPPED | OUTPATIENT
Start: 2025-06-30 | End: 2025-07-07

## 2025-06-30 RX ORDER — 0.9 % SODIUM CHLORIDE 0.9 %
1000 INTRAVENOUS SOLUTION INTRAVENOUS ONCE
Status: COMPLETED | OUTPATIENT
Start: 2025-06-30 | End: 2025-06-30

## 2025-06-30 RX ADMIN — SODIUM CHLORIDE 1000 ML: 0.9 INJECTION, SOLUTION INTRAVENOUS at 08:31

## 2025-06-30 RX ADMIN — HYDROCODONE BITARTRATE AND ACETAMINOPHEN 1 TABLET: 5; 325 TABLET ORAL at 10:20

## 2025-06-30 RX ADMIN — FENTANYL CITRATE 100 MCG: 50 INJECTION INTRAMUSCULAR; INTRAVENOUS at 08:32

## 2025-06-30 RX ADMIN — FAMOTIDINE 20 MG: 10 INJECTION, SOLUTION INTRAVENOUS at 08:32

## 2025-06-30 RX ADMIN — ONDANSETRON 4 MG: 2 INJECTION, SOLUTION INTRAMUSCULAR; INTRAVENOUS at 08:31

## 2025-06-30 RX ADMIN — ORPHENADRINE CITRATE 60 MG: 30 INJECTION, SOLUTION INTRAMUSCULAR; INTRAVENOUS at 09:19

## 2025-06-30 ASSESSMENT — PAIN - FUNCTIONAL ASSESSMENT: PAIN_FUNCTIONAL_ASSESSMENT: 0-10

## 2025-06-30 ASSESSMENT — PAIN SCALES - GENERAL: PAINLEVEL_OUTOF10: 8

## 2025-06-30 NOTE — ED PROVIDER NOTES
[facility]  [address]  [facility] [address]  Phone: [phone]  Fax: [phone]      Patient Name:  Maximilian Benton  Medical Record Number:  [id number]  YOB: 1990  Date of Service:  6/30/2025  Primary Care Physician:  Shaniqua Estrada APRN - CNP      CHIEF COMPLAINT:       Chief Complaint   Patient presents with    Loss of Consciousness       HISTORY OF PRESENT ILLNESS:    Maximilian Benton is a 35 y.o. male who presents with the complaint of syncope.  Patient states that he woke up this morning is felt lightheaded, dizzy, stopped in the gym parking lot as he felt like he needed to throw up and syncopized, fell to the ground.  Is not on anticoagulation or antiplatelet.  Was unconscious for a brief period, return to baseline immediately after, no bowel or bladder incontinence.  No seizure-like activity.  He did strike his head, has pain to his nose, lip.  States he has tailbone pain as well, denies any weakness or numbness.  Also reporting neck pain.  Brought in by EMS.  States that tetanus vaccination was updated in the last 2 to 3 years.    CURRENT MEDICATIONS:      Discharge Medication List as of 6/30/2025 10:26 AM        CONTINUE these medications which have NOT CHANGED    Details   fluticasone (FLOVENT HFA) 44 MCG/ACT inhaler Inhale 1 puff into the lungs 2 times dailyHistorical Med      ipratropium 0.5 mg-albuterol 2.5 mg (DUONEB) 0.5-2.5 (3) MG/3ML SOLN nebulizer solution Inhale 3 mLs into the lungs every 6 hours as needed for Shortness of Breath (wheezing and shortness of breath), Disp-360 mL, R-0Normal      tiZANidine (ZANAFLEX) 4 MG tablet Take 1 tablet by mouth every 6 hours as neededHistorical Med      ondansetron (ZOFRAN) 4 MG tablet Take 1 tablet by mouth every 8 hours as needed for Nausea, Disp-20 tablet, R-0Normal      dicyclomine (BENTYL) 10 MG capsule Take 1 capsule by mouth 3 times daily as needed (cramping), Disp-20 capsule, R-0Normal

## 2025-06-30 NOTE — DISCHARGE INSTRUCTIONS
Call today or tomorrow to follow up with Shaniqua Estrada APRN - CNP  in 2-3 days.    Get up slowly; dangle your feet over the bed before standing up, do not stand up quickly.    For pain use acetaminophen (Tylenol) unless prescribed medications that have acetaminophen You can take over the counter acetaminophen tablets (1 - 2 tablets of the 500-mg strength every 6 hours)     Soak in a hot shower or bath tub.  You will have more aches and pains tomorrow, but should feel better in several days.    PLEASE RETURN TO THE EMERGENCY DEPARTMENT IMMEDIATELY for worsening of pain, decrease sensation to arms or legs, inability to move arms or legs, shortness of breath, severe chest pain, excessive nausea or vomiting, notice any bruising to your abdomen or have increase in abdominal pain, or if you develop any concerning symptoms such as: high fever not relieved by acetaminophen (Tylenol) and/or ibuprofen (Motrin / Advil), chills, feeling of your heart fluttering or racing, persistent nausea and/or vomiting, vomiting up blood, blood in your stool, loss of consciousness, numbness, weakness or tingling in the arms or legs or change in color of the extremities, changes in mental status, persistent headache, blurry vision, loss of bladder / bowel control, unable to follow up with your physician, or other any other care or concern.

## 2025-07-01 LAB
EKG ATRIAL RATE: 77 BPM
EKG P AXIS: 43 DEGREES
EKG P-R INTERVAL: 164 MS
EKG Q-T INTERVAL: 368 MS
EKG QRS DURATION: 102 MS
EKG QTC CALCULATION (BAZETT): 416 MS
EKG R AXIS: 57 DEGREES
EKG T AXIS: 47 DEGREES
EKG VENTRICULAR RATE: 77 BPM

## 2025-07-01 PROCEDURE — 93010 ELECTROCARDIOGRAM REPORT: CPT | Performed by: INTERNAL MEDICINE

## 2025-07-02 ENCOUNTER — HOSPITAL ENCOUNTER (EMERGENCY)
Facility: CLINIC | Age: 35
Discharge: HOME | End: 2025-07-02
Attending: SPECIALIST
Payer: MEDICAID

## 2025-07-02 ENCOUNTER — APPOINTMENT (OUTPATIENT)
Dept: GENERAL RADIOLOGY | Facility: CLINIC | Age: 35
End: 2025-07-02
Attending: SPECIALIST
Payer: MEDICAID

## 2025-07-02 VITALS
SYSTOLIC BLOOD PRESSURE: 127 MMHG | RESPIRATION RATE: 16 BRPM | HEART RATE: 99 BPM | DIASTOLIC BLOOD PRESSURE: 60 MMHG | TEMPERATURE: 97.8 F | OXYGEN SATURATION: 97 %

## 2025-07-02 DIAGNOSIS — E87.6 HYPOKALEMIA: ICD-10-CM

## 2025-07-02 DIAGNOSIS — R07.9 CHEST PAIN, UNSPECIFIED TYPE: Primary | ICD-10-CM

## 2025-07-02 DIAGNOSIS — R10.9 ABDOMINAL PAIN, UNSPECIFIED ABDOMINAL LOCATION: ICD-10-CM

## 2025-07-02 LAB
ALBUMIN SERPL-MCNC: 4.7 G/DL (ref 3.5–5.2)
ALBUMIN/GLOB SERPL: 1.6 {RATIO}
ALP SERPL-CCNC: 113 U/L (ref 40–129)
ALT SERPL-CCNC: 45 U/L (ref 10–50)
ANION GAP SERPL CALCULATED.3IONS-SCNC: 12 MMOL/L (ref 9–16)
AST SERPL-CCNC: 36 U/L (ref 10–50)
BASOPHILS # BLD: 0.1 K/UL (ref 0–0.2)
BASOPHILS NFR BLD: 1 % (ref 0–2)
BILIRUB SERPL-MCNC: 0.6 MG/DL (ref 0–1.2)
BUN SERPL-MCNC: 10 MG/DL (ref 6–20)
CALCIUM SERPL-MCNC: 9.6 MG/DL (ref 8.6–10.4)
CHLORIDE SERPL-SCNC: 104 MMOL/L (ref 98–107)
CO2 SERPL-SCNC: 24 MMOL/L (ref 20–31)
CREAT SERPL-MCNC: 0.9 MG/DL (ref 0.7–1.2)
EKG ATRIAL RATE: 102 BPM
EKG P-R INTERVAL: 136 MS
EKG Q-T INTERVAL: 330 MS
EKG QRS DURATION: 92 MS
EKG QTC CALCULATION (BAZETT): 430 MS
EKG R AXIS: 140 DEGREES
EKG T AXIS: 160 DEGREES
EKG VENTRICULAR RATE: 102 BPM
EOSINOPHIL # BLD: 0.3 K/UL (ref 0–0.4)
EOSINOPHILS RELATIVE PERCENT: 3 % (ref 1–4)
ERYTHROCYTE [DISTWIDTH] IN BLOOD BY AUTOMATED COUNT: 13.9 % (ref 12.5–15.4)
GFR, ESTIMATED: >90 ML/MIN/1.73M2
GLUCOSE SERPL-MCNC: 95 MG/DL (ref 74–99)
HCT VFR BLD AUTO: 43.9 % (ref 41–53)
HGB BLD-MCNC: 15.5 G/DL (ref 13.5–17.5)
LIPASE SERPL-CCNC: 61 U/L (ref 13–60)
LYMPHOCYTES NFR BLD: 3.1 K/UL (ref 1–4.8)
LYMPHOCYTES RELATIVE PERCENT: 33 % (ref 24–44)
MAGNESIUM SERPL-MCNC: 2.1 MG/DL (ref 1.6–2.6)
MCH RBC QN AUTO: 30 PG (ref 26–34)
MCHC RBC AUTO-ENTMCNC: 35.4 G/DL (ref 31–37)
MCV RBC AUTO: 84.7 FL (ref 80–100)
MONOCYTES NFR BLD: 1 K/UL (ref 0.1–1.2)
MONOCYTES NFR BLD: 10 % (ref 2–11)
NEUTROPHILS NFR BLD: 53 % (ref 36–66)
NEUTS SEG NFR BLD: 5.1 K/UL (ref 1.8–7.7)
PLATELET # BLD AUTO: 229 K/UL (ref 140–450)
PMV BLD AUTO: 9.2 FL (ref 6–12)
POTASSIUM SERPL-SCNC: 3.5 MMOL/L (ref 3.7–5.3)
PROT SERPL-MCNC: 7.6 G/DL (ref 6.6–8.7)
RBC # BLD AUTO: 5.19 M/UL (ref 4.5–5.9)
SODIUM SERPL-SCNC: 140 MMOL/L (ref 136–145)
TROPONIN I SERPL HS-MCNC: 6 NG/L (ref 0–22)
WBC OTHER # BLD: 9.5 K/UL (ref 3.5–11)

## 2025-07-02 PROCEDURE — 80053 COMPREHEN METABOLIC PANEL: CPT

## 2025-07-02 PROCEDURE — 2580000003 HC RX 258: Performed by: SPECIALIST

## 2025-07-02 PROCEDURE — 83735 ASSAY OF MAGNESIUM: CPT

## 2025-07-02 PROCEDURE — 36415 COLL VENOUS BLD VENIPUNCTURE: CPT

## 2025-07-02 PROCEDURE — 93005 ELECTROCARDIOGRAM TRACING: CPT | Performed by: SPECIALIST

## 2025-07-02 PROCEDURE — 96374 THER/PROPH/DIAG INJ IV PUSH: CPT

## 2025-07-02 PROCEDURE — 84484 ASSAY OF TROPONIN QUANT: CPT

## 2025-07-02 PROCEDURE — 99285 EMERGENCY DEPT VISIT HI MDM: CPT

## 2025-07-02 PROCEDURE — 6360000002 HC RX W HCPCS: Performed by: SPECIALIST

## 2025-07-02 PROCEDURE — 6370000000 HC RX 637 (ALT 250 FOR IP): Performed by: SPECIALIST

## 2025-07-02 PROCEDURE — 71045 X-RAY EXAM CHEST 1 VIEW: CPT

## 2025-07-02 PROCEDURE — 83690 ASSAY OF LIPASE: CPT

## 2025-07-02 PROCEDURE — 85025 COMPLETE CBC W/AUTO DIFF WBC: CPT

## 2025-07-02 RX ORDER — 0.9 % SODIUM CHLORIDE 0.9 %
1000 INTRAVENOUS SOLUTION INTRAVENOUS ONCE
Status: COMPLETED | OUTPATIENT
Start: 2025-07-02 | End: 2025-07-02

## 2025-07-02 RX ORDER — ONDANSETRON 2 MG/ML
4 INJECTION INTRAMUSCULAR; INTRAVENOUS ONCE
Status: COMPLETED | OUTPATIENT
Start: 2025-07-02 | End: 2025-07-02

## 2025-07-02 RX ORDER — POTASSIUM CHLORIDE 1500 MG/1
20 TABLET, EXTENDED RELEASE ORAL ONCE
Status: COMPLETED | OUTPATIENT
Start: 2025-07-02 | End: 2025-07-02

## 2025-07-02 RX ADMIN — ONDANSETRON 4 MG: 2 INJECTION, SOLUTION INTRAMUSCULAR; INTRAVENOUS at 02:31

## 2025-07-02 RX ADMIN — SODIUM CHLORIDE 1000 ML: 0.9 INJECTION, SOLUTION INTRAVENOUS at 02:51

## 2025-07-02 RX ADMIN — POTASSIUM CHLORIDE 20 MEQ: 1500 TABLET, EXTENDED RELEASE ORAL at 04:14

## 2025-07-02 ASSESSMENT — PAIN - FUNCTIONAL ASSESSMENT
PAIN_FUNCTIONAL_ASSESSMENT: NONE - DENIES PAIN
PAIN_FUNCTIONAL_ASSESSMENT: NONE - DENIES PAIN

## 2025-07-02 NOTE — DISCHARGE INSTRUCTIONS
Please understand that at this time there is no evidence for a more serious underlying process, but that early in the process of an illness or injury, an emergency department workup can be falsely reassuring.  You should contact your family doctor within the next 48 hours for a follow up appointment    THANK YOU!!!    From [facility] and [facility]    On behalf of the Emergency Department staff at [facility], I would like to thank you for giving us the opportunity to address your health care needs and concerns.    We hope that during your visit, our service was delivered in a professional and caring manner. Please keep [facility] in mind as we walk with you down the path to your own personal wellness.     Please expect an automated text message or email from us so we can ask a few questions about your health and progress. Based on your answers, a clinician may call you back to offer help and instructions.    Please understand that early in the process of an illness or injury, an emergency department workup can be falsely reassuring.  If you notice any worsening, changing or persistent symptoms please call your family doctor or return to the ER immediately.     Tell us how we did during your visit at [email or web address]   and let us know about your experience

## 2025-07-02 NOTE — DISCHARGE INSTR - COC
Continuity of Care Form    Patient Name: Maximilian Benton   :  1990  MRN:  [id number]    Admit date:  2025  Discharge date:  ***    Code Status Order: No Order   Advance Directives:     Admitting Physician:  No admitting provider for patient encounter.  PCP: Shaniqua Estrada APRN - CNP    Discharging Nurse: ***  Discharging Hospital Unit/Room#: JYOTSNA/JYOTSNA  Discharging Unit Phone Number: ***    Emergency Contact:   Extended Emergency Contact Information  Primary Emergency Contact: Dina Garcia  Address: [address] [place] [address] [address]  Home Phone: [phone]  Relation: Aunt/Uncle    Past Surgical History:  Past Surgical History:   Procedure Laterality Date    FINGER TRIGGER RELEASE      GASTRIC BYPASS SURGERY      TONSILLECTOMY      UPPER GASTROINTESTINAL ENDOSCOPY      WIS[place] TOOTH EXTRACTION         Immunization History:   Immunization History   Administered Date(s) Administered    COVID-19, MODERNA BLUE border, Primary or Immunocompromised, (age 12y+), IM, 100 mcg/0.5mL 04/15/2021, 2021    COVID-19, MODERNA Bivalent, (age 12y+), IM, 50 mcg/0.5 mL 2022    COVID-19, MODERNA Booster BLUE border, (age 18y+), IM, 50mcg/0.25mL 10/26/2021    COVID-19, MODERNA, , (age 12y+), IM, 50mcg/0.5mL 10/31/2023, 2024    TDaP, ADACEL (age 10y-64y), BOOSTRIX (age 10y+), IM, 0.5mL 2012       Active Problems:  Patient Active Problem List   Diagnosis Code    Hypertension I10    Asthma J45.909    Anxiety F41.9    Insomnia G47.00    Bipolar disorder (HCC) F31.9    Hypothyroidism E03.9       Isolation/Infection:   Isolation            No Isolation          Patient Infection Status        Infection Onset Added Last Indicated Last Indicated By Review Planned Expiration    MRSA  17 Daniela Huber RN      Urine - 2017                           Nurse Assessment:  Last Vital Signs: /60   Pulse 99   Temp 97.8 °F (36.6 °C) (Oral)   Resp

## 2025-07-02 NOTE — ED PROVIDER NOTES
[facility]  [address] [id number]  Phone: [phone]      Patient Name:  Maximilian Benton  Medical Record Number:  [id number]  YOB: 1990  Date of Service:  7/2/2025  Primary Care Physician:  Shaniqua Estrada APRN - CNP      CHIEF COMPLAINT:       Chief Complaint   Patient presents with    Chest Pain    Abdominal Pain    Dizziness       HISTORY OF PRESENT ILLNESS:    Maximilian Benton is a 35 y.o. male who presents to the emergency department via private vehicle with the complaint of substernal chest pain as well as squeezing sensation in the abdomen since 2 days prior to arrival.  Patient was seen in the emergency department at [facility] after he has had syncopal episode 2 days ago.  His cardiac workup as well as CT scan of the brain were unremarkable and patient was discharged home.  He saw his primary care physician in the office yesterday and has echocardiogram advised as an outpatient.  He continued having chest discomfort and thus he comes to the emergency department.  He still has lightheadedness and dizziness as well as nausea but denies any vomiting, constipation or diarrhea.  He admits to having shortness of breath and palpitations.  He denies any swelling in the legs or calf pain.  Patient has history of hypertension, hypothyroidism, bronchial asthma, fibromyalgia but denies any cardiac history.    CURRENT MEDICATIONS:      Discharge Medication List as of 7/2/2025  4:07 AM        CONTINUE these medications which have NOT CHANGED    Details   lidocaine (LIDODERM) 5 % Place 1 patch onto the skin daily for 10 days 12 hours on, 12 hours off., Disp-10 patch, R-0Normal      bacitracin-polymyxin b (POLYSPORIN) 500-25032 UNIT/GM ointment Apply topically 2 times daily., Disp-15 g, R-1, Normal      fluticasone (FLOVENT HFA) 44 MCG/ACT inhaler Inhale 1 puff into the lungs 2 times dailyHistorical Med      ipratropium 0.5 mg-albuterol 2.5 mg

## 2025-08-10 ENCOUNTER — HOSPITAL ENCOUNTER (EMERGENCY)
Facility: CLINIC | Age: 35
Discharge: HOME | End: 2025-08-10
Attending: EMERGENCY MEDICINE
Payer: MEDICAID

## 2025-08-10 VITALS
DIASTOLIC BLOOD PRESSURE: 70 MMHG | RESPIRATION RATE: 18 BRPM | BODY MASS INDEX: 47.72 KG/M2 | TEMPERATURE: 97.8 F | HEART RATE: 72 BPM | OXYGEN SATURATION: 97 % | WEIGHT: 315 LBS | SYSTOLIC BLOOD PRESSURE: 139 MMHG

## 2025-08-10 DIAGNOSIS — R30.0 DYSURIA: Primary | ICD-10-CM

## 2025-08-10 PROCEDURE — 51798 US URINE CAPACITY MEASURE: CPT

## 2025-08-10 PROCEDURE — 96372 THER/PROPH/DIAG INJ SC/IM: CPT

## 2025-08-10 PROCEDURE — 6360000002 HC RX W HCPCS: Performed by: NURSE PRACTITIONER

## 2025-08-10 PROCEDURE — 81003 URINALYSIS AUTO W/O SCOPE: CPT

## 2025-08-10 PROCEDURE — 99284 EMERGENCY DEPT VISIT MOD MDM: CPT

## 2025-08-10 RX ORDER — KETOROLAC TROMETHAMINE 30 MG/ML
30 INJECTION, SOLUTION INTRAMUSCULAR; INTRAVENOUS ONCE
Status: COMPLETED | OUTPATIENT
Start: 2025-08-10 | End: 2025-08-10

## 2025-08-10 RX ADMIN — KETOROLAC TROMETHAMINE 30 MG: 30 INJECTION, SOLUTION INTRAMUSCULAR at 18:13

## 2025-08-10 ASSESSMENT — PAIN DESCRIPTION - DESCRIPTORS: DESCRIPTORS: STABBING

## 2025-08-10 ASSESSMENT — PAIN DESCRIPTION - FREQUENCY: FREQUENCY: INTERMITTENT

## 2025-08-10 ASSESSMENT — PAIN DESCRIPTION - LOCATION
LOCATION: PENIS
LOCATION: FLANK

## 2025-08-10 ASSESSMENT — PAIN - FUNCTIONAL ASSESSMENT
PAIN_FUNCTIONAL_ASSESSMENT: ACTIVITIES ARE NOT PREVENTED
PAIN_FUNCTIONAL_ASSESSMENT: 0-10
PAIN_FUNCTIONAL_ASSESSMENT: 0-10

## 2025-08-10 ASSESSMENT — PAIN DESCRIPTION - ORIENTATION: ORIENTATION: LEFT

## 2025-08-10 ASSESSMENT — PAIN SCALES - GENERAL
PAINLEVEL_OUTOF10: 8
PAINLEVEL_OUTOF10: 7

## 2025-08-10 ASSESSMENT — PAIN DESCRIPTION - PAIN TYPE: TYPE: ACUTE PAIN

## 2025-09-02 ENCOUNTER — HOSPITAL ENCOUNTER (EMERGENCY)
Facility: CLINIC | Age: 35
Discharge: HOME | End: 2025-09-03
Attending: EMERGENCY MEDICINE
Payer: MEDICAID

## 2025-09-02 VITALS
HEART RATE: 84 BPM | WEIGHT: 315 LBS | SYSTOLIC BLOOD PRESSURE: 159 MMHG | HEIGHT: 72 IN | BODY MASS INDEX: 42.66 KG/M2 | DIASTOLIC BLOOD PRESSURE: 94 MMHG | RESPIRATION RATE: 15 BRPM | OXYGEN SATURATION: 98 % | TEMPERATURE: 97.9 F

## 2025-09-02 DIAGNOSIS — K08.89 ODONTALGIA: Primary | ICD-10-CM

## 2025-09-02 PROCEDURE — 6360000002 HC RX W HCPCS: Performed by: EMERGENCY MEDICINE

## 2025-09-02 PROCEDURE — 96372 THER/PROPH/DIAG INJ SC/IM: CPT

## 2025-09-02 PROCEDURE — 6370000000 HC RX 637 (ALT 250 FOR IP): Performed by: EMERGENCY MEDICINE

## 2025-09-02 PROCEDURE — 99284 EMERGENCY DEPT VISIT MOD MDM: CPT

## 2025-09-02 RX ORDER — PENICILLIN V POTASSIUM 250 MG/1
500 TABLET, FILM COATED ORAL ONCE
Status: COMPLETED | OUTPATIENT
Start: 2025-09-02 | End: 2025-09-02

## 2025-09-02 RX ORDER — KETOROLAC TROMETHAMINE 30 MG/ML
30 INJECTION, SOLUTION INTRAMUSCULAR; INTRAVENOUS ONCE
Status: COMPLETED | OUTPATIENT
Start: 2025-09-02 | End: 2025-09-02

## 2025-09-02 RX ORDER — ACETAMINOPHEN 500 MG
1000 TABLET ORAL 3 TIMES DAILY
Qty: 42 TABLET | Refills: 0 | Status: SHIPPED | OUTPATIENT
Start: 2025-09-02 | End: 2025-09-09

## 2025-09-02 RX ORDER — PENICILLIN V POTASSIUM 500 MG/1
500 TABLET, FILM COATED ORAL 4 TIMES DAILY
Qty: 28 TABLET | Refills: 0 | Status: SHIPPED | OUTPATIENT
Start: 2025-09-02 | End: 2025-09-09

## 2025-09-02 RX ORDER — OXYCODONE AND ACETAMINOPHEN 5; 325 MG/1; MG/1
1 TABLET ORAL ONCE
Refills: 0 | Status: COMPLETED | OUTPATIENT
Start: 2025-09-02 | End: 2025-09-02

## 2025-09-02 RX ORDER — CETIRIZINE HYDROCHLORIDE 10 MG/1
10 TABLET ORAL DAILY
COMMUNITY

## 2025-09-02 RX ADMIN — PENICILLIN V POTASSIUM 500 MG: 250 TABLET, FILM COATED ORAL at 22:37

## 2025-09-02 RX ADMIN — OXYCODONE AND ACETAMINOPHEN 1 TABLET: 325; 5 TABLET ORAL at 23:10

## 2025-09-02 RX ADMIN — OXYCODONE AND ACETAMINOPHEN 1 TABLET: 325; 5 TABLET ORAL at 22:38

## 2025-09-02 RX ADMIN — KETOROLAC TROMETHAMINE 30 MG: 30 INJECTION, SOLUTION INTRAMUSCULAR at 22:39

## 2025-09-02 ASSESSMENT — PAIN DESCRIPTION - LOCATION
LOCATION: TEETH
LOCATION: TEETH

## 2025-09-02 ASSESSMENT — PAIN DESCRIPTION - PAIN TYPE
TYPE: ACUTE PAIN
TYPE: ACUTE PAIN

## 2025-09-02 ASSESSMENT — PAIN - FUNCTIONAL ASSESSMENT
PAIN_FUNCTIONAL_ASSESSMENT: 0-10

## 2025-09-02 ASSESSMENT — PAIN SCALES - GENERAL
PAINLEVEL_OUTOF10: 10
PAINLEVEL_OUTOF10: 10
PAINLEVEL_OUTOF10: 8
PAINLEVEL_OUTOF10: 8
PAINLEVEL_OUTOF10: 10

## 2025-09-02 ASSESSMENT — PAIN DESCRIPTION - DESCRIPTORS
DESCRIPTORS: ACHING
DESCRIPTORS: ACHING

## 2025-09-02 ASSESSMENT — PAIN DESCRIPTION - FREQUENCY
FREQUENCY: CONTINUOUS
FREQUENCY: CONTINUOUS

## 2025-09-02 ASSESSMENT — PAIN DESCRIPTION - ORIENTATION
ORIENTATION: LEFT;UPPER
ORIENTATION: LEFT;UPPER

## 2025-09-06 ENCOUNTER — HOSPITAL ENCOUNTER (EMERGENCY)
Facility: CLINIC | Age: 35
Discharge: HOME | End: 2025-09-06
Attending: EMERGENCY MEDICINE
Payer: MEDICAID

## 2025-09-06 VITALS
SYSTOLIC BLOOD PRESSURE: 163 MMHG | OXYGEN SATURATION: 97 % | DIASTOLIC BLOOD PRESSURE: 113 MMHG | HEART RATE: 84 BPM | TEMPERATURE: 98.4 F | RESPIRATION RATE: 17 BRPM

## 2025-09-06 DIAGNOSIS — K04.7 DENTAL INFECTION: Primary | ICD-10-CM

## 2025-09-06 PROCEDURE — 6360000002 HC RX W HCPCS: Performed by: EMERGENCY MEDICINE

## 2025-09-06 PROCEDURE — 6370000000 HC RX 637 (ALT 250 FOR IP): Performed by: EMERGENCY MEDICINE

## 2025-09-06 RX ORDER — CLINDAMYCIN HYDROCHLORIDE 300 MG/1
300 CAPSULE ORAL 3 TIMES DAILY
Qty: 21 CAPSULE | Refills: 0 | Status: SHIPPED | OUTPATIENT
Start: 2025-09-06 | End: 2025-09-13

## 2025-09-06 RX ORDER — KETOROLAC TROMETHAMINE 30 MG/ML
30 INJECTION, SOLUTION INTRAMUSCULAR; INTRAVENOUS ONCE
Status: COMPLETED | OUTPATIENT
Start: 2025-09-06 | End: 2025-09-06

## 2025-09-06 RX ORDER — CLINDAMYCIN HYDROCHLORIDE 150 MG/1
300 CAPSULE ORAL ONCE
Status: COMPLETED | OUTPATIENT
Start: 2025-09-06 | End: 2025-09-06

## 2025-09-06 RX ADMIN — KETOROLAC TROMETHAMINE 30 MG: 30 INJECTION, SOLUTION INTRAMUSCULAR at 14:32

## 2025-09-06 RX ADMIN — CLINDAMYCIN HYDROCHLORIDE 300 MG: 150 CAPSULE ORAL at 14:37

## 2025-09-06 ASSESSMENT — PAIN SCALES - GENERAL
PAINLEVEL_OUTOF10: 10
PAINLEVEL_OUTOF10: 8
PAINLEVEL_OUTOF10: 8

## 2025-09-06 ASSESSMENT — PAIN - FUNCTIONAL ASSESSMENT: PAIN_FUNCTIONAL_ASSESSMENT: 0-10
